# Patient Record
Sex: MALE | Race: WHITE | NOT HISPANIC OR LATINO | Employment: FULL TIME | URBAN - NONMETROPOLITAN AREA
[De-identification: names, ages, dates, MRNs, and addresses within clinical notes are randomized per-mention and may not be internally consistent; named-entity substitution may affect disease eponyms.]

---

## 2019-03-25 ENCOUNTER — APPOINTMENT (OUTPATIENT)
Dept: GENERAL RADIOLOGY | Facility: HOSPITAL | Age: 49
End: 2019-03-25

## 2019-03-25 ENCOUNTER — HOSPITAL ENCOUNTER (EMERGENCY)
Facility: HOSPITAL | Age: 49
Discharge: HOME | End: 2019-03-25
Admitting: NURSE PRACTITIONER

## 2019-03-25 VITALS
HEART RATE: 106 BPM | DIASTOLIC BLOOD PRESSURE: 88 MMHG | RESPIRATION RATE: 18 BRPM | WEIGHT: 310 LBS | TEMPERATURE: 98.3 F | SYSTOLIC BLOOD PRESSURE: 140 MMHG | BODY MASS INDEX: 43.4 KG/M2 | HEIGHT: 71 IN | OXYGEN SATURATION: 94 %

## 2019-03-25 DIAGNOSIS — S93.401A SPRAIN OF RIGHT ANKLE, UNSPECIFIED LIGAMENT, INITIAL ENCOUNTER: Primary | ICD-10-CM

## 2019-03-25 PROCEDURE — 73630 X-RAY EXAM OF FOOT: CPT

## 2019-03-25 PROCEDURE — 99283 EMERGENCY DEPT VISIT LOW MDM: CPT

## 2019-03-25 PROCEDURE — 73610 X-RAY EXAM OF ANKLE: CPT

## 2020-11-30 ENCOUNTER — OFFICE VISIT (OUTPATIENT)
Dept: CARDIOLOGY | Facility: CLINIC | Age: 50
End: 2020-11-30

## 2020-11-30 VITALS
OXYGEN SATURATION: 97 % | SYSTOLIC BLOOD PRESSURE: 172 MMHG | HEART RATE: 91 BPM | BODY MASS INDEX: 44.1 KG/M2 | HEIGHT: 71 IN | DIASTOLIC BLOOD PRESSURE: 94 MMHG | WEIGHT: 315 LBS

## 2020-11-30 DIAGNOSIS — E66.01 MORBID OBESITY WITH BMI OF 50.0-59.9, ADULT (HCC): ICD-10-CM

## 2020-11-30 DIAGNOSIS — R00.2 PALPITATIONS: ICD-10-CM

## 2020-11-30 DIAGNOSIS — E11.9 TYPE 2 DIABETES MELLITUS WITHOUT COMPLICATION, WITHOUT LONG-TERM CURRENT USE OF INSULIN (HCC): ICD-10-CM

## 2020-11-30 DIAGNOSIS — I20.0 UNSTABLE ANGINA (HCC): ICD-10-CM

## 2020-11-30 DIAGNOSIS — R01.1 HEART MURMUR: ICD-10-CM

## 2020-11-30 DIAGNOSIS — I10 ESSENTIAL HYPERTENSION: Primary | ICD-10-CM

## 2020-11-30 DIAGNOSIS — E78.49 OTHER HYPERLIPIDEMIA: ICD-10-CM

## 2020-11-30 PROCEDURE — 93000 ELECTROCARDIOGRAM COMPLETE: CPT | Performed by: EMERGENCY MEDICINE

## 2020-11-30 PROCEDURE — 99204 OFFICE O/P NEW MOD 45 MIN: CPT | Performed by: EMERGENCY MEDICINE

## 2020-11-30 RX ORDER — HYDROCHLOROTHIAZIDE 12.5 MG/1
12.5 CAPSULE, GELATIN COATED ORAL DAILY
COMMUNITY
End: 2020-11-30 | Stop reason: DRUGHIGH

## 2020-11-30 RX ORDER — METOPROLOL SUCCINATE 25 MG/1
25 TABLET, EXTENDED RELEASE ORAL DAILY
COMMUNITY
End: 2022-10-17

## 2020-11-30 RX ORDER — LOSARTAN POTASSIUM 100 MG/1
100 TABLET ORAL DAILY
COMMUNITY
End: 2023-02-07 | Stop reason: SDUPTHER

## 2020-11-30 RX ORDER — AMLODIPINE BESYLATE 10 MG/1
10 TABLET ORAL DAILY
COMMUNITY
End: 2023-02-07 | Stop reason: SDUPTHER

## 2020-11-30 RX ORDER — HYDROCHLOROTHIAZIDE 25 MG/1
25 TABLET ORAL DAILY
Qty: 30 TABLET | Refills: 11 | Status: SHIPPED | OUTPATIENT
Start: 2020-11-30 | End: 2022-05-09 | Stop reason: SDUPTHER

## 2020-11-30 RX ORDER — ATORVASTATIN CALCIUM 40 MG/1
40 TABLET, FILM COATED ORAL DAILY
COMMUNITY
End: 2023-02-07 | Stop reason: SDUPTHER

## 2020-11-30 RX ORDER — OMEPRAZOLE 20 MG/1
20 CAPSULE, DELAYED RELEASE ORAL DAILY
COMMUNITY

## 2020-11-30 RX ORDER — ASPIRIN 81 MG/1
81 TABLET, CHEWABLE ORAL DAILY
Status: ON HOLD | COMMUNITY
End: 2021-05-24 | Stop reason: SDUPTHER

## 2020-11-30 NOTE — PROGRESS NOTES
"    P - CARDIOLOGY  New Patient Initial Outpatient [facility]    Primary Care Physician: Mirella Ortiz APRN    Subjective     Chief Complaint   Patient presents with   • Hypertension     NEW PT   • Shortness of Breath   • Chest Pain   • Dizziness   • Palpitations   • Edema     LEGS        History of Present Illness   \"My blood pressure is too high\"    Patient is a 50-year-old white male with a past medical history that includes poorly controlled hypertension, morbid obesity with a BMI of greater than 50, high cholesterol, and questionable new onset of diabetes who presents today to establish care.  Patient states that he has changed positions recently and was started on a bunch of new medications and he is not sure why.  Currently, he is taking Norvasc 10 mg, aspirin 81 mg, Lipitor 40 mg, hydrochlorothiazide 12.5 mg, Cozaar 100 mg, Metformin 500 mg and Toprol 25 mg.  He states that the Toprol is brand-new and the other medications were started approximately 6 months ago.  He is unsure if he has diabetes or not.  He states that he has trouble sleeping at night and always wakes up fatigued.  He also admits to having at least 3 episodes in which he develops significant chest tightness across his chest.  He gets blurred vision, fluttering in his chest and associated left arm heaviness and numbness.  He is a  and this really concerns him.  He has been checking his blood sugars at home and has been getting in the range of 100 205.  He has been checking his blood pressures at home and getting in the 170/100 range.    Review of Systems   Constitution: Positive for diaphoresis, malaise/fatigue and weight gain. Negative for fever.   HENT: Negative for congestion.    Eyes: Positive for blurred vision. Negative for vision loss in left eye and vision loss in right eye.   Cardiovascular: Positive for chest pain, irregular heartbeat and palpitations. Negative for claudication, dyspnea on exertion, leg " swelling, orthopnea and syncope.   Respiratory: Positive for sleep disturbances due to breathing. Negative for cough, shortness of breath and wheezing.    Hematologic/Lymphatic: Negative for adenopathy.   Skin: Negative for rash.   Musculoskeletal: Negative for joint pain and joint swelling.   Gastrointestinal: Negative for abdominal pain, diarrhea, nausea and vomiting.   Neurological: Positive for excessive daytime sleepiness, dizziness and paresthesias. Negative for focal weakness, light-headedness, numbness and weakness.   Psychiatric/Behavioral: Negative for depression. The patient does not have insomnia.         Otherwise complete ROS reviewed and negative except as mentioned in the HPI.      Past Medical History:   Past Medical History:   Diagnosis Date   • Diabetes mellitus (CMS/HCC)    • Hypertension        Past Surgical History:  Past Surgical History:   Procedure Laterality Date   • KNEE SURGERY      X 4 LEFT    • SHOULDER SURGERY      RIGHT       Family History: family history includes Heart disease in his mother; Heart failure in his mother.    Social History:  reports that he has quit smoking. His smoking use included cigarettes. He quit after 10.00 years of use. His smokeless tobacco use includes chew. He reports current alcohol use. He reports that he does not use drugs.    Medications:  Prior to Admission medications    Medication Sig Start Date End Date Taking? Authorizing Provider   amLODIPine (NORVASC) 10 MG tablet Take 10 mg by mouth Daily.   Yes Yessenia Martinez MD   aspirin 81 MG chewable tablet Chew 81 mg Daily.   Yes ProviderYessenia MD   atorvastatin (LIPITOR) 40 MG tablet Take 40 mg by mouth Daily.   Yes Yessenia Martinez MD   losartan (COZAAR) 100 MG tablet Take 100 mg by mouth Daily.   Yes Yessenia Martinez MD   metFORMIN (GLUCOPHAGE) 500 MG tablet Take 500 mg by mouth Daily With Breakfast.   Yes Yessenia Martinez MD   metoprolol succinate XL (TOPROL-XL) 25 MG 24 hr  "tablet Take 25 mg by mouth Daily.   Yes Provider, MD Yessenia   omeprazole (priLOSEC) 20 MG capsule Take 20 mg by mouth Daily.   Yes Provider, MD Yessenia   hydroCHLOROthiazide (MICROZIDE) 12.5 MG capsule Take 12.5 mg by mouth Daily.  11/30/20 Yes ProviderYessenia MD   diclofenac (VOLTAREN) 50 MG EC tablet Take 1 tablet by mouth 2 (Two) Times a Day As Needed (pain). 3/25/19   Mirella Cerrato APRN   hydroCHLOROthiazide (HYDRODIURIL) 25 MG tablet Take 1 tablet by mouth Daily. 11/30/20   Perez Steele DO     Allergies:  No Known Allergies    Objective     Vital Signs: /94   Pulse 91   Ht 180.3 cm (71\")   Wt (!) 162 kg (358 lb)   SpO2 97%   BMI 49.93 kg/m²     Vitals signs and nursing note reviewed.   Constitutional:       Appearance: Normal and healthy appearance. Well-developed and not in distress.   Eyes:      Extraocular Movements: Extraocular movements intact.      Pupils: Pupils are equal, round, and reactive to light.   HENT:      Head: Normocephalic and atraumatic.    Mouth/Throat:      Pharynx: Oropharynx is clear.   Neck:      Musculoskeletal: Normal range of motion and neck supple.      Vascular: JVD normal.      Trachea: Trachea normal.   Pulmonary:      Effort: Pulmonary effort is normal.      Breath sounds: Normal breath sounds. No wheezing. No rhonchi. No rales.   Cardiovascular:      PMI at left midclavicular line. Normal rate. Regular rhythm. Normal S1. Normal S2.      Murmurs: There is a grade 2/6 systolic murmur.      No gallop. No click. No rub.   Pulses:     Dorsalis pedis: 2+ bilaterally.     Posterior tibial: 2+ bilaterally.  Abdominal:      General: Bowel sounds are normal.      Palpations: Abdomen is soft.      Tenderness: There is no abdominal tenderness.   Musculoskeletal: Normal range of motion.   Skin:     General: Skin is warm and dry.      Capillary Refill: Capillary refill takes less than 2 seconds.   Feet:      Right foot:      Skin integrity: " Skin integrity normal.      Left foot:      Skin integrity: Skin integrity normal.   Neurological:      Mental Status: Alert and oriented to person, place and time.      Cranial Nerves: Cranial nerves are intact.      Sensory: Sensation is intact.      Motor: Motor function is intact.      Coordination: Coordination is intact.   Psychiatric:         Speech: Speech normal.         Behavior: Behavior is cooperative.         Results Reviewed:      ECG 12 Lead    Date/Time: 11/30/2020 9:11 AM  Performed by: Perez Steele DO  Authorized by: Perez Steele DO   Comparison: not compared with previous ECG   Previous ECG: no previous ECG available  Rhythm: sinus rhythm  Rate: normal  Conduction: conduction normal  ST Segments: ST segments normal  T Waves: T waves normal  QRS axis: normal  Other: no other findings    Clinical impression: normal ECG              No results found for: CHOL, TRIG, HDL, VLDL, LDLHDL  No results found for: HGBA1C    Assessment / Plan        Problem List Items Addressed This Visit     None      Visit Diagnoses     Essential hypertension    -  Primary    Relevant Medications    metoprolol succinate XL (TOPROL-XL) 25 MG 24 hr tablet    losartan (COZAAR) 100 MG tablet    amLODIPine (NORVASC) 10 MG tablet    hydroCHLOROthiazide (HYDRODIURIL) 25 MG tablet    Other Relevant Orders    CBC & Differential    Comprehensive Metabolic Panel    TSH    T4, Free    Ambulatory Referral to Sleep Medicine    Adult Transthoracic Echo Complete W/ Cont if Necessary Per Protocol    Morbid obesity with BMI of 50.0-59.9, adult (CMS/[facility])        Relevant Orders    Ambulatory Referral to Sleep Medicine    Type 2 diabetes mellitus without complication, without long-term current use of insulin (CMS/[facility])        Relevant Medications    metFORMIN (GLUCOPHAGE) 500 MG tablet    Other Relevant Orders    Hemoglobin A1c    Other hyperlipidemia        Relevant Medications    atorvastatin (LIPITOR) 40 MG tablet     Other Relevant Orders    Lipid Panel    Palpitations        Relevant Orders    Holter Monitor - 72 Hour Up To 21 Days    Unstable angina (CMS/HCC)        Relevant Medications    metoprolol succinate XL (TOPROL-XL) 25 MG 24 hr tablet    amLODIPine (NORVASC) 10 MG tablet    Other Relevant Orders    Stress Test With Myocardial Perfusion (1 Day)    Adult Transthoracic Echo Complete W/ Cont if Necessary Per Protocol    Heart murmur        Relevant Orders    Adult Transthoracic Echo Complete W/ Cont if Necessary Per Protocol          Plan:  1.  Blood pressure today is 170/90 and he is currently on 4 medications.  I suspect there is a secondary etiology.  We will start work-up with a sleep study and get some lab work including CBC, CMP and TSH.  May need to pursue other etiologies in the future including renal artery disease as well as adrenal disease.  2.  Max outpatient is hydrochlorothiazide to 25 mg daily from 12.5 mg daily  3.  Check an A1c to confirm if patient does not fact have diabetes type 2  4.  Obtain a Lexiscan/Myoview to further risk stratify patient's chest pain  5.  Obtain echocardiogram to assess LV function  6.  Obtain a ZIO to see if we can capture any abnormal rhythms  7.  Obtain baseline lipid panel, continue on current dose of the Lipitor 40  8.  Continue on aspirin 81 mg  9.  Follow-up in 2 weeks to discuss all of these studies        Perez Steele DO   11/30/20   09:05 CST

## 2020-12-26 ENCOUNTER — LAB (OUTPATIENT)
Dept: LAB | Facility: HOSPITAL | Age: 50
End: 2020-12-26

## 2020-12-26 DIAGNOSIS — I10 ESSENTIAL HYPERTENSION: ICD-10-CM

## 2020-12-26 DIAGNOSIS — E11.9 TYPE 2 DIABETES MELLITUS WITHOUT COMPLICATION, WITHOUT LONG-TERM CURRENT USE OF INSULIN (HCC): ICD-10-CM

## 2020-12-26 DIAGNOSIS — E78.49 OTHER HYPERLIPIDEMIA: ICD-10-CM

## 2020-12-26 LAB
ALBUMIN SERPL-MCNC: 4.4 G/DL (ref 3.5–5.2)
ALBUMIN/GLOB SERPL: 1 G/DL
ALP SERPL-CCNC: 116 U/L (ref 39–117)
ALT SERPL W P-5'-P-CCNC: 31 U/L (ref 1–41)
ANION GAP SERPL CALCULATED.3IONS-SCNC: 8 MMOL/L (ref 5–15)
AST SERPL-CCNC: 21 U/L (ref 1–40)
BASOPHILS # BLD AUTO: 0.06 10*3/MM3 (ref 0–0.2)
BASOPHILS NFR BLD AUTO: 0.7 % (ref 0–1.5)
BILIRUB SERPL-MCNC: 0.7 MG/DL (ref 0–1.2)
BUN SERPL-MCNC: 13 MG/DL (ref 6–20)
BUN/CREAT SERPL: 16.7 (ref 7–25)
CALCIUM SPEC-SCNC: 9.8 MG/DL (ref 8.6–10.5)
CHLORIDE SERPL-SCNC: 100 MMOL/L (ref 98–107)
CHOLEST SERPL-MCNC: 163 MG/DL (ref 0–200)
CO2 SERPL-SCNC: 30 MMOL/L (ref 22–29)
CREAT SERPL-MCNC: 0.78 MG/DL (ref 0.76–1.27)
DEPRECATED RDW RBC AUTO: 45.6 FL (ref 37–54)
EOSINOPHIL # BLD AUTO: 0.23 10*3/MM3 (ref 0–0.4)
EOSINOPHIL NFR BLD AUTO: 2.7 % (ref 0.3–6.2)
ERYTHROCYTE [DISTWIDTH] IN BLOOD BY AUTOMATED COUNT: 13.4 % (ref 12.3–15.4)
GFR SERPL CREATININE-BSD FRML MDRD: 105 ML/MIN/1.73
GLOBULIN UR ELPH-MCNC: 4.2 GM/DL
GLUCOSE SERPL-MCNC: 116 MG/DL (ref 65–99)
HBA1C MFR BLD: 6.3 % (ref 4.8–5.6)
HCT VFR BLD AUTO: 47.6 % (ref 37.5–51)
HDLC SERPL-MCNC: 37 MG/DL (ref 40–60)
HGB BLD-MCNC: 15.2 G/DL (ref 13–17.7)
IMM GRANULOCYTES # BLD AUTO: 0.05 10*3/MM3 (ref 0–0.05)
IMM GRANULOCYTES NFR BLD AUTO: 0.6 % (ref 0–0.5)
LDLC SERPL CALC-MCNC: 106 MG/DL (ref 0–100)
LDLC/HDLC SERPL: 2.81 {RATIO}
LYMPHOCYTES # BLD AUTO: 2.05 10*3/MM3 (ref 0.7–3.1)
LYMPHOCYTES NFR BLD AUTO: 23.8 % (ref 19.6–45.3)
MCH RBC QN AUTO: 29.7 PG (ref 26.6–33)
MCHC RBC AUTO-ENTMCNC: 31.9 G/DL (ref 31.5–35.7)
MCV RBC AUTO: 93.2 FL (ref 79–97)
MONOCYTES # BLD AUTO: 0.7 10*3/MM3 (ref 0.1–0.9)
MONOCYTES NFR BLD AUTO: 8.1 % (ref 5–12)
NEUTROPHILS NFR BLD AUTO: 5.52 10*3/MM3 (ref 1.7–7)
NEUTROPHILS NFR BLD AUTO: 64.1 % (ref 42.7–76)
NRBC BLD AUTO-RTO: 0 /100 WBC (ref 0–0.2)
PLATELET # BLD AUTO: 217 10*3/MM3 (ref 140–450)
PMV BLD AUTO: 10.4 FL (ref 6–12)
POTASSIUM SERPL-SCNC: 4.4 MMOL/L (ref 3.5–5.2)
PROT SERPL-MCNC: 8.6 G/DL (ref 6–8.5)
RBC # BLD AUTO: 5.11 10*6/MM3 (ref 4.14–5.8)
SODIUM SERPL-SCNC: 138 MMOL/L (ref 136–145)
T4 FREE SERPL-MCNC: 1.01 NG/DL (ref 0.93–1.7)
TRIGL SERPL-MCNC: 111 MG/DL (ref 0–150)
TSH SERPL DL<=0.05 MIU/L-ACNC: 1.24 UIU/ML (ref 0.27–4.2)
VLDLC SERPL-MCNC: 20 MG/DL (ref 5–40)
WBC # BLD AUTO: 8.61 10*3/MM3 (ref 3.4–10.8)

## 2020-12-26 PROCEDURE — 80061 LIPID PANEL: CPT

## 2020-12-26 PROCEDURE — 85025 COMPLETE CBC W/AUTO DIFF WBC: CPT

## 2020-12-26 PROCEDURE — 80053 COMPREHEN METABOLIC PANEL: CPT

## 2020-12-26 PROCEDURE — 36415 COLL VENOUS BLD VENIPUNCTURE: CPT

## 2020-12-26 PROCEDURE — 83036 HEMOGLOBIN GLYCOSYLATED A1C: CPT

## 2020-12-26 PROCEDURE — 84443 ASSAY THYROID STIM HORMONE: CPT

## 2020-12-26 PROCEDURE — 84439 ASSAY OF FREE THYROXINE: CPT

## 2020-12-28 ENCOUNTER — HOSPITAL ENCOUNTER (OUTPATIENT)
Dept: CARDIOLOGY | Facility: HOSPITAL | Age: 50
Discharge: HOME | End: 2020-12-28

## 2020-12-28 ENCOUNTER — HOSPITAL ENCOUNTER (OUTPATIENT)
Dept: CARDIOLOGY | Facility: HOSPITAL | Age: 50
End: 2020-12-28

## 2020-12-28 DIAGNOSIS — I20.0 UNSTABLE ANGINA (HCC): ICD-10-CM

## 2021-02-09 ENCOUNTER — HOSPITAL ENCOUNTER (OUTPATIENT)
Dept: GENERAL RADIOLOGY | Facility: HOSPITAL | Age: 51
Discharge: HOME | End: 2021-02-09

## 2021-02-09 ENCOUNTER — TRANSCRIBE ORDERS (OUTPATIENT)
Dept: ADMINISTRATIVE | Facility: HOSPITAL | Age: 51
End: 2021-02-09
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
## 2021-02-09 PROCEDURE — 73030 X-RAY EXAM OF SHOULDER: CPT

## 2021-04-13 ENCOUNTER — TRANSCRIBE ORDERS (OUTPATIENT)
Dept: ADMINISTRATIVE | Facility: HOSPITAL | Age: 51
End: 2021-04-13

## 2021-04-13 DIAGNOSIS — S46.912A STRAIN OF ELBOW, LEFT, INITIAL ENCOUNTER: Primary | ICD-10-CM

## 2021-04-23 ENCOUNTER — HOSPITAL ENCOUNTER (OUTPATIENT)
Dept: MRI IMAGING | Facility: HOSPITAL | Age: 51
Discharge: HOME | End: 2021-04-23

## 2021-04-23 ENCOUNTER — HOSPITAL ENCOUNTER (OUTPATIENT)
Dept: GENERAL RADIOLOGY | Facility: HOSPITAL | Age: 51
Discharge: HOME | End: 2021-04-23

## 2021-04-23 VITALS — OXYGEN SATURATION: 96 % | DIASTOLIC BLOOD PRESSURE: 102 MMHG | SYSTOLIC BLOOD PRESSURE: 197 MMHG | HEART RATE: 80 BPM

## 2021-04-23 DIAGNOSIS — S46.912A STRAIN OF ELBOW, LEFT, INITIAL ENCOUNTER: ICD-10-CM

## 2021-04-23 PROCEDURE — 73222 MRI JOINT UPR EXTREM W/DYE: CPT

## 2021-04-23 PROCEDURE — 0 GADOBENATE DIMEGLUMINE 529 MG/ML SOLUTION: Performed by: NURSE PRACTITIONER

## 2021-04-23 PROCEDURE — 0 IOPAMIDOL 41 % SOLUTION: Performed by: NURSE PRACTITIONER

## 2021-04-23 PROCEDURE — 77002 NEEDLE LOCALIZATION BY XRAY: CPT

## 2021-04-23 PROCEDURE — A9577 INJ MULTIHANCE: HCPCS | Performed by: NURSE PRACTITIONER

## 2021-04-23 RX ADMIN — GADOBENATE DIMEGLUMINE 5 ML: 529 INJECTION, SOLUTION INTRAVENOUS at 12:14

## 2021-04-23 RX ADMIN — IOPAMIDOL 10 ML: 408 INJECTION, SOLUTION INTRATHECAL at 12:14

## 2021-05-19 ENCOUNTER — TRANSCRIBE ORDERS (OUTPATIENT)
Dept: ADMINISTRATIVE | Facility: HOSPITAL | Age: 51
End: 2021-05-19

## 2021-05-19 DIAGNOSIS — Z11.59 SCREENING FOR VIRAL DISEASE: Primary | ICD-10-CM

## 2021-05-20 ENCOUNTER — PRE-ADMISSION TESTING (OUTPATIENT)
Dept: PREADMISSION TESTING | Facility: HOSPITAL | Age: 51
End: 2021-05-20

## 2021-05-20 VITALS
HEIGHT: 72 IN | SYSTOLIC BLOOD PRESSURE: 158 MMHG | WEIGHT: 315 LBS | HEART RATE: 86 BPM | DIASTOLIC BLOOD PRESSURE: 86 MMHG | OXYGEN SATURATION: 95 % | BODY MASS INDEX: 42.66 KG/M2

## 2021-05-20 LAB
ANION GAP SERPL CALCULATED.3IONS-SCNC: 10 MMOL/L (ref 5–15)
BUN SERPL-MCNC: 21 MG/DL (ref 6–20)
BUN/CREAT SERPL: 26.9 (ref 7–25)
CALCIUM SPEC-SCNC: 9.8 MG/DL (ref 8.6–10.5)
CHLORIDE SERPL-SCNC: 97 MMOL/L (ref 98–107)
CO2 SERPL-SCNC: 29 MMOL/L (ref 22–29)
CREAT SERPL-MCNC: 0.78 MG/DL (ref 0.76–1.27)
DEPRECATED RDW RBC AUTO: 44.6 FL (ref 37–54)
ERYTHROCYTE [DISTWIDTH] IN BLOOD BY AUTOMATED COUNT: 13.3 % (ref 12.3–15.4)
GFR SERPL CREATININE-BSD FRML MDRD: 105 ML/MIN/1.73
GLUCOSE SERPL-MCNC: 136 MG/DL (ref 65–99)
HCT VFR BLD AUTO: 44.5 % (ref 37.5–51)
HGB BLD-MCNC: 14.9 G/DL (ref 13–17.7)
MCH RBC QN AUTO: 30.5 PG (ref 26.6–33)
MCHC RBC AUTO-ENTMCNC: 33.5 G/DL (ref 31.5–35.7)
MCV RBC AUTO: 91 FL (ref 79–97)
PLATELET # BLD AUTO: 211 10*3/MM3 (ref 140–450)
PMV BLD AUTO: 11.6 FL (ref 6–12)
POTASSIUM SERPL-SCNC: 4.1 MMOL/L (ref 3.5–5.2)
RBC # BLD AUTO: 4.89 10*6/MM3 (ref 4.14–5.8)
SODIUM SERPL-SCNC: 136 MMOL/L (ref 136–145)
WBC # BLD AUTO: 9.77 10*3/MM3 (ref 3.4–10.8)

## 2021-05-20 PROCEDURE — 93010 ELECTROCARDIOGRAM REPORT: CPT | Performed by: INTERNAL MEDICINE

## 2021-05-20 PROCEDURE — 36415 COLL VENOUS BLD VENIPUNCTURE: CPT

## 2021-05-20 PROCEDURE — 80048 BASIC METABOLIC PNL TOTAL CA: CPT

## 2021-05-20 PROCEDURE — 93005 ELECTROCARDIOGRAM TRACING: CPT

## 2021-05-20 PROCEDURE — 85027 COMPLETE CBC AUTOMATED: CPT

## 2021-05-21 ENCOUNTER — LAB (OUTPATIENT)
Dept: LAB | Facility: HOSPITAL | Age: 51
End: 2021-05-21

## 2021-05-21 LAB
QT INTERVAL: 386 MS
QTC INTERVAL: 453 MS
SARS-COV-2 ORF1AB RESP QL NAA+PROBE: NOT DETECTED

## 2021-05-21 PROCEDURE — C9803 HOPD COVID-19 SPEC COLLECT: HCPCS | Performed by: ORTHOPAEDIC SURGERY

## 2021-05-21 PROCEDURE — U0004 COV-19 TEST NON-CDC HGH THRU: HCPCS | Performed by: ORTHOPAEDIC SURGERY

## 2021-05-24 ENCOUNTER — ANESTHESIA (OUTPATIENT)
Dept: PERIOP | Facility: HOSPITAL | Age: 51
End: 2021-05-24

## 2021-05-24 ENCOUNTER — HOSPITAL ENCOUNTER (OUTPATIENT)
Facility: HOSPITAL | Age: 51
Setting detail: HOSPITAL OUTPATIENT SURGERY
Discharge: HOME | End: 2021-05-24
Attending: ORTHOPAEDIC SURGERY | Admitting: ORTHOPAEDIC SURGERY

## 2021-05-24 ENCOUNTER — ANESTHESIA EVENT (OUTPATIENT)
Dept: PERIOP | Facility: HOSPITAL | Age: 51
End: 2021-05-24

## 2021-05-24 VITALS
DIASTOLIC BLOOD PRESSURE: 88 MMHG | TEMPERATURE: 97.3 F | HEART RATE: 70 BPM | OXYGEN SATURATION: 92 % | RESPIRATION RATE: 18 BRPM | SYSTOLIC BLOOD PRESSURE: 172 MMHG

## 2021-05-24 DIAGNOSIS — S46.012A TRAUMATIC COMPLETE TEAR OF LEFT ROTATOR CUFF, INITIAL ENCOUNTER: Primary | ICD-10-CM

## 2021-05-24 LAB
GLUCOSE BLDC GLUCOMTR-MCNC: 103 MG/DL (ref 70–130)
GLUCOSE BLDC GLUCOMTR-MCNC: 132 MG/DL (ref 70–130)

## 2021-05-24 PROCEDURE — 76942 ECHO GUIDE FOR BIOPSY: CPT | Performed by: ORTHOPAEDIC SURGERY

## 2021-05-24 PROCEDURE — 25010000002 FENTANYL CITRATE (PF) 250 MCG/5ML SOLUTION: Performed by: NURSE ANESTHETIST, CERTIFIED REGISTERED

## 2021-05-24 PROCEDURE — 82962 GLUCOSE BLOOD TEST: CPT

## 2021-05-24 PROCEDURE — C1889 IMPLANT/INSERT DEVICE, NOC: HCPCS | Performed by: ORTHOPAEDIC SURGERY

## 2021-05-24 PROCEDURE — 25010000002 ROPIVACAINE PER 1 MG: Performed by: ANESTHESIOLOGY

## 2021-05-24 PROCEDURE — 25010000002 EPINEPHRINE PER 0.1 MG: Performed by: ORTHOPAEDIC SURGERY

## 2021-05-24 PROCEDURE — 25010000002 ONDANSETRON PER 1 MG: Performed by: NURSE ANESTHETIST, CERTIFIED REGISTERED

## 2021-05-24 PROCEDURE — C1713 ANCHOR/SCREW BN/BN,TIS/BN: HCPCS | Performed by: ORTHOPAEDIC SURGERY

## 2021-05-24 PROCEDURE — 25010000002 CEFAZOLIN PER 500 MG: Performed by: ORTHOPAEDIC SURGERY

## 2021-05-24 PROCEDURE — 25010000002 MIDAZOLAM PER 1 MG: Performed by: ANESTHESIOLOGY

## 2021-05-24 PROCEDURE — 25010000002 PHENYLEPHRINE HCL 0.8 MG/10ML SOLUTION PREFILLED SYRINGE: Performed by: NURSE ANESTHETIST, CERTIFIED REGISTERED

## 2021-05-24 PROCEDURE — 25010000002 FENTANYL CITRATE (PF) 50 MCG/ML SOLUTION: Performed by: ANESTHESIOLOGY

## 2021-05-24 PROCEDURE — 25010000002 PROPOFOL 10 MG/ML EMULSION: Performed by: NURSE ANESTHETIST, CERTIFIED REGISTERED

## 2021-05-24 DEVICE — SUT/ANCH BIOCOMP SWIVELOCK KNOTLSS W/TIGR/TPE/LP 4.75X19.1MM: Type: IMPLANTABLE DEVICE | Site: SHOULDER | Status: FUNCTIONAL

## 2021-05-24 DEVICE — SUT FIBERLOOP NO2 W/CRV NDL 1/2 CIR 20IN BLU: Type: IMPLANTABLE DEVICE | Site: SHOULDER | Status: FUNCTIONAL

## 2021-05-24 DEVICE — SUT TIGERLOOP 2 STR 20IN TW 7MMLP AR7234T: Type: IMPLANTABLE DEVICE | Site: SHOULDER | Status: FUNCTIONAL

## 2021-05-24 DEVICE — SUT TPE FIBERLINK W/CLSD/LP 1.7MM: Type: IMPLANTABLE DEVICE | Site: SHOULDER | Status: FUNCTIONAL

## 2021-05-24 DEVICE — SYS SUT/ANCH BIOCOMP SPEEDBRIDGE KNOTLSS SCORPION/NDL 4.75MM: Type: IMPLANTABLE DEVICE | Site: SHOULDER | Status: FUNCTIONAL

## 2021-05-24 RX ORDER — FENTANYL CITRATE 50 UG/ML
INJECTION, SOLUTION INTRAMUSCULAR; INTRAVENOUS AS NEEDED
Status: DISCONTINUED | OUTPATIENT
Start: 2021-05-24 | End: 2021-05-24 | Stop reason: SURG

## 2021-05-24 RX ORDER — FLUMAZENIL 0.1 MG/ML
0.2 INJECTION INTRAVENOUS AS NEEDED
Status: DISCONTINUED | OUTPATIENT
Start: 2021-05-24 | End: 2021-05-24 | Stop reason: HOSPADM

## 2021-05-24 RX ORDER — FENTANYL CITRATE 50 UG/ML
50 INJECTION, SOLUTION INTRAMUSCULAR; INTRAVENOUS ONCE
Status: COMPLETED | OUTPATIENT
Start: 2021-05-24 | End: 2021-05-24

## 2021-05-24 RX ORDER — PROPOFOL 10 MG/ML
VIAL (ML) INTRAVENOUS AS NEEDED
Status: DISCONTINUED | OUTPATIENT
Start: 2021-05-24 | End: 2021-05-24 | Stop reason: SURG

## 2021-05-24 RX ORDER — ONDANSETRON 2 MG/ML
4 INJECTION INTRAMUSCULAR; INTRAVENOUS ONCE AS NEEDED
Status: DISCONTINUED | OUTPATIENT
Start: 2021-05-24 | End: 2021-05-24 | Stop reason: HOSPADM

## 2021-05-24 RX ORDER — OXYCODONE AND ACETAMINOPHEN 7.5; 325 MG/1; MG/1
1-2 TABLET ORAL EVERY 4 HOURS PRN
Qty: 60 TABLET | Refills: 0 | Status: SHIPPED | OUTPATIENT
Start: 2021-05-24 | End: 2022-05-09

## 2021-05-24 RX ORDER — PHENYLEPHRINE HCL IN 0.9% NACL 0.8MG/10ML
SYRINGE (ML) INTRAVENOUS AS NEEDED
Status: DISCONTINUED | OUTPATIENT
Start: 2021-05-24 | End: 2021-05-24 | Stop reason: SURG

## 2021-05-24 RX ORDER — SODIUM CHLORIDE 0.9 % (FLUSH) 0.9 %
3 SYRINGE (ML) INJECTION AS NEEDED
Status: DISCONTINUED | OUTPATIENT
Start: 2021-05-24 | End: 2021-05-24 | Stop reason: HOSPADM

## 2021-05-24 RX ORDER — ONDANSETRON 4 MG/1
4 TABLET, FILM COATED ORAL EVERY 8 HOURS PRN
Qty: 20 TABLET | Refills: 1 | Status: SHIPPED | OUTPATIENT
Start: 2021-05-24

## 2021-05-24 RX ORDER — SODIUM CHLORIDE 0.9 % (FLUSH) 0.9 %
3-10 SYRINGE (ML) INJECTION AS NEEDED
Status: DISCONTINUED | OUTPATIENT
Start: 2021-05-24 | End: 2021-05-24 | Stop reason: HOSPADM

## 2021-05-24 RX ORDER — OXYCODONE AND ACETAMINOPHEN 7.5; 325 MG/1; MG/1
2 TABLET ORAL EVERY 4 HOURS PRN
Status: DISCONTINUED | OUTPATIENT
Start: 2021-05-24 | End: 2021-05-24 | Stop reason: HOSPADM

## 2021-05-24 RX ORDER — SODIUM CHLORIDE, SODIUM LACTATE, POTASSIUM CHLORIDE, CALCIUM CHLORIDE 600; 310; 30; 20 MG/100ML; MG/100ML; MG/100ML; MG/100ML
100 INJECTION, SOLUTION INTRAVENOUS CONTINUOUS
Status: DISCONTINUED | OUTPATIENT
Start: 2021-05-24 | End: 2021-05-24 | Stop reason: HOSPADM

## 2021-05-24 RX ORDER — LIDOCAINE HYDROCHLORIDE 10 MG/ML
0.5 INJECTION, SOLUTION EPIDURAL; INFILTRATION; INTRACAUDAL; PERINEURAL ONCE AS NEEDED
Status: DISCONTINUED | OUTPATIENT
Start: 2021-05-24 | End: 2021-05-24 | Stop reason: HOSPADM

## 2021-05-24 RX ORDER — SODIUM CHLORIDE, SODIUM LACTATE, POTASSIUM CHLORIDE, CALCIUM CHLORIDE 600; 310; 30; 20 MG/100ML; MG/100ML; MG/100ML; MG/100ML
1000 INJECTION, SOLUTION INTRAVENOUS CONTINUOUS
Status: DISCONTINUED | OUTPATIENT
Start: 2021-05-24 | End: 2021-05-24 | Stop reason: HOSPADM

## 2021-05-24 RX ORDER — IBUPROFEN 600 MG/1
600 TABLET ORAL ONCE AS NEEDED
Status: DISCONTINUED | OUTPATIENT
Start: 2021-05-24 | End: 2021-05-24 | Stop reason: HOSPADM

## 2021-05-24 RX ORDER — SODIUM CHLORIDE 0.9 % (FLUSH) 0.9 %
3 SYRINGE (ML) INJECTION EVERY 12 HOURS SCHEDULED
Status: DISCONTINUED | OUTPATIENT
Start: 2021-05-24 | End: 2021-05-24 | Stop reason: HOSPADM

## 2021-05-24 RX ORDER — BUPIVACAINE HCL/0.9 % NACL/PF 0.1 %
2 PLASTIC BAG, INJECTION (ML) EPIDURAL ONCE
Status: COMPLETED | OUTPATIENT
Start: 2021-05-24 | End: 2021-05-24

## 2021-05-24 RX ORDER — ASPIRIN 81 MG/1
81 TABLET, CHEWABLE ORAL 2 TIMES DAILY
Qty: 42 TABLET | Refills: 0 | Status: SHIPPED | OUTPATIENT
Start: 2021-05-24 | End: 2021-06-14

## 2021-05-24 RX ORDER — LIDOCAINE HYDROCHLORIDE 20 MG/ML
INJECTION, SOLUTION EPIDURAL; INFILTRATION; INTRACAUDAL; PERINEURAL AS NEEDED
Status: DISCONTINUED | OUTPATIENT
Start: 2021-05-24 | End: 2021-05-24 | Stop reason: SURG

## 2021-05-24 RX ORDER — ROPIVACAINE HYDROCHLORIDE 5 MG/ML
INJECTION, SOLUTION EPIDURAL; INFILTRATION; PERINEURAL
Status: COMPLETED | OUTPATIENT
Start: 2021-05-24 | End: 2021-05-24

## 2021-05-24 RX ORDER — ROCURONIUM BROMIDE 10 MG/ML
INJECTION, SOLUTION INTRAVENOUS AS NEEDED
Status: DISCONTINUED | OUTPATIENT
Start: 2021-05-24 | End: 2021-05-24 | Stop reason: SURG

## 2021-05-24 RX ORDER — LIDOCAINE HYDROCHLORIDE 40 MG/ML
SOLUTION TOPICAL AS NEEDED
Status: DISCONTINUED | OUTPATIENT
Start: 2021-05-24 | End: 2021-05-24 | Stop reason: SURG

## 2021-05-24 RX ORDER — MAGNESIUM HYDROXIDE 1200 MG/15ML
LIQUID ORAL AS NEEDED
Status: DISCONTINUED | OUTPATIENT
Start: 2021-05-24 | End: 2021-05-24 | Stop reason: HOSPADM

## 2021-05-24 RX ORDER — LABETALOL HYDROCHLORIDE 5 MG/ML
5 INJECTION, SOLUTION INTRAVENOUS
Status: DISCONTINUED | OUTPATIENT
Start: 2021-05-24 | End: 2021-05-24 | Stop reason: HOSPADM

## 2021-05-24 RX ORDER — MIDAZOLAM HYDROCHLORIDE 1 MG/ML
1 INJECTION INTRAMUSCULAR; INTRAVENOUS
Status: DISCONTINUED | OUTPATIENT
Start: 2021-05-24 | End: 2021-05-24 | Stop reason: HOSPADM

## 2021-05-24 RX ORDER — ACETAMINOPHEN 500 MG
1000 TABLET ORAL ONCE
Status: COMPLETED | OUTPATIENT
Start: 2021-05-24 | End: 2021-05-24

## 2021-05-24 RX ORDER — MIDAZOLAM HYDROCHLORIDE 1 MG/ML
2 INJECTION INTRAMUSCULAR; INTRAVENOUS ONCE
Status: DISCONTINUED | OUTPATIENT
Start: 2021-05-24 | End: 2021-05-24 | Stop reason: HOSPADM

## 2021-05-24 RX ORDER — DOCUSATE SODIUM 100 MG/1
100 CAPSULE, LIQUID FILLED ORAL 2 TIMES DAILY
Qty: 60 CAPSULE | Refills: 1 | Status: SHIPPED | OUTPATIENT
Start: 2021-05-24 | End: 2022-05-09

## 2021-05-24 RX ORDER — ONDANSETRON 2 MG/ML
INJECTION INTRAMUSCULAR; INTRAVENOUS AS NEEDED
Status: DISCONTINUED | OUTPATIENT
Start: 2021-05-24 | End: 2021-05-24 | Stop reason: SURG

## 2021-05-24 RX ORDER — FENTANYL CITRATE 50 UG/ML
25 INJECTION, SOLUTION INTRAMUSCULAR; INTRAVENOUS
Status: DISCONTINUED | OUTPATIENT
Start: 2021-05-24 | End: 2021-05-24 | Stop reason: HOSPADM

## 2021-05-24 RX ORDER — NALOXONE HCL 0.4 MG/ML
0.4 VIAL (ML) INJECTION AS NEEDED
Status: DISCONTINUED | OUTPATIENT
Start: 2021-05-24 | End: 2021-05-24 | Stop reason: HOSPADM

## 2021-05-24 RX ORDER — OXYCODONE AND ACETAMINOPHEN 10; 325 MG/1; MG/1
1 TABLET ORAL ONCE AS NEEDED
Status: DISCONTINUED | OUTPATIENT
Start: 2021-05-24 | End: 2021-05-24 | Stop reason: HOSPADM

## 2021-05-24 RX ORDER — CYCLOBENZAPRINE HCL 10 MG
10 TABLET ORAL 3 TIMES DAILY PRN
Qty: 30 TABLET | Refills: 0 | Status: SHIPPED | OUTPATIENT
Start: 2021-05-24 | End: 2022-05-09

## 2021-05-24 RX ORDER — EPINEPHRINE 1 MG/ML
INJECTION, SOLUTION, CONCENTRATE INTRAVENOUS AS NEEDED
Status: DISCONTINUED | OUTPATIENT
Start: 2021-05-24 | End: 2021-05-24 | Stop reason: HOSPADM

## 2021-05-24 RX ADMIN — VASOPRESSIN 2 ML: 20 INJECTION INTRAVENOUS at 14:09

## 2021-05-24 RX ADMIN — ROCURONIUM BROMIDE 50 MG: 10 INJECTION INTRAVENOUS at 13:44

## 2021-05-24 RX ADMIN — PROPOFOL 150 MG: 10 INJECTION, EMULSION INTRAVENOUS at 13:44

## 2021-05-24 RX ADMIN — SODIUM CHLORIDE, POTASSIUM CHLORIDE, SODIUM LACTATE AND CALCIUM CHLORIDE: 600; 310; 30; 20 INJECTION, SOLUTION INTRAVENOUS at 14:23

## 2021-05-24 RX ADMIN — MIDAZOLAM 1 MG: 1 INJECTION INTRAMUSCULAR; INTRAVENOUS at 12:48

## 2021-05-24 RX ADMIN — Medication 3 G: at 13:59

## 2021-05-24 RX ADMIN — FENTANYL CITRATE 50 MCG: 50 INJECTION, SOLUTION INTRAMUSCULAR; INTRAVENOUS at 12:48

## 2021-05-24 RX ADMIN — Medication 400 MCG: at 14:08

## 2021-05-24 RX ADMIN — ACETAMINOPHEN 1000 MG: 500 TABLET, FILM COATED ORAL at 12:48

## 2021-05-24 RX ADMIN — LIDOCAINE HYDROCHLORIDE 1 EACH: 40 SOLUTION TOPICAL at 13:44

## 2021-05-24 RX ADMIN — SODIUM CHLORIDE, POTASSIUM CHLORIDE, SODIUM LACTATE AND CALCIUM CHLORIDE 1000 ML: 600; 310; 30; 20 INJECTION, SOLUTION INTRAVENOUS at 11:58

## 2021-05-24 RX ADMIN — LIDOCAINE HYDROCHLORIDE 100 MG: 20 INJECTION, SOLUTION EPIDURAL; INFILTRATION; INTRACAUDAL; PERINEURAL at 13:44

## 2021-05-24 RX ADMIN — ONDANSETRON 4 MG: 2 INJECTION INTRAMUSCULAR; INTRAVENOUS at 16:29

## 2021-05-24 RX ADMIN — Medication 400 MCG: at 14:03

## 2021-05-24 RX ADMIN — FENTANYL CITRATE 150 MCG: 50 INJECTION, SOLUTION INTRAMUSCULAR; INTRAVENOUS at 13:44

## 2021-05-24 RX ADMIN — ROPIVACAINE HYDROCHLORIDE 30 ML: 5 INJECTION, SOLUTION EPIDURAL; INFILTRATION; PERINEURAL at 12:51

## 2021-05-24 NOTE — OP NOTE
Patient Name: Jose Maria  : 1970  MRN: [id number]    DATE of SURGERY: 2021    SURGEON: Alex Hamilton MD    ASSISTANT: None    PREOPERATIVE DIAGNOSIS:  1) Left shoulder acute traumatic complete rotator cuff tear  2) Left shoulder primary osteoarthritis acromioclavicular joint  3) Left shoulder subacromial impingement syndrome    POSTOPERATIVE DIAGNOSIS:  4) Left shoulder acute traumatic complete rotator cuff tear  5) Left shoulder primary osteoarthritis acromioclavicular joint  6) Left shoulder subacromial impingement syndrome    PROCEDURE PERFORMED:  1) Left shoulder arthroscopic rotator cuff repair  2) Left shoulder arthroscopic labral debridement  3) Left shoulder arthroscopic distal clavicle excision  4) Left shoulder arthroscopic subacromial decompression    IMPLANTS: Arthrex 4.75 mm bioswivelock anchor    ANESTHESIA USED: General endotrachial anesthesia, interscalene block    ESTIMATED BLOOD LOSS: minimal    DRAINS: none     COMPLICATIONS: none    SPECIMENS: none    OPERATIVE INDICATIONS: This patient is a 51 y.o. male who presented to my clinic with complaints of progressive shoulder pain after a traumatic injury to the shoulder.  An MRI was obtained which showed the above-named diagnoses. Pain was not relieved with conservative treatment consisting of anti-inflammatories, corticosteroid injections, physical therapy.  Due to progressive pain and loss of function, surgical evaluation was discussed and the patient wished to proceed understanding risks, benefits, and alternatives. The surgical indications were to relieve pain, improve function, and prevent future disability in regards to the shoulder pathology dictated in the above diagnoses.  Risks included, but were not limited to, that of anesthesia, bleeding, infection, pain, damage to local structures, need for further surgery, failure of repair, stiffness, failure of implants, and loss of function.      OPERATIVE FINDINGS:  1) Exam under  anesthesia:  Full passive ROM, joint stable without laxity, skin intact  2) Glenohumeral Joint:       A) Chondral surfaces: intact       B) Labrum: Degenerative circumferential tearing       C) Biceps: Torn and retracted       D) Rotator Cuff: Complete full thickness tear supraspinatus, crescent shaped, tendon/bone quality good, adequate excursion  3) Subacromial space:         A) Large amount of dense vascular bursa         B) Type 3 acromium, hypertrophic coracoacromial ligament         C) Bursal surface rotator cuff:  Complete tear as above         D) AC joint:  Hypertrophic with decreased joint space, osteophytes    PROCEDURE in DETAIL:  The patient was seen in the preoperative holding room, once again the informed consent was reviewed with the patient and signed.  The site of surgery was marked with the patient's agreement.  After being transported to the operating room, a timeout was performed identifying the correct patient as well as the operative site.  Dose appropriate IV antibiotics were given prior to incision.  The patient was positioned in the beach chair position, all bony prominences were protected and a sterile prep and drape was performed.  A standard posterior arthroscopy portal was established and an outside-in technique was utilized to establish an anterior portal within the rotator interval.  An arthroscopic probe was inserted and a thorough exam of the glenohumeral joint was performed.    Attention was first turned to the biceps-labral complex noting a chronic torn and retracted biceps tendon with circumferential degenerative tearing of the labrum that was unstable to probing.  The surrounding labrum was debrided with a shaver and the probe was reinserted showing the remaining labrum to be stable.    Attention was then turned to the intraarticular portion of the rotator cuff.  A probe was used to identify a complete tear of the rotator cuff and a shaver was used to debride the tendon.    The  arthroscope was then transitioned to the subacromial space and an outside in technique was used to establish a lateral portal.  The arthroscopic shaver was introduced in the subacromial space and a complete bursectomy was performed with this device.      Attention was turned to the anterior and lateral edge of the acromium where soft tissue was removed with a cautery device.  Due to a prominent acromial spur causing impingement, an acromioplasty was performed with an arthroscopic oma converting this to a flat type 1 morphology.  The coracoaromial ligament was incised with a cautery device completing the subacromial decompression.    Attention was then turned to the rotator cuff tear which was visualized from the intraarticular space.  The tendon and greater tuberosity footprint were debrided followed by shuttling fibertape and fiberwire suture into the tear site with a suture passing device.  Fixation was then performed to the greater tuberosity footprint with a 4.75 mm PEEK swivelock anchor in knotless fashion. The repair appeared to be anatomic.    Attention was then turned to the acromioclavicular joint where a cautery device was utilized to remove soft tissue from the distal end of the clavicle revealing decreased joint space and osteophyte formation.  An arthroscopic oma was introduced through the anterior portal removing the entire articular portion of the distal clavicle.  Less than 1 cm of bone was removed, preserving the posterior and superior ligaments to prevent instability of the joint.  The arthroscope was transitioned anteriorly noting the decompression to be adequate.    Free fluid was suctioned from the shoulder and the portals were then closed with monocryl and a sterile dressing was applied followed by a sling.  The patient was awakened from anesthesia, transported to the recovery room in stable condition.    POSTOP PLAN:    1) Discharge home with family  2) Follow up in 2 weeks for a clinical  check  3) Follow the following protocol: Large/Massive RCR

## 2021-05-24 NOTE — ANESTHESIA PREPROCEDURE EVALUATION
Anesthesia Evaluation     Patient summary reviewed   no history of anesthetic complications:  NPO Solid Status: > 6 hours  NPO Liquid Status: > 6 hours           Airway   Mallampati: III  TM distance: >3 FB  Neck ROM: full  Dental - normal exam     Pulmonary    Cardiovascular   Exercise tolerance: good (4-7 METS)    (+) hypertension, hyperlipidemia,   (-) past MI      Neuro/Psych  (-) seizures, CVA  GI/Hepatic/Renal/Endo    (+) morbid obesity, GERD,  diabetes mellitus,   (-) liver disease, no renal disease    Musculoskeletal     Abdominal   (+) obese,    Substance History      OB/GYN          Other   arthritis,                      Anesthesia Plan    ASA 3     general with block     intravenous induction     Anesthetic plan, all risks, benefits, and alternatives have been provided, discussed and informed consent has been obtained with: patient.

## 2021-05-24 NOTE — ANESTHESIA PROCEDURE NOTES
Peripheral Block    Pre-sedation assessment completed: 5/24/2021 12:49 PM    Patient reassessed immediately prior to procedure    Patient location during procedure: holding area  Start time: 5/24/2021 12:50 PM  Stop time: 5/24/2021 12:51 PM  Reason for block: procedure for pain, at surgeon's request, post-op pain management and Request by Dr. Hamilton  Performed by  Anesthesiologist: Denzel Liu MD  Preanesthetic Checklist  Completed: patient identified, IV checked, site marked, risks and benefits discussed, surgical consent, monitors and equipment checked, pre-op evaluation and timeout performed  Prep:  Pt Position: supine  Sterile barriers:gloves and washed/disinfected hands  Prep: ChloraPrep  Patient monitoring: blood pressure monitoring, continuous pulse oximetry and EKG  Procedure  Sedation:yes  Performed under: local infiltration  Guidance:ultrasound guided and Brachial plexus identified and local anesthetic seen surrounding nerves  Images:still images obtained (picture printed and placed in patients chart)    Block Type:interscalene  Injection Technique:single-shot  Needle Type:echogenic  Needle Gauge:22 G  Resistance on Injection: none    Medications Used: ropivacaine (NAROPIN) injection 0.5 %, 30 mL  Med admintered at 5/24/2021 12:51 PM      Post Assessment  Injection Assessment: negative aspiration for heme, no paresthesia on injection and incremental injection  Patient Tolerance:comfortable throughout block  Complications:no

## 2021-05-24 NOTE — DISCHARGE INSTRUCTIONS
YOUR NEXT PAIN MEDICATION IS DUE AT______________         General Anesthesia, Adult, Care After  This sheet gives you information about how to care for yourself after your procedure. Your health care provider may also give you more specific instructions. If you have problems or questions, contact your health care provider.  What can I expect after the procedure?  After the procedure, the following side effects are common:  · Pain or discomfort at the IV site.  · Nausea.  · Vomiting.  · Sore throat.  · Trouble concentrating.  · Feeling cold or chills.  · Weak or tired.  · Sleepiness and fatigue.  · Soreness and body aches. These side effects can affect parts of the body that were not involved in surgery.  Follow these instructions at home:   For at least 24 hours after the procedure:  1. Have a responsible adult stay with you. It is important to have someone help care for you until you are awake and alert.  2. Rest as needed.  3. Do not:  ? Participate in activities in which you could fall or become injured.  ? Drive.  ? Use heavy machinery.  ? Drink alcohol.  ? Take sleeping pills or medicines that cause drowsiness.  ? Make important decisions or sign legal documents.  ? Take care of children on your own.  Eating and drinking  · Follow any instructions from your health care provider about eating or drinking restrictions.  · When you feel hungry, start by eating small amounts of foods that are soft and easy to digest (bland), such as toast. Gradually return to your regular diet.  · Drink enough fluid to keep your urine pale yellow.  · If you vomit, rehydrate by drinking water, juice, or clear broth.  General instructions  1. If you have sleep apnea, surgery and certain medicines can increase your risk for breathing problems. Follow instructions from your health care provider about wearing your sleep device:  ? Anytime you are sleeping, including during daytime naps.  ? While taking prescription pain medicines,  sleeping medicines, or medicines that make you drowsy.  2. Return to your normal activities as told by your health care provider. Ask your health care provider what activities are safe for you.  3. Take over-the-counter and prescription medicines only as told by your health care provider.  4. If you smoke, do not smoke without supervision.  5. Keep all follow-up visits as told by your health care provider. This is important.  Contact a health care provider if:  · You have nausea or vomiting that does not get better with medicine.  · You cannot eat or drink without vomiting.  · You have pain that does not get better with medicine.  · You are unable to pass urine.  · You develop a skin rash.  · You have a fever.  · You have redness around your IV site that gets worse.  Get help right away if:  · You have difficulty breathing.  · You have chest pain.  · You have blood in your urine or stool, or you vomit blood.  Summary  · After the procedure, it is common to have a sore throat or nausea. It is also common to feel tired.  · Have a responsible adult stay with you for the first 24 hours after general anesthesia. It is important to have someone help care for you until you are awake and alert.  · When you feel hungry, start by eating small amounts of foods that are soft and easy to digest (bland), such as toast. Gradually return to your regular diet.  · Drink enough fluid to keep your urine pale yellow.  · Return to your normal activities as told by your health care provider. Ask your health care provider what activities are safe for you.  This information is not intended to replace advice given to you by your health care provider. Make sure you discuss any questions you have with your health care provider.  Document Revised: 12/21/2018 Document Reviewed: 08/03/2018    CALL YOUR PHYSICIAN IF YOU EXPERIENCE  INCREASED PAIN NOT HELPED BY YOUR PAIN MEDICATION.      .                                              Fall  Prevention in the Home      Falls can cause injuries. They can happen to people of all ages. There are many things you can do to make your home safe and to help prevent falls.    WHAT CAN I DO ON THE OUTSIDE OF MY HOME?  · Regularly fix the edges of walkways and driveways and fix any cracks.  · Remove anything that might make you trip as you walk through a door, such as a raised step or threshold.  · Trim any bushes or trees on the path to your home.  · Use bright outdoor lighting.  · Clear any walking paths of anything that might make someone trip, such as rocks or tools.  · Regularly check to see if handrails are loose or broken. Make sure that both sides of any steps have handrails.  · Any raised decks and porches should have guardrails on the edges.  · Have any leaves, snow, or ice cleared regularly.  · Use sand or salt on walking paths during winter.  · Clean up any spills in your garage right away. This includes oil or grease spills.  WHAT CAN I DO IN THE BATHROOM?    · Use night lights.  · Install grab bars by the toilet and in the tub and shower. Do not use towel bars as grab bars.  · Use non-skid mats or decals in the tub or shower.  · If you need to sit down in the shower, use a plastic, non-slip stool.  · Keep the floor dry. Clean up any water that spills on the floor as soon as it happens.  · Remove soap buildup in the tub or shower regularly.  · Attach bath mats securely with double-sided non-slip rug tape.  · Do not have throw rugs and other things on the floor that can make you trip.  WHAT CAN I DO IN THE BEDROOM?  · Use night lights.  · Make sure that you have a light by your bed that is easy to reach.  · Do not use any sheets or blankets that are too big for your bed. They should not hang down onto the floor.  · Have a firm chair that has side arms. You can use this for support while you get dressed.  · Do not have throw rugs and other things on the floor that can make you trip.  WHAT CAN I DO IN  THE KITCHEN?  · Clean up any spills right away.  · Avoid walking on wet floors.  · Keep items that you use a lot in easy-to-reach places.  · If you need to reach something above you, use a strong step stool that has a grab bar.  · Keep electrical cords out of the way.  · Do not use floor polish or wax that makes floors slippery. If you must use wax, use non-skid floor wax.  · Do not have throw rugs and other things on the floor that can make you trip.  WHAT CAN I DO WITH MY STAIRS?  · Do not leave any items on the stairs.  · Make sure that there are handrails on both sides of the stairs and use them. Fix handrails that are broken or loose. Make sure that handrails are as long as the stairways.  · Check any carpeting to make sure that it is firmly attached to the stairs. Fix any carpet that is loose or worn.  · Avoid having throw rugs at the top or bottom of the stairs. If you do have throw rugs, attach them to the floor with carpet tape.  · Make sure that you have a light switch at the top of the stairs and the bottom of the stairs. If you do not have them, ask someone to add them for you.  WHAT ELSE CAN I DO TO HELP PREVENT FALLS?  · Wear shoes that:  ¨ Do not have high heels.  ¨ Have rubber bottoms.  ¨ Are comfortable and fit you well.  ¨ Are closed at the toe. Do not wear sandals.  · If you use a stepladder:  ¨ Make sure that it is fully opened. Do not climb a closed stepladder.  ¨ Make sure that both sides of the stepladder are locked into place.  ¨ Ask someone to hold it for you, if possible.  · Clearly mickey and make sure that you can see:  ¨ Any grab bars or handrails.  ¨ First and last steps.  ¨ Where the edge of each step is.  · Use tools that help you move around (mobility aids) if they are needed. These include:  ¨ Canes.  ¨ Walkers.  ¨ Scooters.  ¨ Crutches.  · Turn on the lights when you go into a dark area. Replace any light bulbs as soon as they burn out.  · Set up your furniture so you have a clear  path. Avoid moving your furniture around.  · If any of your floors are uneven, fix them.  · If there are any pets around you, be aware of where they are.  · Review your medicines with your doctor. Some medicines can make you feel dizzy. This can increase your chance of falling.  Ask your doctor what other things that you can do to help prevent falls.     This information is not intended to replace advice given to you by your health care provider. Make sure you discuss any questions you have with your health care provider.     Document Released: 10/14/2010 Document Revised: 05/03/2016 Document Reviewed: 01/22/2016  [email or web address] Interactive Patient Education ©2016 [email or web address]     What to expect after a Nerve Block  Nerve blocks administered to block pain affect many types of nerves, including those nerves that control movement, pain, and normal sensation.  Following a nerve block, you may notice some bruising at the site where the block was given.  You may experience sensations such as:  numbness of the affected area or limb, tingling, heaviness (that is the limb feels heavy to you), weakness or inability to move the affected arm or leg, or a feeling as if your arm or leg has “fallen asleep”.    A nerve block can last from 9-18 hours depending on the medications used.  Usually the weakness wears off first followed by the tingling and heaviness.  As the block wears off, you may begin to notice pain; however, this sequence of events may occur in any order.  Typically, you will be able to move your limb before you will feel it.  Once a nerve block begins to wear off, the effects are usually completely gone within 60 minutes.    If you experience continued side effects that you believe are block related for longer than 48 hours, please call your healthcare provider.  Please see block-specific instructions below.    Instructions for any block involving the shoulder or arm  • If you have had any kind of shoulder/arm block,  you will go home with your arm in a sling.  Wear the sling until the block has completely worn off.  You may be required to wear it for a longer period of time per your surgeon’s recommendations.  • I you have had a shoulder/arm block; it is a good idea to sleep on a recliner with pillows under your arm.    Note:  If you have severe or prolonged shortness of breath, please seek medical assistance as soon as possible.    Protection of a “blocked” arm (limb)  • After a nerve block, you cannot feel pain, pressure, or extremes of temperature in the affected limb.  And because of this, your blocked limb is at more risk for injury.  For example, it is possible to burn your limb on an extremely hot surface without feeling it.  • When resting, it is important to reposition your limb periodically to avoid prolonged pressure on it.  This may require the use of pillows and padding.  • While sleeping, you should avoid rolling onto the affected limb or putting too much pressure on it.  • If you have a cast or tight dressing, check the color of your fingers of the affected limb.  Call your surgeon if they look discolored (that is, dusky, dark colored)  • Use caution in cold weather.  Cover your limb appropriately to protect it from the cold.  Pain Management  Your surgeon will give you a prescription for pain medication.  Begin taking this before the nerve block wears off.  Bear in mind that sometimes the block can wear off in the middle of the night.PATIENT/FAMILY/RESPONSIBLE PARTY VERBALIZES UNDERSTANDING OF ABOVE EDUCATION.  COPY OF PAIN SCALE GIVEN AND REVIEWED WITH VERBALIZED UNDERSTANDING.                          UPPER EXTREMITY POST-OP INSTRUCTIONS - DR. WILCOX    IMPORTANT PHONE NUMBERS:  • For emergencies, please call [phone]  • You may reach Dr. Wilcox and clinical staff at [phone]- M-F 8:00 am-5:00 pm  • After 5pm or on the weekends, please call [phone]  • Call immediately if you have any of the following  symptoms:     Elevated temperature above 101.5 degrees for more than 48 hours after surgery     Persistent drainage from wound     Severe pain around surgical site    Sling use: The sling is provided for your comfort and to ensure proper healing of your repair following surgery. Please place the abduction pillow with the curved side against your side and the sling on the side of the pillow. Your surgery requires that you wear the sling if noted below.  __X__ At all times except bathing, dressing, and therapy. Also wear the sling during sleep.    Bathing:  _X__You may remove your superficial dressings leaving your Steri-Strips in place and shower on the 3rd day after surgery (Ex. Monday surgery, shower on Thursday) **  DO NOT SOAK your dressings or incisions in a tub.    Dressings: Keep dressing/splint intact unless instructed otherwise below. SOME DRAINAGE IS NORMAL!    • DO NOT touch or apply ointment to the incision.    • DO NOT remove the steri-strips over the incisions (if you have steri-strips). They will         generally fall off on their own or can be removed 1 weeksafter surgery.    • If you have yellow gauze and it comes off, do not worry about it. Leave them off.   • Signs of infection that warrant a phone call to our clinical line:     o Excessive drainage or redness     o Red streaking coming away from the incision  o Increased pain  o Increased temperature above 101 degrees    Physical Therapy:        *  Your physical therapy status will be discussed with you postoperatively and at your first post-op appointment. Some injuries will not require physical therapy.      *  If you have a shoulder manipulation, please schedule therapy for the next day    Medications: You will be discharged with the appropriate medications following your surgery. Fill these at the pharmacy and take them as directed on the label. Not all of the medications below may be prescribed. Occasionally, other medications may be  prescribed with specific instructions.    Percocet/Lortab (oxycodone/hydrocodone with tylenol) - Pain Medication, will cause drowsiness, possibly itchiness (this is NOT an allergy - use benadryl or an over the counter allergy medication such as Claritin or Zyrtec)     o Take 1-2 tablets every 4-6 hours. DO NOT EXCEED 4,000mg of Tylenol in 24 hours.  **DO NOT MIX WITH ALCOHOL, DRIVE WHILE TAKING, OR TAKE with extra TYLENOL**    Colace (Docusate) - stool softener, used for constipation. Take this only if you feel constipated.    Zofran (Ondansetron) or Phenergan - Anti-nausea medication, will cause drowsiness    *Starting January 2021, all narcotic medication must be prescribed electronically to your pharmacy.  Be sure to notify nursing of your preferred pharmacy.  If you are running low on pain medications, please notify us if you need a refill 24-48 hours prior to when you run out, so we can make arrangements to refill the prescription for you if we determine is necessary

## 2021-05-24 NOTE — BRIEF OP NOTE
SHOULDER ARTHROSCOPY WITH ROTATOR CUFF REPAIR, RESECTION DISTAL CLAVICLE  Progress Note    Levi Hollingsworth  5/24/2021    Pre-op Diagnosis:   S46.012A       Post-Op Diagnosis Codes:   SAME    Procedure/CPT® Codes:  Procedure(s):  ARTHROSCOPIC LEFT ROTATOR CUFF REPAIR, ACROMIOPLASTY AND DISTAL CLAVICLE EXCISION  DISTAL CLAVICLE EXCISION    Surgeon(s):  Alex Hamilton MD    Anesthesia: Choice    Staff:   Circulator: Mita Oneal RN; Mitzi Fletcher RN  Scrub Person: Willow Guthrie Charles J     Estimated Blood Loss: minimal    Urine Voided: * No values recorded between 5/24/2021  1:41 PM and 5/24/2021  4:35 PM *    Specimens:                None    Drains: * No LDAs found *    Findings: Full-thickness tears of the infraspinatus and posterior fibers of supraspinatus with partial-thickness tearing of the subscapularis, left shoulder    Complications: None    Alex Hamilton MD     Date: 5/24/2021  Time: 16:38 CDT

## 2021-05-24 NOTE — ANESTHESIA PROCEDURE NOTES
Airway  Urgency: elective    Date/Time: 5/24/2021 1:45 PM  Airway not difficult    General Information and Staff    Patient location during procedure: OR  CRNA: Tomas Omalley CRNA    Indications and Patient Condition  Indications for airway management: airway protection    Preoxygenated: yes  MILS maintained throughout  Mask difficulty assessment: 1 - vent by mask    Final Airway Details  Final airway type: endotracheal airway      Successful airway: ETT  Cuffed: yes   Successful intubation technique: direct laryngoscopy  Endotracheal tube insertion site: oral  Blade: Gayle  Blade size: 2  ETT size (mm): 8.0  Cormack-Lehane Classification: grade IIb - view of arytenoids or posterior of glottis only  Placement verified by: chest auscultation   Cuff volume (mL): 10  Measured from: lips  ETT/EBT  to lips (cm): 23  Number of attempts at approach: 1  Assessment: lips, teeth, and gum same as pre-op and atraumatic intubation

## 2021-05-25 NOTE — ANESTHESIA POSTPROCEDURE EVALUATION
Patient: Levi Hollingsworth    Procedure Summary     Date: 05/24/21 Room / Location:  [facility]  /  [facility]    Anesthesia Start: 1341 Anesthesia Stop: 1638    Procedures:       ARTHROSCOPIC LEFT ROTATOR CUFF REPAIR, ACROMIOPLASTY AND DISTAL CLAVICLE EXCISION (Left Shoulder)      DISTAL CLAVICLE EXCISION (Left Shoulder) Diagnosis: (S46.012A)    Surgeons: Alex Hamilton MD Provider: Tomas Omalley CRNA    Anesthesia Type: general with block ASA Status: 3          Anesthesia Type: general with block    Vitals  Vitals Value Taken Time   /90 05/24/21 1805   Temp 97.3 °F (36.3 °C) 05/24/21 1750   Pulse 92 05/24/21 1811   Resp 18 05/24/21 1805   SpO2 89 % 05/24/21 1811   Vitals shown include unvalidated device data.        Post Anesthesia Care and Evaluation    Patient location during evaluation: PACU  Patient participation: complete - patient participated  Level of consciousness: awake and alert  Pain management: adequate  Airway patency: patent  Anesthetic complications: No anesthetic complications    Cardiovascular status: acceptable  Respiratory status: acceptable  Hydration status: acceptable    Comments: Blood pressure 172/88, pulse 70, temperature 97.3 °F (36.3 °C), temperature source Temporal, resp. rate 18, SpO2 92 %.    Pt discharged from PACU based on deandre score >8

## 2021-08-09 ENCOUNTER — APPOINTMENT (OUTPATIENT)
Dept: GENERAL RADIOLOGY | Facility: HOSPITAL | Age: 51
End: 2021-08-09

## 2021-08-09 ENCOUNTER — HOSPITAL ENCOUNTER (EMERGENCY)
Facility: HOSPITAL | Age: 51
Discharge: HOME | End: 2021-08-09
Attending: EMERGENCY MEDICINE | Admitting: EMERGENCY MEDICINE

## 2021-08-09 VITALS
HEIGHT: 71 IN | BODY MASS INDEX: 44.1 KG/M2 | RESPIRATION RATE: 20 BRPM | TEMPERATURE: 98.3 F | DIASTOLIC BLOOD PRESSURE: 83 MMHG | OXYGEN SATURATION: 97 % | WEIGHT: 315 LBS | SYSTOLIC BLOOD PRESSURE: 147 MMHG | HEART RATE: 70 BPM

## 2021-08-09 DIAGNOSIS — R00.2 PALPITATIONS: Primary | ICD-10-CM

## 2021-08-09 DIAGNOSIS — N17.9 ACUTE KIDNEY INJURY (HCC): ICD-10-CM

## 2021-08-09 DIAGNOSIS — J18.9 PNEUMONIA OF RIGHT LUNG DUE TO INFECTIOUS ORGANISM, UNSPECIFIED PART OF LUNG: ICD-10-CM

## 2021-08-09 LAB
ALBUMIN SERPL-MCNC: 4.8 G/DL (ref 3.5–5.2)
ALBUMIN/GLOB SERPL: 1.3 G/DL
ALP SERPL-CCNC: 114 U/L (ref 39–117)
ALT SERPL W P-5'-P-CCNC: 49 U/L (ref 1–41)
ANION GAP SERPL CALCULATED.3IONS-SCNC: 14 MMOL/L (ref 5–15)
AST SERPL-CCNC: 35 U/L (ref 1–40)
BASOPHILS # BLD AUTO: 0.06 10*3/MM3 (ref 0–0.2)
BASOPHILS NFR BLD AUTO: 0.5 % (ref 0–1.5)
BILIRUB SERPL-MCNC: 0.6 MG/DL (ref 0–1.2)
BUN SERPL-MCNC: 21 MG/DL (ref 6–20)
BUN/CREAT SERPL: 15.2 (ref 7–25)
CALCIUM SPEC-SCNC: 10.2 MG/DL (ref 8.6–10.5)
CHLORIDE SERPL-SCNC: 97 MMOL/L (ref 98–107)
CO2 SERPL-SCNC: 27 MMOL/L (ref 22–29)
CREAT SERPL-MCNC: 1.38 MG/DL (ref 0.76–1.27)
D DIMER PPP FEU-MCNC: 0.59 MG/L (FEU) (ref 0–0.5)
DEPRECATED RDW RBC AUTO: 43.6 FL (ref 37–54)
EOSINOPHIL # BLD AUTO: 0.14 10*3/MM3 (ref 0–0.4)
EOSINOPHIL NFR BLD AUTO: 1.2 % (ref 0.3–6.2)
ERYTHROCYTE [DISTWIDTH] IN BLOOD BY AUTOMATED COUNT: 13.2 % (ref 12.3–15.4)
GFR SERPL CREATININE-BSD FRML MDRD: 54 ML/MIN/1.73
GLOBULIN UR ELPH-MCNC: 3.8 GM/DL
GLUCOSE SERPL-MCNC: 109 MG/DL (ref 65–99)
HCT VFR BLD AUTO: 44.5 % (ref 37.5–51)
HGB BLD-MCNC: 15.3 G/DL (ref 13–17.7)
HOLD SPECIMEN: NORMAL
HOLD SPECIMEN: NORMAL
IMM GRANULOCYTES # BLD AUTO: 0.07 10*3/MM3 (ref 0–0.05)
IMM GRANULOCYTES NFR BLD AUTO: 0.6 % (ref 0–0.5)
LYMPHOCYTES # BLD AUTO: 2.62 10*3/MM3 (ref 0.7–3.1)
LYMPHOCYTES NFR BLD AUTO: 23.2 % (ref 19.6–45.3)
MAGNESIUM SERPL-MCNC: 1.9 MG/DL (ref 1.6–2.6)
MCH RBC QN AUTO: 30.8 PG (ref 26.6–33)
MCHC RBC AUTO-ENTMCNC: 34.4 G/DL (ref 31.5–35.7)
MCV RBC AUTO: 89.7 FL (ref 79–97)
MONOCYTES # BLD AUTO: 1.1 10*3/MM3 (ref 0.1–0.9)
MONOCYTES NFR BLD AUTO: 9.7 % (ref 5–12)
NEUTROPHILS NFR BLD AUTO: 64.8 % (ref 42.7–76)
NEUTROPHILS NFR BLD AUTO: 7.3 10*3/MM3 (ref 1.7–7)
NRBC BLD AUTO-RTO: 0 /100 WBC (ref 0–0.2)
PLATELET # BLD AUTO: 239 10*3/MM3 (ref 140–450)
PMV BLD AUTO: 10.9 FL (ref 6–12)
POTASSIUM SERPL-SCNC: 4.1 MMOL/L (ref 3.5–5.2)
PROT SERPL-MCNC: 8.6 G/DL (ref 6–8.5)
RBC # BLD AUTO: 4.96 10*6/MM3 (ref 4.14–5.8)
SODIUM SERPL-SCNC: 138 MMOL/L (ref 136–145)
TROPONIN T SERPL-MCNC: <0.01 NG/ML (ref 0–0.03)
TROPONIN T SERPL-MCNC: <0.01 NG/ML (ref 0–0.03)
TSH SERPL DL<=0.05 MIU/L-ACNC: 2.28 UIU/ML (ref 0.27–4.2)
WBC # BLD AUTO: 11.29 10*3/MM3 (ref 3.4–10.8)
WHOLE BLOOD HOLD SPECIMEN: NORMAL

## 2021-08-09 PROCEDURE — C9803 HOPD COVID-19 SPEC COLLECT: HCPCS | Performed by: EMERGENCY MEDICINE

## 2021-08-09 PROCEDURE — 96374 THER/PROPH/DIAG INJ IV PUSH: CPT

## 2021-08-09 PROCEDURE — 93010 ELECTROCARDIOGRAM REPORT: CPT | Performed by: INTERNAL MEDICINE

## 2021-08-09 PROCEDURE — 93005 ELECTROCARDIOGRAM TRACING: CPT | Performed by: EMERGENCY MEDICINE

## 2021-08-09 PROCEDURE — 84484 ASSAY OF TROPONIN QUANT: CPT | Performed by: EMERGENCY MEDICINE

## 2021-08-09 PROCEDURE — 85025 COMPLETE CBC W/AUTO DIFF WBC: CPT | Performed by: EMERGENCY MEDICINE

## 2021-08-09 PROCEDURE — 71045 X-RAY EXAM CHEST 1 VIEW: CPT

## 2021-08-09 PROCEDURE — U0004 COV-19 TEST NON-CDC HGH THRU: HCPCS | Performed by: EMERGENCY MEDICINE

## 2021-08-09 PROCEDURE — 84443 ASSAY THYROID STIM HORMONE: CPT | Performed by: EMERGENCY MEDICINE

## 2021-08-09 PROCEDURE — 99283 EMERGENCY DEPT VISIT LOW MDM: CPT

## 2021-08-09 PROCEDURE — 85379 FIBRIN DEGRADATION QUANT: CPT | Performed by: EMERGENCY MEDICINE

## 2021-08-09 PROCEDURE — 80053 COMPREHEN METABOLIC PANEL: CPT | Performed by: EMERGENCY MEDICINE

## 2021-08-09 PROCEDURE — 83735 ASSAY OF MAGNESIUM: CPT | Performed by: EMERGENCY MEDICINE

## 2021-08-09 RX ORDER — CLONIDINE HYDROCHLORIDE 0.1 MG/1
0.1 TABLET ORAL ONCE
Status: COMPLETED | OUTPATIENT
Start: 2021-08-09 | End: 2021-08-09

## 2021-08-09 RX ORDER — CEFDINIR 300 MG/1
300 CAPSULE ORAL 2 TIMES DAILY
Qty: 10 CAPSULE | Refills: 0 | Status: SHIPPED | OUTPATIENT
Start: 2021-08-09 | End: 2022-05-09

## 2021-08-09 RX ORDER — SODIUM CHLORIDE 0.9 % (FLUSH) 0.9 %
10 SYRINGE (ML) INJECTION AS NEEDED
Status: DISCONTINUED | OUTPATIENT
Start: 2021-08-09 | End: 2021-08-10 | Stop reason: HOSPADM

## 2021-08-09 RX ORDER — ASPIRIN 81 MG/1
324 TABLET, CHEWABLE ORAL ONCE
Status: COMPLETED | OUTPATIENT
Start: 2021-08-09 | End: 2021-08-09

## 2021-08-09 RX ORDER — ENALAPRILAT 2.5 MG/2ML
1.25 INJECTION INTRAVENOUS ONCE
Status: COMPLETED | OUTPATIENT
Start: 2021-08-09 | End: 2021-08-09

## 2021-08-09 RX ORDER — AZITHROMYCIN 250 MG/1
TABLET, FILM COATED ORAL
Qty: 6 TABLET | Refills: 0 | Status: SHIPPED | OUTPATIENT
Start: 2021-08-09 | End: 2022-05-09

## 2021-08-09 RX ADMIN — SODIUM CHLORIDE, POTASSIUM CHLORIDE, SODIUM LACTATE AND CALCIUM CHLORIDE 1000 ML: 600; 310; 30; 20 INJECTION, SOLUTION INTRAVENOUS at 21:53

## 2021-08-09 RX ADMIN — CLONIDINE HYDROCHLORIDE 0.1 MG: 0.1 TABLET ORAL at 21:49

## 2021-08-09 RX ADMIN — ENALAPRILAT 1.25 MG: 2.5 INJECTION INTRAVENOUS at 21:49

## 2021-08-09 RX ADMIN — ASPIRIN 324 MG: 81 TABLET, CHEWABLE ORAL at 19:38

## 2021-08-10 ENCOUNTER — TELEPHONE (OUTPATIENT)
Dept: EMERGENCY DEPT | Facility: HOSPITAL | Age: 51
End: 2021-08-10

## 2021-08-10 LAB
QT INTERVAL: 362 MS
QT INTERVAL: 364 MS
QTC INTERVAL: 438 MS
QTC INTERVAL: 438 MS
SARS-COV-2 ORF1AB RESP QL NAA+PROBE: NOT DETECTED

## 2021-08-10 NOTE — DISCHARGE INSTRUCTIONS
You were seen in the emergency department for palpitations.  He did not have any abnormal beats on the cardiac monitor during your ER stay.  Your white blood cell count was little elevated and the chest x-ray was concerning for developing pneumonia on the right upper lobe.  Additionally, you have mild acute kidney injury.  This could be secondary to dehydration.  You will be prescribed antibiotics for your pneumonia.  We will order Holter monitor for your palpitations.  You need to follow-up with your primary care doctor regarding your acute kidney injury in 2 days for repeat labs.  Return to the ER if you develop worsening symptoms.  A Covid swab was sent but results not be back for 1 to 2 days.  You should quarantine at home until your results come back.

## 2021-08-10 NOTE — ED PROVIDER NOTES
"Subjective   51-year-old male presents to the ED with complaint of palpitations.  History of hypertension, hyperlipidemia, diabetes, GERD.  Reports 2-day history of intermittent episodes of palpitations.  He describes the palpitations as his heart \"flip flopping\".  He states palpitations are associate with mild chest tightness.  No nausea, diaphoresis but does complain of mild shortness of breath.  Had similar episode 1 year ago, wore an outpatient cardiac monitor which did not detect any arrhythmias.  No recent travel, no unilateral leg pain or swelling, no history of DVT or PE.      History provided by:  Patient      Review of Systems   All other systems reviewed and are negative.      Past Medical History:   Diagnosis Date   • Arthritis    • Diabetes mellitus (CMS/HCC)    • GERD (gastroesophageal reflux disease)    • Hyperlipidemia    • Hypertension        No Known Allergies    Past Surgical History:   Procedure Laterality Date   • KNEE SURGERY      X 4 LEFT    • RESECTION DISTAL CLAVICLE Left 5/24/2021    Procedure: DISTAL CLAVICLE EXCISION;  Surgeon: Alxe Hamilton MD;  Location:  [facility] OR;  Service: Orthopedics;  Laterality: Left;   • SHOULDER ARTHROSCOPY W/ ROTATOR CUFF REPAIR Left 5/24/2021    Procedure: ARTHROSCOPIC LEFT ROTATOR CUFF REPAIR, ACROMIOPLASTY AND DISTAL CLAVICLE EXCISION;  Surgeon: Alex Hamilton MD;  Location:  [facility] OR;  Service: Orthopedics;  Laterality: Left;   • SHOULDER SURGERY      RIGHT       Family History   Problem Relation Age of Onset   • Heart disease Mother    • Heart failure Mother        Social History     Socioeconomic History   • Marital status: Single     Spouse name: Not on file   • Number of children: Not on file   • Years of education: Not on file   • Highest education level: Not on file   Tobacco Use   • Smoking status: Former Smoker     Years: 10.00     Types: Cigarettes   • Smokeless tobacco: Current User     Types: Chew   Substance and Sexual Activity   • Alcohol " use: Yes     Comment: OCC   • Drug use: Never   • Sexual activity: Defer           Objective   Physical Exam  Vitals and nursing note reviewed.   Constitutional:       General: He is not in acute distress.     Appearance: Normal appearance. He is normal weight.   HENT:      Head: Normocephalic and atraumatic.      Nose: Nose normal.      Mouth/Throat:      Mouth: Mucous membranes are moist.      Pharynx: Oropharynx is clear. No oropharyngeal exudate or posterior oropharyngeal erythema.   Eyes:      Extraocular Movements: Extraocular movements intact.      Conjunctiva/sclera: Conjunctivae normal.      Pupils: Pupils are equal, round, and reactive to light.   Cardiovascular:      Rate and Rhythm: Normal rate and regular rhythm.      Pulses: Normal pulses.      Heart sounds: Normal heart sounds.   Pulmonary:      Effort: Pulmonary effort is normal.      Breath sounds: Normal breath sounds. No wheezing, rhonchi or rales.   Abdominal:      General: Abdomen is flat. Bowel sounds are normal. There is no distension.      Palpations: Abdomen is soft.      Tenderness: There is no abdominal tenderness.   Musculoskeletal:         General: No tenderness. Normal range of motion.      Cervical back: Normal range of motion and neck supple. No rigidity. No muscular tenderness.      Right lower leg: No edema.      Left lower leg: No edema.   Skin:     General: Skin is warm and dry.      Capillary Refill: Capillary refill takes less than 2 seconds.      Findings: No rash.   Neurological:      General: No focal deficit present.      Mental Status: He is alert and oriented to person, place, and time. Mental status is at baseline.      Cranial Nerves: No cranial nerve deficit.      Sensory: No sensory deficit.      Motor: No weakness.   Psychiatric:         Mood and Affect: Mood normal.         Behavior: Behavior normal.         Thought Content: Thought content normal.         ECG 12 Lead      Date/Time: 8/9/2021 6:54 PM  Performed by:  Colt Bruce MD  Authorized by: Colt Bruce MD   Interpreted by physician  Comments: Normal sinus rhythm, rate 88, inferior Q waves, no acute ischemic changes, abnormal EKG             Lab Results (last 24 hours)     Procedure Component Value Units Date/Time    Comprehensive Metabolic Panel [[id number]]  (Abnormal) Collected: 08/09/21 1845    Specimen: Blood Updated: 08/09/21 1926     Glucose 109 mg/dL      BUN 21 mg/dL      Creatinine 1.38 mg/dL      Sodium 138 mmol/L      Potassium 4.1 mmol/L      Chloride 97 mmol/L      CO2 27.0 mmol/L      Calcium 10.2 mg/dL      Total Protein 8.6 g/dL      Albumin 4.80 g/dL      ALT (SGPT) 49 U/L      AST (SGOT) 35 U/L      Alkaline Phosphatase 114 U/L      Total Bilirubin 0.6 mg/dL      eGFR Non African Amer 54 mL/min/1.73      Globulin 3.8 gm/dL      A/G Ratio 1.3 g/dL      BUN/Creatinine Ratio 15.2     Anion Gap 14.0 mmol/L     Narrative:      GFR Normal >60  Chronic Kidney Disease <60  Kidney Failure <15      Troponin [[id number]]  (Normal) Collected: 08/09/21 1845    Specimen: Blood Updated: 08/09/21 1924     Troponin T <0.010 ng/mL     Narrative:      Troponin T Reference Range:  <= 0.03 ng/mL-   Negative for AMI  >0.03 ng/mL-     Abnormal for myocardial necrosis.  Clinicians would have to utilize clinical acumen, EKG, Troponin and serial changes to determine if it is an Acute Myocardial Infarction or myocardial injury due to an underlying chronic condition.       Results may be falsely decreased if patient taking Biotin.      Magnesium [[id number]]  (Normal) Collected: 08/09/21 1845    Specimen: Blood Updated: 08/09/21 1920     Magnesium 1.9 mg/dL     TSH [[id number]]  (Normal) Collected: 08/09/21 1845    Specimen: Blood Updated: 08/09/21 1931     TSH 2.280 uIU/mL     CBC & Differential [[id number]]  (Abnormal) Collected: 08/09/21 1900    Specimen: Blood Updated: 08/09/21 1910    Narrative:      The following orders were created for panel order CBC &  Differential.  Procedure                               Abnormality         Status                     ---------                               -----------         ------                     CBC Auto Differential[[id number]]        Abnormal            Final result                 Please view results for these tests on the individual orders.    CBC Auto Differential [[id number]]  (Abnormal) Collected: 08/09/21 1900    Specimen: Blood Updated: 08/09/21 1910     WBC 11.29 10*3/mm3      RBC 4.96 10*6/mm3      Hemoglobin 15.3 g/dL      Hematocrit 44.5 %      MCV 89.7 fL      MCH 30.8 pg      MCHC 34.4 g/dL      RDW 13.2 %      RDW-SD 43.6 fl      MPV 10.9 fL      Platelets 239 10*3/mm3      Neutrophil % 64.8 %      Lymphocyte % 23.2 %      Monocyte % 9.7 %      Eosinophil % 1.2 %      Basophil % 0.5 %      Immature Grans % 0.6 %      Neutrophils, Absolute 7.30 10*3/mm3      Lymphocytes, Absolute 2.62 10*3/mm3      Monocytes, Absolute 1.10 10*3/mm3      Eosinophils, Absolute 0.14 10*3/mm3      Basophils, Absolute 0.06 10*3/mm3      Immature Grans, Absolute 0.07 10*3/mm3      nRBC 0.0 /100 WBC     D-dimer, Quantitative [[id number]]  (Abnormal) Collected: 08/09/21 1944    Specimen: Blood Updated: 08/09/21 2004     D-Dimer, Quantitative 0.59 mg/L (FEU)     Narrative:      Reference Range is 0-0.50 mg/L FEU. However, results <0.50 mg/L FEU tends to rule out DVT or PE. Results >0.50 mg/L FEU are not useful in predicting absence or presence of DVT or PE.      Troponin [[id number]]  (Normal) Collected: 08/09/21 2049    Specimen: Blood Updated: 08/09/21 2124     Troponin T <0.010 ng/mL     Narrative:      Troponin T Reference Range:  <= 0.03 ng/mL-   Negative for AMI  >0.03 ng/mL-     Abnormal for myocardial necrosis.  Clinicians would have to utilize clinical acumen, EKG, Troponin and serial changes to determine if it is an Acute Myocardial Infarction or myocardial injury due to an underlying chronic condition.       Results may be  falsely decreased if patient taking Biotin.        XR Chest 1 View    Result Date: 8/9/2021  EXAMINATION: Chest 1 view [id number]  HISTORY: Chest pain  FINDINGS: Upright frontal projection of chest demonstrates a patchy right upper lobe infiltrate. Lungs are otherwise clear. There is no effusion or free air present. The mediastinal contours are within normal limits.      . Suspected developing patchy right upper lobe infiltrate. Lungs are otherwise clear. This report was finalized on 08/09/2021 20:26 by Dr. Jose Angel Matute MD.      ED Course  ED Course as of Aug 09 2143   Mon Aug 09, 2021   2133 Patient is feeling better, has not had any recurrent episodes of palpitations since arrival to the ER.  Mild dehydration, received fluids in the ED.  Chest x-ray showed developing infiltrate in the right upper lobe.  WBC is elevated to 11.29.    No abnormal rhythm on telemetry while in the hospital.  Will order Holter monitor, give antibiotics for his developing pneumonia and pelvis primary doctor for his mild MEDINA.    [AW]      ED Course User Index  [AW] Colt Bruce MD                                           MDM  Number of Diagnoses or Management Options  Acute kidney injury (CMS/HCC): new and requires workup  Palpitations: new and requires workup  Pneumonia of right lung due to infectious organism, unspecified part of lung: new and requires workup     Amount and/or Complexity of Data Reviewed  Clinical lab tests: reviewed  Tests in the radiology section of CPT®: reviewed  Tests in the medicine section of CPT®: reviewed  Decide to obtain previous medical records or to obtain history from someone other than the patient: yes    Risk of Complications, Morbidity, and/or Mortality  Presenting problems: high  Diagnostic procedures: high  Management options: high    Patient Progress  Patient progress: stable      Final diagnoses:   Palpitations   Pneumonia of right lung due to infectious organism, unspecified part of  lung   Acute kidney injury (CMS/HCC)       ED Disposition  ED Disposition     ED Disposition Condition Comment    Discharge Stable           Maegan Matos, PA  [address] [facility] [address]  [phone]    In 2 days           Medication List      New Prescriptions    azithromycin 250 MG tablet  Commonly known as: Zithromax Z-Kade  Take 2 tablets by mouth on day 1, then 1 tablet daily on days 2-5     cefdinir 300 MG capsule  Commonly known as: OMNICEF  Take 1 capsule by mouth 2 (Two) Times a Day.           Where to Get Your Medications      These medications were sent to [facility] [address] [place] [address] - [address] [facility] - [phone]  - [phone]   [address] [place] [address]    Phone: [phone]   · azithromycin 250 MG tablet  · cefdinir 300 MG capsule          Colt Bruce MD  08/09/21 8250

## 2021-08-10 NOTE — TELEPHONE ENCOUNTER
----- Message from NIDIA Beatty sent at 8/10/2021  8:06 AM CDT -----  Please let pt know neg results, thanks

## 2021-08-18 ENCOUNTER — HOSPITAL ENCOUNTER (OUTPATIENT)
Dept: CARDIOLOGY | Facility: HOSPITAL | Age: 51
Discharge: HOME | End: 2021-08-18
Admitting: FAMILY MEDICINE

## 2021-08-18 DIAGNOSIS — R00.2 PALPITATIONS: ICD-10-CM

## 2021-08-18 PROCEDURE — 93225 XTRNL ECG REC<48 HRS REC: CPT

## 2021-08-26 LAB
MAXIMAL PREDICTED HEART RATE: 168 BPM
STRESS TARGET HR: 143 BPM

## 2021-08-26 PROCEDURE — 93227 XTRNL ECG REC<48 HR R&I: CPT | Performed by: EMERGENCY MEDICINE

## 2021-09-20 ENCOUNTER — TRANSCRIBE ORDERS (OUTPATIENT)
Dept: PHYSICAL THERAPY | Facility: CLINIC | Age: 51
End: 2021-09-20

## 2021-09-20 DIAGNOSIS — Z47.89 AFTERCARE FOLLOWING SURGERY OF THE MUSCULOSKELETAL SYSTEM: Primary | ICD-10-CM

## 2021-09-22 ENCOUNTER — TREATMENT (OUTPATIENT)
Dept: PHYSICAL THERAPY | Facility: CLINIC | Age: 51
End: 2021-09-22

## 2021-09-22 DIAGNOSIS — S46.012S TRAUMATIC COMPLETE TEAR OF LEFT ROTATOR CUFF, SEQUELA: ICD-10-CM

## 2021-09-22 DIAGNOSIS — Z47.89 AFTERCARE FOLLOWING SURGERY OF THE MUSCULOSKELETAL SYSTEM: Primary | ICD-10-CM

## 2021-09-22 DIAGNOSIS — M25.512 CHRONIC LEFT SHOULDER PAIN: ICD-10-CM

## 2021-09-22 DIAGNOSIS — G89.29 CHRONIC LEFT SHOULDER PAIN: ICD-10-CM

## 2021-09-22 PROCEDURE — 97162 PT EVAL MOD COMPLEX 30 MIN: CPT | Performed by: PHYSICAL THERAPIST

## 2021-09-22 NOTE — PROGRESS NOTES
Physical Therapy Therapy Initial Evaluation and Plan of Care    Patient: Levi Hollingsworth               : 1970  Visit Date: 2021  Referring practitioner: Alex Hamilton MD  Date of Initial Visit: 2021  Patient seen for 1 sessions    Visit Diagnoses:    ICD-10-CM ICD-9-CM   1. Aftercare following surgery of the musculoskeletal system  Z47.89 V58.78   2. Traumatic complete tear of left rotator cuff, sequela  S46.012S 905.7   3. Chronic left shoulder pain  M25.512 719.41    G89.29 338.29     Past Medical History:   Diagnosis Date   • Arthritis    • Diabetes mellitus (CMS/HCC)    • GERD (gastroesophageal reflux disease)    • Hyperlipidemia    • Hypertension      Past Surgical History:   Procedure Laterality Date   • KNEE SURGERY      X 4 LEFT    • RESECTION DISTAL CLAVICLE Left 2021    Procedure: DISTAL CLAVICLE EXCISION;  Surgeon: Alex Hamilton MD;  Location: [facility] OR;  Service: Orthopedics;  Laterality: Left;   • SHOULDER ARTHROSCOPY W/ ROTATOR CUFF REPAIR Left 2021    Procedure: ARTHROSCOPIC LEFT ROTATOR CUFF REPAIR, ACROMIOPLASTY AND DISTAL CLAVICLE EXCISION;  Surgeon: Alex Hamilton MD;  Location: [facility] OR;  Service: Orthopedics;  Laterality: Left;   • SHOULDER SURGERY      RIGHT         SUBJECTIVE     Subjective Evaluation    History of Present Illness  Date of onset: 2021  Date of surgery: 2021  Mechanism of injury: He had tears to 3 of 4 rotator cuff muscles and biceps detachment with retraction trying to catch falling windows while working. He had surgery on  to repair and was in a sling for 6 weeks. He had started PT in early July at another clinic. This occurred in February and he did not have surgery until late May.    Subjective comment: He has been getting PT at  and just got called Monday that he had to switch to us.  Patient Occupation:    Precautions and Work Restrictions: No lifting over 5 lbs. LUEQuality of life: good    Pain  Current  pain rating: 3  At best pain ratin  At worst pain ratin  Location: L shoulder  Quality: dull ache  Relieving factors: change in position and rest  Aggravating factors: outstretched reach and lifting    Social Support  Lives with: alone    Hand dominance: right    Patient Goals  Patient goals for therapy: decreased pain, increased motion, increased strength and return to work         Outcome Measure:   PT G-Codes  Outcome Measure Options: Quick DASH  Quick DASH Score: 29.55    OBJECTIVE     Objective          Static Posture     Head  Forward.    Shoulders  Rounded.    Neurological Testing     Sensation     Shoulder   Left Shoulder   Intact: light touch    Active Range of Motion   Left Shoulder   Flexion: 71 degrees     Right Shoulder   Normal active range of motion    Left Wrist   Normal active range of motion    Right Wrist   Normal active range of motion    Passive Range of Motion   Left Shoulder   Flexion: 134 degrees with pain  External rotation 45°: 40 degrees with pain  Internal rotation 45°: 55 degrees with pain  Horizontal adduction: Left shoulder passive horizontal adduction: firm block after about 75% of motion. with pain    Additional Passive Range of Motion Details  Resistance to further motion felt mainly at distal clavicle.     Strength/Myotome Testing     Left Elbow   Flexion: 5    Right Elbow   Flexion: 5    Left Wrist/Hand   Normal wrist strength     (2nd hand position)     Comments: strong    Right Wrist/Hand   Normal wrist strength     (2nd hand position)     Comments: strong      Therapy Education/Self Care 41147   Details: Support UE on table or counter and stretch into flexion/scaption- don't bounce! Focus more on stretching at end range than lifting the arm against gravity. Supine shoulder flexion with hold at end range.    Given Home Exercise Program   Progress: New   Education provided to:  Patient   Level of understanding Verbalized   Timed Minutes          Total Timed  Treatment:        mins  Total Time of Visit:            75   mins    ASSESSMENT/PLAN     GOALS:  Goals                                          Progress Note due by 10/22/2021                                                      Recert due by 12/22/2021   STG by: 3 weeks Comments Date Status   In supine, he will have L shoulder flexion equal to R.  9/22 New   Will be able to passively get L hand to R shoulder with therapist assist  9/22 New   AROM against gravity L shoulder flexion 100 or greater  9/22 New         LTG by: 6 weeks      B shoulder elevation nearly equal.  9/22 New   Strengthening tolerated once lifting restrictions cleared with minimal pain  9/22 New   Able to actively perform horizontal adduction and touch R shoulder with L hand  9/22 New   Progress treatment and HEP as allowed per surgeon.   9/22 New   Return to work training as indicated  9/22 New               Assessment & Plan     Assessment  Impairments: abnormal or restricted ROM, activity intolerance, impaired physical strength, lacks appropriate home exercise program and pain with function  Assessment details: Mr. Hollingsworth has been working more on AROM exercises with less focus on joint mobility. He is still under a 5 lb lifting limit as far as he knows. We need to focus on improving the mechanics and mobility in the L shoulder before progressing AROM against gravity. He seems to be having restrictions near end range due to the tightness of the repaired tendons as well as the capsule. The tendons were obviously shortened with the surgery and the 3+ month wait for surgery after injury. The way the shoulder is so compacted together is going to continue to reduce his ability to actively move the arm and needs to improve. He would even benefit from some manual work to the thoracic spine to improve mobility and allow more upright posture and therefore better shoulder alignment.   Barriers to therapy: none  Prognosis: good  Functional Limitations:  carrying objects, lifting, pulling, pushing, uncomfortable because of pain, reaching behind back and reaching overhead  Plan  Therapy options: will be seen for skilled physical therapy services  Planned modality interventions: low level laser therapy, high voltage pulsed current (pain management) and dry needling  Planned therapy interventions: manual therapy, neuromuscular re-education, soft tissue mobilization, spinal/joint mobilization, strengthening, stretching, therapeutic activities, transfer training, joint mobilization, home exercise program, functional ROM exercises and flexibility  Frequency: 3x week (2-3 X/wk)  Duration in weeks: 6  Treatment plan discussed with: patient  Plan details: PT to focus early on manual treatment to improve mechanics and ROM of L shoulder. Progress as tolerated and following restrictions from surgeon as ordered. Cont 5 lb lifting restriction at this time .        PT SIGNATURE: Diana Aj, PT, DPT, CLT-JESSICA       Initial Certification  Certification Period: 9/22/2021 through 12/21/2021  I certify that the therapy services are furnished while this patient is under my care.  The services outlined above are required by this patient, and will be reviewed every 90 days.     PHYSICIAN:     Alex Hamilton MD______________________________________DATE: _________     Please sign and return via fax to [phone].   Thank you so much for letting us work with Levi. I appreciate your letting us work with your patients. If you have any questions or concerns, please don't hesitate to contact me.

## 2021-09-24 ENCOUNTER — TREATMENT (OUTPATIENT)
Dept: PHYSICAL THERAPY | Facility: CLINIC | Age: 51
End: 2021-09-24

## 2021-09-24 DIAGNOSIS — M25.512 CHRONIC LEFT SHOULDER PAIN: ICD-10-CM

## 2021-09-24 DIAGNOSIS — Z47.89 AFTERCARE FOLLOWING SURGERY OF THE MUSCULOSKELETAL SYSTEM: Primary | ICD-10-CM

## 2021-09-24 DIAGNOSIS — S46.012S TRAUMATIC COMPLETE TEAR OF LEFT ROTATOR CUFF, SEQUELA: ICD-10-CM

## 2021-09-24 DIAGNOSIS — G89.29 CHRONIC LEFT SHOULDER PAIN: ICD-10-CM

## 2021-09-24 PROCEDURE — 97140 MANUAL THERAPY 1/> REGIONS: CPT | Performed by: PHYSICAL THERAPIST

## 2021-09-24 PROCEDURE — 97110 THERAPEUTIC EXERCISES: CPT | Performed by: PHYSICAL THERAPIST

## 2021-09-24 NOTE — PROGRESS NOTES
Physical Therapy Treatment Note    Patient: Levi Hollingsworth                                                                                     Visit Date: 2021  :     1970    Referring practitioner:    Alex Hamilton MD  Date of Initial Visit:          Type: THERAPY  Noted: 2021    Patient seen for 2 sessions    Visit Diagnoses:    ICD-10-CM ICD-9-CM   1. Aftercare following surgery of the musculoskeletal system  Z47.89 V58.78   2. Traumatic complete tear of left rotator cuff, sequela  S46.012S 905.7   3. Chronic left shoulder pain  M25.512 719.41    G89.29 338.29     SUBJECTIVE     Subjective He's feeling pretty good today. He tried to sleep in his bed but was unable due to rolling over on it. Yesterday wasn't such a good day. It's possible the temperature change has gotten to him.     PAIN: 1-210 > 3/10     OBJECTIVE     Objective      Manual Therapy     91575  Comments   STM w/ massage cream to L shoulder and biceps    L GH posterior/inferior mobilizations w/ intermittent distraction         Timed Minutes 30     Therapeutic Exercises    05102 Comments   L GH PROM into abd, flex, and IR/ER    L GH sleeper stretch Added to HEP                Timed Minutes 10     Therapy Education/Self Care 11527   Details: Issued formal HEP sleeper stretch; encouraged pt to not push past pain but to only look for a gentle stretch. Advised pt to let therapist know what he is doing at home.    Given Home Exercise Program  [email or web address] [email or web address] (access code [email or web address])   Progress: New and Reinforced   Education provided to:  Patient   Level of understanding Verbalized   Timed Minutes      Access Code: [email or web address]  URL: [email or web address]  Date: 2021  Prepared by: Shelby Rios    Exercises  Sleeper Stretch - 1-2 x daily - 7 x weekly - 3 reps - 30 sec hold      Total Timed Treatment:     40   mins  Total Time of Visit:             45   mins         ASSESSMENT/PLAN     GOALS  Goals                                           Progress Note due by 10/22/2021                                                      Recert due by 12/22/2021   STG by: 3 weeks Comments Date Status   In supine, he will have L shoulder flexion equal to R.   9/22 New   Will be able to passively get L hand to R shoulder with therapist assist   9/22 New   AROM against gravity L shoulder flexion 100 or greater   9/22 New             LTG by: 6 weeks         B shoulder elevation nearly equal.   9/22 New   Strengthening tolerated once lifting restrictions cleared with minimal pain   9/22 New   Able to actively perform horizontal adduction and touch R shoulder with L hand   9/22 New   Progress treatment and HEP as allowed per surgeon.   Issued formal HEP sleeper IR stretch  9/24 New   Return to work training as indicated   9/22 New         Assessment/Plan     ASSESSMENT: Today was Levi's first visit following his initial evaluation therefore no goals have been met at this time. We focused primarily on decreasing his soft tissue restrictions and increasing his L GH mobility. He presented w/ most of his guarding anteriorly. He reported sensitivity during posterior GH mobs. Pt presented the most limited passively into IR today and was therefore issued IR sleeper stretch for HEP. He was inquisitive about if he should continue to perform HEP given by [facility] which included active and resistive exercises such as AROM flexion and TB rows, and he was advised to prioritize ROM for now.     PLAN: Continue to prioritize L GH mobility and gentle strengthening. Address soft tissue restrictions as needed.     Signature: Shelby Rios, PTA

## 2021-09-27 ENCOUNTER — TREATMENT (OUTPATIENT)
Dept: PHYSICAL THERAPY | Facility: CLINIC | Age: 51
End: 2021-09-27

## 2021-09-27 DIAGNOSIS — Z47.89 AFTERCARE FOLLOWING SURGERY OF THE MUSCULOSKELETAL SYSTEM: Primary | ICD-10-CM

## 2021-09-27 DIAGNOSIS — G89.29 CHRONIC LEFT SHOULDER PAIN: ICD-10-CM

## 2021-09-27 DIAGNOSIS — S46.012S TRAUMATIC COMPLETE TEAR OF LEFT ROTATOR CUFF, SEQUELA: ICD-10-CM

## 2021-09-27 DIAGNOSIS — M25.512 CHRONIC LEFT SHOULDER PAIN: ICD-10-CM

## 2021-09-27 PROCEDURE — 97110 THERAPEUTIC EXERCISES: CPT | Performed by: PHYSICAL THERAPIST

## 2021-09-27 PROCEDURE — 97140 MANUAL THERAPY 1/> REGIONS: CPT | Performed by: PHYSICAL THERAPIST

## 2021-09-27 NOTE — PROGRESS NOTES
Physical Therapy Treatment Note    Patient: Levi Hollingsworth                                                                                     Visit Date: 2021  :     1970    Referring practitioner:    Alex Hamilton MD  Date of Initial Visit:          Type: THERAPY  Noted: 2021    Patient seen for 3 sessions    Visit Diagnoses:    ICD-10-CM ICD-9-CM   1. Aftercare following surgery of the musculoskeletal system  Z47.89 V58.78   2. Traumatic complete tear of left rotator cuff, sequela  S46.012S 905.7   3. Chronic left shoulder pain  M25.512 719.41    G89.29 338.29     SUBJECTIVE     Subjective Pt reports that he is feeling pretty good this morning.      PAIN: 1-2/10 > 3/10     OBJECTIVE     Objective      Manual Therapy     43960  Comments   L GH posterior/inferior mobilizations w/ intermittent distraction         Timed Minutes 10     Therapeutic Exercises    03573 Comments   L GH PROM into abd, flex, and IR/ER    L flexion with 3 lb t-bar    Horizontal abduction and adduction with 3lb bar    Arm ergometer resistance 1.5 6 min, 3 fwd/3 bwd       Timed Minutes 35     Therapy Education/Self Care 53083   Details: Horizontal abduction and adduction at home with broom stick    Given Home Exercise Program  [employer] (access code [email or web address])   Progress: New and Reinforced   Education provided to:  Patient   Level of understanding Verbalized   Timed Minutes      Access Code: [email or web address]  URL: [email or web address]  Date: 2021  Prepared by: Shelby Rios    Exercises  Sleeper Stretch - 1-2 x daily - 7 x weekly - 3 reps - 30 sec hold      Total Timed Treatment:     45   mins  Total Time of Visit:             45   mins         ASSESSMENT/PLAN     GOALS  Goals                                          Progress Note due by 10/22/2021                                                      Recert due by 2021   STG by: 3 weeks Comments Date Status   In supine, he will have L shoulder flexion  equal to R.  addressed with passive and active ROM 9/22 New   Will be able to passively get L hand to R shoulder with therapist assist   9/22 New   AROM against gravity L shoulder flexion 100 or greater   9/22 New             LTG by: 6 weeks         B shoulder elevation nearly equal.   9/22 New   Strengthening tolerated once lifting restrictions cleared with minimal pain   9/22 New   Able to actively perform horizontal adduction and touch R shoulder with L hand   9/22 New   Progress treatment and HEP as allowed per surgeon.   Issued formal HEP sleeper IR stretch  9/24 New   Return to work training as indicated   9/22 New         Assessment/Plan     ASSESSMENT: Overall his primary range limitation is with L shoulder adduction, while other ranges are limited at end range his primary concern is the adduction. We will continue to work on end range of motion while progressing with gradual strengthening.    PLAN: Continue to prioritize L GH mobility and progress with proximal strengthening      Signature: Jesus Alberto Zhang, PT DPT

## 2021-09-29 ENCOUNTER — TREATMENT (OUTPATIENT)
Dept: PHYSICAL THERAPY | Facility: CLINIC | Age: 51
End: 2021-09-29

## 2021-09-29 DIAGNOSIS — Z47.89 AFTERCARE FOLLOWING SURGERY OF THE MUSCULOSKELETAL SYSTEM: Primary | ICD-10-CM

## 2021-09-29 DIAGNOSIS — S46.012S TRAUMATIC COMPLETE TEAR OF LEFT ROTATOR CUFF, SEQUELA: ICD-10-CM

## 2021-09-29 DIAGNOSIS — M25.512 CHRONIC LEFT SHOULDER PAIN: ICD-10-CM

## 2021-09-29 DIAGNOSIS — G89.29 CHRONIC LEFT SHOULDER PAIN: ICD-10-CM

## 2021-09-29 PROCEDURE — 97140 MANUAL THERAPY 1/> REGIONS: CPT | Performed by: PHYSICAL THERAPIST

## 2021-09-29 PROCEDURE — 97110 THERAPEUTIC EXERCISES: CPT | Performed by: PHYSICAL THERAPIST

## 2021-09-29 NOTE — PROGRESS NOTES
Physical Therapy Treatment Note    Patient: Levi Hollingsworth                                                                                     Visit Date: 2021  :     1970    Referring practitioner:    No ref. provider found  Date of Initial Visit:          Type: THERAPY  Noted: 2021    Patient seen for 4 sessions    Visit Diagnoses:    ICD-10-CM ICD-9-CM   1. Aftercare following surgery of the musculoskeletal system  Z47.89 V58.78   2. Traumatic complete tear of left rotator cuff, sequela  S46.012S 905.7   3. Chronic left shoulder pain  M25.512 719.41    G89.29 338.29     SUBJECTIVE     Subjective Patient states yesterday was pretty rough, he had some pain with the bar exercises for across his body. His pain got up to 3.5-4/10 most of the day. He goes back to the Dr. In November. He has an ache with lying flat, most of the time he sleeps in a chair. He states he feels like there is something that blocks him in the L shoulder when he tries to put the L hand on the R shoulder.  He has had more popping the last couple of months in the L shoulder.  He has a home estim unit he uses on the L shoulder for pain along with ice.     PAIN: 2/10 L pec area and across the bicep muscle     OBJECTIVE     Objective      Manual Therapy     40228  Comments   L GH posterior/inferior mobilizations     STM to the L pec and upper arm Mod pressure           Timed Minutes 20     Therapeutic Exercises    23071 Comments   L GH PROM into abd, flex, and ER Manually stabilized joint when stretching into flexion   L flexion with 3 lb t-bar    Eccentric L shoulder flexion starting at 90 degrees 2 x 5   Stressed importance of posture so the L shoulder is in the best position to increase L shoulder ROM    Had patient sit with a towel roll along spine Added to HEP       Timed Minutes 30     Therapy Education/Self Care 67646   Details: Horizontal abduction and adduction at home with broom stick, work on posture awareness and use a  towel roll along spine when sitting in kitchen chair.   Given Home Exercise Program  [employer] HEP (access code [email or web address])   Progress: New and Reinforced   Education provided to:  Patient and demonstrtaed   Level of understanding Verbalized   Timed Minutes      Access Code: [email or web address]  URL: [email or web address]  Date: 09/24/2021  Prepared by: Shelby Rios    Exercises  Sleeper Stretch - 1-2 x daily - 7 x weekly - 3 reps - 30 sec hold      Total Timed Treatment:     50   mins  Total Time of Visit:             50   mins         ASSESSMENT/PLAN     GOALS  Goals                                          Progress Note due by 10/22/2021                                                      Recert due by 12/22/2021   STG by: 3 weeks Comments Date Status   In supine, he will have L shoulder flexion equal to R.  addressed with passive and active ROM 9/22 New   Will be able to passively get L hand to R shoulder with therapist assist   9/22 New   AROM against gravity L shoulder flexion 100 or greater   9/22 New             LTG by: 6 weeks         B shoulder elevation nearly equal.   9/22 New   Strengthening tolerated once lifting restrictions cleared with minimal pain   9/22 New   Able to actively perform horizontal adduction and touch R shoulder with L hand   9/22 New   Progress treatment and HEP as allowed per surgeon.   Issued formal HEP sleeper IR stretch  9/24 New   Return to work training as indicated   9/22 New         Assessment/Plan     ASSESSMENT:  Today we discussed his posture to improve the ROM in the L shoulder.  He tends to sit with a forward head and shoulders.  When I stabilize the joint when working on L shoulder flexion in supine he is able to take the arm back further.  He is compliant with his HEP, he has had more popping in the L shoulder in the past couple of months. He is not able to reach the R shoulder with the L hand as he feels something is blocking him in the R shoulder with this movement.  I  did work on STM to the L pec and upper arm, will see if this changed his pain.     PLAN: Continue to prioritize L GH mobility and progress with proximal strengthening, assess the effectiveness of using STM.     Signature: Mamta Martinez PTA, GABBY

## 2021-10-01 ENCOUNTER — TREATMENT (OUTPATIENT)
Dept: PHYSICAL THERAPY | Facility: CLINIC | Age: 51
End: 2021-10-01

## 2021-10-01 DIAGNOSIS — M25.512 CHRONIC LEFT SHOULDER PAIN: ICD-10-CM

## 2021-10-01 DIAGNOSIS — Z47.89 AFTERCARE FOLLOWING SURGERY OF THE MUSCULOSKELETAL SYSTEM: Primary | ICD-10-CM

## 2021-10-01 DIAGNOSIS — G89.29 CHRONIC LEFT SHOULDER PAIN: ICD-10-CM

## 2021-10-01 DIAGNOSIS — S46.012S TRAUMATIC COMPLETE TEAR OF LEFT ROTATOR CUFF, SEQUELA: ICD-10-CM

## 2021-10-01 PROCEDURE — 97110 THERAPEUTIC EXERCISES: CPT | Performed by: PHYSICAL THERAPIST

## 2021-10-01 PROCEDURE — 97140 MANUAL THERAPY 1/> REGIONS: CPT | Performed by: PHYSICAL THERAPIST

## 2021-10-01 NOTE — PROGRESS NOTES
Physical Therapy Treatment Note    Patient: Levi Hollingsworth                                                                                     Visit Date: 10/1/2021  :     1970    Referring practitioner:    Alex Hamilton MD  Date of Initial Visit:          Type: THERAPY  Noted: 2021    Patient seen for 5 sessions    Visit Diagnoses:    ICD-10-CM ICD-9-CM   1. Aftercare following surgery of the musculoskeletal system  Z47.89 V58.78   2. Traumatic complete tear of left rotator cuff, sequela  S46.012S 905.7   3. Chronic left shoulder pain  M25.512 719.41    G89.29 338.29     SUBJECTIVE     Subjective Pt reports that he is a little sore following soft tissue work last session. He has been working on adduction but has pain and what feels like a block.    PAIN: 2/10 L pec area and across the bicep muscle     OBJECTIVE     Objective      Manual Therapy     41816  Comments   L GH posterior/inferior mobilizations         Timed Minutes 10     Therapeutic Exercises    29962 Comments   L GH PROM into abd, flex, and ER Manually stabilized joint when stretching into flexion   HL Y with 3lb t-bar 2 x 10   L shoulder D1 PNF 2 x 10   Standing tows with YTB 2 x 10   Seated scapular depression/retraction 2 x 10       Timed Minutes 30     Therapy Education/Self Care 61102   Details: Horizontal abduction and adduction at home with broom stick, work on posture awareness and use a towel roll along spine when sitting in kitchen chair.   Given Home Exercise Program  [facility] [employer] HEP (access code [email or web address])   Progress: New and Reinforced   Education provided to:  Patient and demonstrtaed   Level of understanding Verbalized   Timed Minutes      Access Code: [email or web address]  URL: [email or web address]  Date: 2021  Prepared by: Shelby Rios    Exercises  Sleeper Stretch - 1-2 x daily - 7 x weekly - 3 reps - 30 sec hold      Total Timed Treatment:     40   mins  Total Time of Visit:             40   mins          ASSESSMENT/PLAN     GOALS  Goals                                          Progress Note due by 10/22/2021                                                      Recert due by 12/22/2021   STG by: 3 weeks Comments Date Status   In supine, he will have L shoulder flexion equal to R.  addressed with passive and active ROM 9/22 New   Will be able to passively get L hand to R shoulder with therapist assist  unable t o get through full range  9/30 New   AROM against gravity L shoulder flexion 100 or greater   9/22 New             LTG by: 6 weeks         B shoulder elevation nearly equal.   9/22 New   Strengthening tolerated once lifting restrictions cleared with minimal pain   9/22 New   Able to actively perform horizontal adduction and touch R shoulder with L hand   9/22 New   Progress treatment and HEP as allowed per surgeon.   Issued formal HEP sleeper IR stretch  9/24 New   Return to work training as indicated   9/22 New         Assessment/Plan     ASSESSMENT:  He continues to have the most difficulty with adduction, we addressed this along with postural strengthening today to assist in his range of motion. He dd have some popping with resisted rows in the shoulder.    PLAN: Continue to prioritize L GH mobility and progress with proximal and postural strengthening.    Signature: Jesus Alberto Zhang, PT DPT

## 2021-10-04 ENCOUNTER — TREATMENT (OUTPATIENT)
Dept: PHYSICAL THERAPY | Facility: CLINIC | Age: 51
End: 2021-10-04

## 2021-10-04 DIAGNOSIS — G89.29 CHRONIC LEFT SHOULDER PAIN: ICD-10-CM

## 2021-10-04 DIAGNOSIS — M25.512 CHRONIC LEFT SHOULDER PAIN: ICD-10-CM

## 2021-10-04 DIAGNOSIS — Z47.89 AFTERCARE FOLLOWING SURGERY OF THE MUSCULOSKELETAL SYSTEM: Primary | ICD-10-CM

## 2021-10-04 DIAGNOSIS — S46.012S TRAUMATIC COMPLETE TEAR OF LEFT ROTATOR CUFF, SEQUELA: ICD-10-CM

## 2021-10-04 PROCEDURE — 97140 MANUAL THERAPY 1/> REGIONS: CPT | Performed by: PHYSICAL THERAPIST

## 2021-10-04 PROCEDURE — 97110 THERAPEUTIC EXERCISES: CPT | Performed by: PHYSICAL THERAPIST

## 2021-10-04 NOTE — PROGRESS NOTES
Physical Therapy Treatment Note    Patient: Levi Hollingsworth                                                                                     Visit Date: 10/4/2021  :     1970    Referring practitioner:    Alex Hamilton MD  Date of Initial Visit:          Type: THERAPY  Noted: 2021    Patient seen for 6 sessions    Visit Diagnoses:    ICD-10-CM ICD-9-CM   1. Aftercare following surgery of the musculoskeletal system  Z47.89 V58.78   2. Traumatic complete tear of left rotator cuff, sequela  S46.012S 905.7   3. Chronic left shoulder pain  M25.512 719.41    G89.29 338.29     SUBJECTIVE     Subjective Pt reports that he did have increased soreness following our last session over the weekend. Friday and Saturday was bad so we may have over done it.    PAIN: 2/10 L pec area and across the bicep muscle     OBJECTIVE     Objective      Manual Therapy     31692  Comments   L GH posterior/inferior mobilizations     L deltoid, biceps, UT, LS STM Deep pressure with L UT   Timed Minutes 30     Therapeutic Exercises    69909 Comments   L GH PROM into abd, flex, and scaption Manually stabilized joint when stretching into flexion   L shoulder wall slides in flexion, abduction and scaption                    Timed Minutes 15     Therapy Education/Self Care 15029   Details:    Given Home Exercise Program  [employer] (access code [email or web address])   Progress: New and Reinforced   Education provided to:  Patient and demonstrtaed   Level of understanding Verbalized   Timed Minutes      Access Code: [email or web address]  URL: [email or web address]  Date: 2021  Prepared by: Shelby Rios    Exercises  Sleeper Stretch - 1-2 x daily - 7 x weekly - 3 reps - 30 sec hold      Total Timed Treatment:     45   mins  Total Time of Visit:             45   mins         ASSESSMENT/PLAN     GOALS  Goals                                          Progress Note due by 10/22/2021                                                      Recert due  by 12/22/2021   STG by: 3 weeks Comments Date Status   In supine, he will have L shoulder flexion equal to R.  addressed with passive and active ROM 9/22    Will be able to passively get L hand to R shoulder with therapist assist  unable to get through full range  9/30    AROM against gravity L shoulder flexion 100 or greater   9/22             LTG by: 6 weeks        B shoulder elevation nearly equal.  significant guarding in the L UT 10/4    Strengthening tolerated once lifting restrictions cleared with minimal pain   9/22    Able to actively perform horizontal adduction and touch R shoulder with L hand   9/22    Progress treatment and HEP as allowed per surgeon.   Issued formal HEP sleeper IR stretch  9/24    Return to work training as indicated   9/22          Assessment/Plan     ASSESSMENT:  He was very sore over the weekend following our last session so we decided to take it a little easier this date. He was very guarded in the L upper trap limiting his range motion especially with abduction. After addressing this with deep pressure during STM his range improved and he had less pain with movement.    PLAN: Assess his response to today's session. Look at upper trap guarding and address if needed. Continue to work on mobility and progress with proximal strengthening.    Signature: Jesus Alberto Zhang, PT DPT

## 2021-10-06 ENCOUNTER — TREATMENT (OUTPATIENT)
Dept: PHYSICAL THERAPY | Facility: CLINIC | Age: 51
End: 2021-10-06

## 2021-10-06 DIAGNOSIS — M25.512 CHRONIC LEFT SHOULDER PAIN: ICD-10-CM

## 2021-10-06 DIAGNOSIS — G89.29 CHRONIC LEFT SHOULDER PAIN: ICD-10-CM

## 2021-10-06 DIAGNOSIS — Z47.89 AFTERCARE FOLLOWING SURGERY OF THE MUSCULOSKELETAL SYSTEM: Primary | ICD-10-CM

## 2021-10-06 DIAGNOSIS — S46.012S TRAUMATIC COMPLETE TEAR OF LEFT ROTATOR CUFF, SEQUELA: ICD-10-CM

## 2021-10-06 PROCEDURE — 97140 MANUAL THERAPY 1/> REGIONS: CPT | Performed by: PHYSICAL THERAPIST

## 2021-10-06 PROCEDURE — 97032 APPL MODALITY 1+ESTIM EA 15: CPT | Performed by: PHYSICAL THERAPIST

## 2021-10-06 NOTE — PROGRESS NOTES
Labs entered   Physical Therapy Treatment Note    Patient: Levi Hollingsworth                                                                                     Visit Date: 10/6/2021  :     1970    Referring practitioner:    Alex Hamilton MD  Date of Initial Visit:          Type: THERAPY  Noted: 2021    Patient seen for 7 sessions    Visit Diagnoses:    ICD-10-CM ICD-9-CM   1. Aftercare following surgery of the musculoskeletal system  Z47.89 V58.78   2. Traumatic complete tear of left rotator cuff, sequela  S46.012S 905.7   3. Chronic left shoulder pain  M25.512 719.41    G89.29 338.29     SUBJECTIVE     Subjective He reports his shoulder hasn't been too bad but it has it's moments.    PAIN: 2/10         OBJECTIVE     Objective     Modalities Comments   Combo Cold Laser Estim Chronic Inflammation Mode L ACJ supine       Minutes 10     Manual Therapy     93815  Comments   Assessed B latissimus dorsi length x 2    STM L UT and LS In sitting propped on elevated table   STM L latissimus dorsi with b T bar R SL           Timed Minutes 30        Therapy Education/Self Care 60588   Details:    Given postural retraining   Progress: New   Education provided to:  Patient   Level of understanding Verbalized   Timed Minutes      Access Code: [email or web address]  URL: [email or web address]  Date: 2021  Prepared by: Shelby Rios     Exercises  Sleeper Stretch - 1-2 x daily - 7 x weekly - 3 reps - 30 sec hold    Total Timed Treatment:     40   mins  Total Time of Visit:             40   mins         ASSESSMENT/PLAN     GOALS  Goals                                          Progress Note due by 10/22/2021                                                      Recert due by 2021   STG by: 3 weeks Comments Date Status   In supine, he will have L shoulder flexion equal to R.  addressed with passive and active ROM      Will be able to passively get L hand to R shoulder with therapist assist  unable to get through full range   9/30     AROM against gravity L shoulder flexion 100 or greater   9/22               LTG by: 6 weeks         B shoulder elevation nearly equal.  significant guarding in the L UT 10/4     Strengthening tolerated once lifting restrictions cleared with minimal pain   9/22     Able to actively perform horizontal adduction and touch R shoulder with L hand   9/22     Progress treatment and HEP as allowed per surgeon.   Issued formal HEP sleeper IR stretch  9/24     Return to work training as indicated   9/22           Assessment/Plan     ASSESSMENT: Initially his L shoulder AROM flexion was very restricted and his L latissimus dorsi length was decreased which would increase his pain and UT recruitment. This reduced with intervention today but we will need to address this again during the next session. In addition, today was the first session we utilized the [employer] Laser and we will continue to utilize this as well. Unfortunately we did not have an opportunity to address his restricted horizontal adduction restriction but we can do this soon in an upcoming session but it isn't pressing due to it's a lesser functional motion as compared to other motions.     PLAN: See assessment section.    Signature: Stef Johnson, PTA

## 2021-10-08 ENCOUNTER — TREATMENT (OUTPATIENT)
Dept: PHYSICAL THERAPY | Facility: CLINIC | Age: 51
End: 2021-10-08

## 2021-10-08 DIAGNOSIS — S46.012S TRAUMATIC COMPLETE TEAR OF LEFT ROTATOR CUFF, SEQUELA: ICD-10-CM

## 2021-10-08 DIAGNOSIS — M25.512 CHRONIC LEFT SHOULDER PAIN: ICD-10-CM

## 2021-10-08 DIAGNOSIS — G89.29 CHRONIC LEFT SHOULDER PAIN: ICD-10-CM

## 2021-10-08 DIAGNOSIS — Z47.89 AFTERCARE FOLLOWING SURGERY OF THE MUSCULOSKELETAL SYSTEM: Primary | ICD-10-CM

## 2021-10-08 PROCEDURE — 97032 APPL MODALITY 1+ESTIM EA 15: CPT | Performed by: PHYSICAL THERAPIST

## 2021-10-08 PROCEDURE — 97140 MANUAL THERAPY 1/> REGIONS: CPT | Performed by: PHYSICAL THERAPIST

## 2021-10-08 PROCEDURE — 97110 THERAPEUTIC EXERCISES: CPT | Performed by: PHYSICAL THERAPIST

## 2021-10-08 NOTE — PROGRESS NOTES
Physical Therapy Treatment Note    Patient: Levi Hollingsworth                                                                                     Visit Date: 10/8/2021  :     1970    Referring practitioner:    Alex Hamilton MD  Date of Initial Visit:          Type: THERAPY  Noted: 2021    Patient seen for 8 sessions    Visit Diagnoses:    ICD-10-CM ICD-9-CM   1. Aftercare following surgery of the musculoskeletal system  Z47.89 V58.78   2. Traumatic complete tear of left rotator cuff, sequela  S46.012S 905.7   3. Chronic left shoulder pain  M25.512 719.41    G89.29 338.29     SUBJECTIVE     Subjective He reports a little shoulder soreness but not to bad.    PAIN: 1/10         OBJECTIVE     Objective      Modalities Comments   Combo Cold Laser Estim Chronic Inflammation Mode L ACJ supine       Minutes 10     Manual Therapy     02817  Comments   Assessed B latissimus dorsi length x 2    STM L latissimus dorsi with blue T bar L SL   STM L bicep supine   L GHJ lateral and inferior glides Grade 2         Timed Minutes 35        Therapeutic Exercises    48019 Comments   Supine L serratus punches with #2 x 20    Standing CW/CCW with green ball against wall x 1 min each x 2 sets                 Timed Minutes 10       Therapy Education/Self Care 20873   Details:    Given postural retraining  symptom relief   Progress: New   Education provided to:  Patient   Level of understanding Verbalized   Timed Minutes        Access Code: [email or web address]  URL: [email or web address]  Date: 2021  Prepared by: Shelby Rios     Exercises  Sleeper Stretch - 1-2 x daily - 7 x weekly - 3 reps - 30 sec hold     Total Timed Treatment:     55   mins  Total Time of Visit:             55   mins         ASSESSMENT/PLAN     GOALS    Goals                                          Progress Note due by 10/22/2021                                                      Recert due by 2021   STG by: 3 weeks Comments Date Status   In  supine, he will have L shoulder flexion equal to R.  addressed with passive and active ROM 9/22     Will be able to passively get L hand to R shoulder with therapist assist  unable to get through full range  9/30     AROM against gravity L shoulder flexion 100 or greater  110 degrees AROM post STM 10/8/21  Met             LTG by: 6 weeks         B shoulder elevation nearly equal.  significant guarding in the L UT 10/4     Strengthening tolerated once lifting restrictions cleared with minimal pain   9/22     Able to actively perform horizontal adduction and touch R shoulder with L hand   9/22     Progress treatment and HEP as allowed per surgeon.   Issued formal HEP sleeper IR stretch  9/24     Return to work training as indicated   9/22         Assessment/Plan     ASSESSMENT: Today he met his goal for AROM shoulder flexion against gravity but it is still asymmetrical in comparison to the uninvolved side. In addition, he was able to perform horizontal adduction with improved AROM and pain free post mobilizations today as well. His L bicep presented with significant soft tissue restrictions today and we will need to address this multiple times going forward.     PLAN: Consider utilizing IASTM to the L bicep, continue L GHJ mobilizations, and follow those up with strengthening. Also consider utilizing taping techniques to support the shoulder and promote proper movement pattern.    Signature: Stef Johnson, PTA

## 2021-10-11 ENCOUNTER — TREATMENT (OUTPATIENT)
Dept: PHYSICAL THERAPY | Facility: CLINIC | Age: 51
End: 2021-10-11

## 2021-10-11 DIAGNOSIS — S46.012S TRAUMATIC COMPLETE TEAR OF LEFT ROTATOR CUFF, SEQUELA: ICD-10-CM

## 2021-10-11 DIAGNOSIS — M25.512 CHRONIC LEFT SHOULDER PAIN: ICD-10-CM

## 2021-10-11 DIAGNOSIS — Z47.89 AFTERCARE FOLLOWING SURGERY OF THE MUSCULOSKELETAL SYSTEM: Primary | ICD-10-CM

## 2021-10-11 DIAGNOSIS — G89.29 CHRONIC LEFT SHOULDER PAIN: ICD-10-CM

## 2021-10-11 PROCEDURE — 97140 MANUAL THERAPY 1/> REGIONS: CPT | Performed by: PHYSICAL THERAPIST

## 2021-10-11 PROCEDURE — 97110 THERAPEUTIC EXERCISES: CPT | Performed by: PHYSICAL THERAPIST

## 2021-10-11 NOTE — PROGRESS NOTES
"Physical Therapy Treatment Note    Patient: Levi Hollingsworth                                                                                     Visit Date: 10/11/2021  :     1970    Referring practitioner:    Alex Hamilton MD  Date of Initial Visit:          Type: THERAPY  Noted: 2021    Patient seen for 9 sessions    Visit Diagnoses:    ICD-10-CM ICD-9-CM   1. Aftercare following surgery of the musculoskeletal system  Z47.89 V58.78   2. Traumatic complete tear of left rotator cuff, sequela  S46.012S 905.7   3. Chronic left shoulder pain  M25.512 719.41    G89.29 338.29     SUBJECTIVE     Subjective Pt reports that he is \"decent\" this morning. He can tell the weather is fixing to change due to pain in the shoulder.    PAIN: 2/10         OBJECTIVE     Objective      Manual Therapy     89853  Comments   STM L bicep and UT supine   L GHJ lateral and inferior glides Grade 2         Timed Minutes 15        Therapeutic Exercises    22495 Comments   PROM L shoulder in flexion, abduction, IR/ER    Supine L shoulder anti-rotations with YTB 3 x 30 sec   Supine horizontal abduction with YTB 2 x 10   L shoulder isometrics with towel on wall  1 x 10 each range           Timed Minutes 30       Therapy Education/Self Care 16245   Details: L shoulder isometrics in flexion/extension, IR/ER on wall    Given postural retraining  symptom relief   Progress: New   Education provided to:  Patient   Level of understanding Verbalized   Timed Minutes        Access Code: [email or web address]  URL: [email or web address]  Date: 2021  Prepared by: Shelby Rios     Exercises  Sleeper Stretch - 1-2 x daily - 7 x weekly - 3 reps - 30 sec hold     Total Timed Treatment:     45   mins  Total Time of Visit:             45   mins         ASSESSMENT/PLAN     GOALS    Goals                                          Progress Note due by 10/22/2021                                                      Recert due by 2021   STG by: 3 " weeks Comments Date Status   In supine, he will have L shoulder flexion equal to R. 158 deg in supine 10/11  ongoing   Will be able to passively get L hand to R shoulder with therapist assist  unable to get through full range  9/30     AROM against gravity L shoulder flexion 100 or greater  110 degrees AROM post STM 10/8/21  Met             LTG by: 6 weeks         B shoulder elevation nearly equal.  significant guarding in the L UT 10/4     Strengthening tolerated once lifting restrictions cleared with minimal pain   9/22     Able to actively perform horizontal adduction and touch R shoulder with L hand   9/22     Progress treatment and HEP as allowed per surgeon.   Issued formal HEP sleeper IR stretch  9/24     Return to work training as indicated   9/22         Assessment/Plan     ASSESSMENT: He was less guarded this date in the upper trap, lats and bicep allowing for greater PROM. His AROM has improved significantly since initial eval in which passive was documented and he has surpassed that range actively. He continues to have difficulty with range against gravity due to weakness. ROM measurements were obtained in the supine position. We did progress with strengthening this date in which he tolerated well, we will continue to work on the proximal musculature to achieve greater mobility against gravity.    PLAN: Address soft tissue restrictions as needed, continue to work on ROM and progress with strengthening.    Signature: Jesus Alberto Zhang, PT DPT

## 2021-10-13 ENCOUNTER — TREATMENT (OUTPATIENT)
Dept: PHYSICAL THERAPY | Facility: CLINIC | Age: 51
End: 2021-10-13

## 2021-10-13 DIAGNOSIS — Z47.89 AFTERCARE FOLLOWING SURGERY OF THE MUSCULOSKELETAL SYSTEM: Primary | ICD-10-CM

## 2021-10-13 DIAGNOSIS — G89.29 CHRONIC LEFT SHOULDER PAIN: ICD-10-CM

## 2021-10-13 DIAGNOSIS — M25.512 CHRONIC LEFT SHOULDER PAIN: ICD-10-CM

## 2021-10-13 DIAGNOSIS — S46.012S TRAUMATIC COMPLETE TEAR OF LEFT ROTATOR CUFF, SEQUELA: ICD-10-CM

## 2021-10-13 PROCEDURE — 97140 MANUAL THERAPY 1/> REGIONS: CPT | Performed by: PHYSICAL THERAPIST

## 2021-10-13 PROCEDURE — 97110 THERAPEUTIC EXERCISES: CPT | Performed by: PHYSICAL THERAPIST

## 2021-10-13 NOTE — PROGRESS NOTES
Physical Therapy Treatment Note    Patient: Levi Hollingsworth                                                                                     Visit Date: 10/13/2021  :     1970    Referring practitioner:    Alex Hamilton MD  Date of Initial Visit:          Type: THERAPY  Noted: 2021    Patient seen for 10 sessions    Visit Diagnoses:    ICD-10-CM ICD-9-CM   1. Aftercare following surgery of the musculoskeletal system  Z47.89 V58.78   2. Traumatic complete tear of left rotator cuff, sequela  S46.012S 905.7   3. Chronic left shoulder pain  M25.512 719.41    G89.29 338.29     SUBJECTIVE     Subjective Pt reports increased pain in the front of the L shoulder this date for some reason. He can not attribute this to anything new or different that he did.    PAIN: 2/10         OBJECTIVE     Objective      Manual Therapy     [id number]  Comments   STM L bicep and UT supine   L GHJ lateral and inferior glides Grade 2         Timed Minutes 15        Therapeutic Exercises    [id number] Comments   PROM L shoulder in flexion, abduction, IR/ER, horizontal adduction     Shoulder pulley in flexion and abduction  2 min each   SA wall slides  2 x 10   Wall slides with orange ball  1 x 15   Standing Y against wall         Timed Minutes 30       Therapy Education/Self Care 49642   Details:    Given postural retraining  symptom relief   Progress: Reinforced   Education provided to:  Patient   Level of understanding Verbalized   Timed Minutes        Access Code: [email or web address]  URL: [email or web address]  Date: 2021  Prepared by: Shelby De Leon  Sleeper Stretch - 1-2 x daily - 7 x weekly - 3 reps - 30 sec hold     Total Timed Treatment:     45   mins  Total Time of Visit:             45   mins         ASSESSMENT/PLAN     GOALS    Goals                                          Progress Note due by 10/22/2021                                                      Recert due by 2021   STG by: 3 weeks Comments  Date Status   In supine, he will have L shoulder flexion equal to R. 158 deg in supine 10/11  ongoing   Will be able to passively get L hand to R shoulder with therapist assist Pain with adduction  10/13     AROM against gravity L shoulder flexion 100 or greater  110 degrees AROM post STM 10/8/21  Met             LTG by: 6 weeks         B shoulder elevation nearly equal.  significant guarding in the L UT 10/4     Strengthening tolerated once lifting restrictions cleared with minimal pain   9/22     Able to actively perform horizontal adduction and touch R shoulder with L hand   9/22     Progress treatment and HEP as allowed per surgeon.   Issued formal HEP sleeper IR stretch  9/24     Return to work training as indicated   9/22         Assessment/Plan     ASSESSMENT: His ROM is improving however he still has the most difficulty with flexion against gravity and adduction. He tends to utilize the upper trap to elevate the L shoulder. With cues this did improve and he was able to elevate higher and with less pain however it was still present.    PLAN: Continue to work on his ROM, progress with strengthening and work in flexion and adduction as tolerated.    Signature: Jesus Alberto Zhang, PT DPT

## 2021-10-15 ENCOUNTER — TREATMENT (OUTPATIENT)
Dept: PHYSICAL THERAPY | Facility: CLINIC | Age: 51
End: 2021-10-15

## 2021-10-15 DIAGNOSIS — Z47.89 AFTERCARE FOLLOWING SURGERY OF THE MUSCULOSKELETAL SYSTEM: Primary | ICD-10-CM

## 2021-10-15 DIAGNOSIS — S46.012S TRAUMATIC COMPLETE TEAR OF LEFT ROTATOR CUFF, SEQUELA: ICD-10-CM

## 2021-10-15 DIAGNOSIS — M25.512 CHRONIC LEFT SHOULDER PAIN: ICD-10-CM

## 2021-10-15 DIAGNOSIS — G89.29 CHRONIC LEFT SHOULDER PAIN: ICD-10-CM

## 2021-10-15 PROCEDURE — 97140 MANUAL THERAPY 1/> REGIONS: CPT | Performed by: PHYSICAL THERAPIST

## 2021-10-15 PROCEDURE — 97110 THERAPEUTIC EXERCISES: CPT | Performed by: PHYSICAL THERAPIST

## 2021-10-15 NOTE — PROGRESS NOTES
Physical Therapy Treatment Note    Patient: Levi Hollingsworth                                                                                     Visit Date: 10/15/2021  :     1970    Referring practitioner:    Alex Hamilton MD  Date of Initial Visit:          Type: THERAPY  Noted: 2021    Patient seen for 11 sessions    Visit Diagnoses:    ICD-10-CM ICD-9-CM   1. Aftercare following surgery of the musculoskeletal system  Z47.89 V58.78   2. Traumatic complete tear of left rotator cuff, sequela  S46.012S 905.7   3. Chronic left shoulder pain  M25.512 719.41    G89.29 338.29     SUBJECTIVE     Subjective Pt reports that he was in some pain the day after his last session but had a pretty good day yesterday. His pain today has increased in the front of the arm.    PAIN: 3/10         OBJECTIVE     Objective      Manual Therapy     [id number]  Comments   STM L bicep, deltoid, UT and pec major supine   L GHJ lateral and inferior glides Grade 2         Timed Minutes 15        Therapeutic Exercises    61255 Comments   PROM L shoulder in flexion, abduction, IR/ER, horizontal adduction     AAROM in flexion, abduction and scaption with 3lb wand    HL Y with 3 lb wand 2 x 10   L shoulder flexion walk outs with TRX strap 2 x 10   L shoulder extension walk outs with TRX strap 2 x 10   SA wall slides with pink foam roller 2 x 10           Timed Minutes 30       Therapy Education/Self Care 09904   Details:    Given postural retraining  symptom relief   Progress: Reinforced   Education provided to:  Patient   Level of understanding Verbalized   Timed Minutes        Access Code: [email or web address]  URL: [email or web address]  Date: 2021  Prepared by: Shelby De Leon  Sleeper Stretch - 1-2 x daily - 7 x weekly - 3 reps - 30 sec hold     Total Timed Treatment:     45   mins  Total Time of Visit:             45   mins         ASSESSMENT/PLAN     GOALS    Goals                                          Progress  Note due by 10/22/2021                                                      Recert due by 12/22/2021   STG by: 3 weeks Comments Date Status   In supine, he will have L shoulder flexion equal to R. 158 deg in supine 10/11  ongoing   Will be able to passively get L hand to R shoulder with therapist assist Pain with abduction and more guarded this date 10/15  ongoing   AROM against gravity L shoulder flexion 100 or greater  110 degrees AROM post STM 10/8/21  Met             LTG by: 6 weeks         B shoulder elevation nearly equal.  significant guarding in the L UT 10/4     Strengthening tolerated once lifting restrictions cleared with minimal pain   9/22     Able to actively perform horizontal adduction and touch R shoulder with L hand   9/22     Progress treatment and HEP as allowed per surgeon.   Issued formal HEP sleeper IR stretch  9/24     Return to work training as indicated   9/22         Assessment/Plan     ASSESSMENT: He was more guarded in the deltoid and biceps this date, also found there was guarding and tightness in the pec major. He did get a good stretch with walk out on the TRX straps and this did seem to help improve his mobility.    PLAN: Address soft tissue restrictions including deltoid and pec muscle.    Signature: Jesus Alberto Zhang, PT DPT

## 2021-10-20 ENCOUNTER — TREATMENT (OUTPATIENT)
Dept: PHYSICAL THERAPY | Facility: CLINIC | Age: 51
End: 2021-10-20

## 2021-10-20 DIAGNOSIS — M25.512 CHRONIC LEFT SHOULDER PAIN: ICD-10-CM

## 2021-10-20 DIAGNOSIS — S46.012S TRAUMATIC COMPLETE TEAR OF LEFT ROTATOR CUFF, SEQUELA: ICD-10-CM

## 2021-10-20 DIAGNOSIS — G89.29 CHRONIC LEFT SHOULDER PAIN: ICD-10-CM

## 2021-10-20 DIAGNOSIS — Z47.89 AFTERCARE FOLLOWING SURGERY OF THE MUSCULOSKELETAL SYSTEM: Primary | ICD-10-CM

## 2021-10-20 PROCEDURE — 97140 MANUAL THERAPY 1/> REGIONS: CPT | Performed by: PHYSICAL THERAPIST

## 2021-10-20 PROCEDURE — 97110 THERAPEUTIC EXERCISES: CPT | Performed by: PHYSICAL THERAPIST

## 2021-10-20 NOTE — PROGRESS NOTES
Physical Therapy Treatment Note    Patient: Levi Hollingsworth                                                                                     Visit Date: 10/20/2021  :     1970    Referring practitioner:    Alex Hamilton MD  Date of Initial Visit:          Type: THERAPY  Noted: 2021    Patient seen for 12 sessions    Visit Diagnoses:    ICD-10-CM ICD-9-CM   1. Aftercare following surgery of the musculoskeletal system  Z47.89 V58.78   2. Traumatic complete tear of left rotator cuff, sequela  S46.012S 905.7   3. Chronic left shoulder pain  M25.512 719.41    G89.29 338.29     SUBJECTIVE     Subjective No changes since last visit    PAIN: 3/10         OBJECTIVE     Objective      Manual Therapy     40245  Comments   STM L bicep, deltoid, UT and pec major supine   L GHJ lateral and inferior glides Grade 2         Timed Minutes 15        Therapeutic Exercises    06428 Comments   PROM L shoulder in flexion, abduction, IR/ER, horizontal adduction     HL shoulder flexion with YTB    L shoulder scaption walk outs with TRX straps 10   L shoulder flexion walk outs with TRX strap 10   L shoulder extension walk outs with TRX strap 10   Rows with rip   2 x 10   Seated scapular depression/retraction on cybex lvl 2 2 x 10   Arm ergometer  2 min fwd, 2 min bwd   Timed Minutes 30       Therapy Education/Self Care 28560   Details:    Given postural retraining  symptom relief   Progress: Reinforced   Education provided to:  Patient   Level of understanding Verbalized   Timed Minutes        Access Code: [email or web address]  URL: [email or web address]  Date: 2021  Prepared by: Shelby Rios     Exercises  Sleeper Stretch - 1-2 x daily - 7 x weekly - 3 reps - 30 sec hold     Total Timed Treatment:     45   mins  Total Time of Visit:             45   mins         ASSESSMENT/PLAN     GOALS    Goals                                          Progress Note due by  10/22/2021                                                      Recert due by 12/22/2021   STG by: 3 weeks Comments Date Status   In supine, he will have L shoulder flexion equal to R. 158 deg in supine 10/11  ongoing   Will be able to passively get L hand to R shoulder with therapist assist Pain with abduction and more guarded this date 10/15  ongoing   AROM against gravity L shoulder flexion 100 or greater  110 degrees AROM post STM 10/8/21  Met             LTG by: 6 weeks         B shoulder elevation nearly equal.  significant guarding in the L UT 10/4     Strengthening tolerated once lifting restrictions cleared with minimal pain   9/22     Able to actively perform horizontal adduction and touch R shoulder with L hand   9/22     Progress treatment and HEP as allowed per surgeon.   Issued formal HEP sleeper IR stretch  9/24     Return to work training as indicated   9/22         Assessment/Plan     ASSESSMENT: With resistive activity is when he notices pain in the L arm. It is suspected that this is the biceps that is detatched that is the source of the pain. His range is improving but we still have to be conscious of mechanics and angles of pull with activities.      PLAN: Continue to work on mobility and progress with strengthening as tolerated.    Signature: Jesus Alberto Zhang, PT DPT

## 2021-10-22 ENCOUNTER — TREATMENT (OUTPATIENT)
Dept: PHYSICAL THERAPY | Facility: CLINIC | Age: 51
End: 2021-10-22

## 2021-10-22 DIAGNOSIS — Z47.89 AFTERCARE FOLLOWING SURGERY OF THE MUSCULOSKELETAL SYSTEM: Primary | ICD-10-CM

## 2021-10-22 DIAGNOSIS — M25.512 CHRONIC LEFT SHOULDER PAIN: ICD-10-CM

## 2021-10-22 DIAGNOSIS — S46.012S TRAUMATIC COMPLETE TEAR OF LEFT ROTATOR CUFF, SEQUELA: ICD-10-CM

## 2021-10-22 DIAGNOSIS — G89.29 CHRONIC LEFT SHOULDER PAIN: ICD-10-CM

## 2021-10-22 PROCEDURE — 97110 THERAPEUTIC EXERCISES: CPT | Performed by: PHYSICAL THERAPIST

## 2021-10-22 PROCEDURE — 97140 MANUAL THERAPY 1/> REGIONS: CPT | Performed by: PHYSICAL THERAPIST

## 2021-10-22 NOTE — PROGRESS NOTES
Physical Therapy 30 Day Progress Note    Patient: Levi Hollingsworth                                                                                     Visit Date: 10/22/2021  :     1970    Referring practitioner:    Alex Hamilton MD  Date of Initial Visit:          Type: THERAPY  Noted: 2021    Patient seen for 13 sessions    Visit Diagnoses:    ICD-10-CM ICD-9-CM   1. Aftercare following surgery of the musculoskeletal system  Z47.89 V58.78   2. Traumatic complete tear of left rotator cuff, sequela  S46.012S 905.7   3. Chronic left shoulder pain  M25.512 719.41    G89.29 338.29     SUBJECTIVE     Subjective Pt reports that he had a little soreness after our last session, he is doing pretty good this afternoon.    PAIN: 1/10         OBJECTIVE     Objective      Manual Therapy     [id number]  Comments   STM L bicep, deltoid, UT and pec major supine   L GHJ lateral and inferior glides Grade 2         Timed Minutes 15        Therapeutic Exercises    73477 Comments   PROM L shoulder in flexion, abduction, IR/ER, horizontal adduction     HL shoulder flexion with RTB 2 x 10   Horizontal shoulder abduction with RTB 2 x 10   D2 on cybex lvl 2 2 x 10   D1 on cybex lvl 1 2 x 10   Low row on cybex lvl 2 2 x 10           Timed Minutes 30       Therapy Education/Self Care 03748   Details:    Given postural retraining  symptom relief   Progress: Reinforced   Education provided to:  Patient   Level of understanding Verbalized   Timed Minutes        Access Code: [email or web address]  URL: [email or web address]  Date: 2021  Prepared by: Shelby Rios     Exercises  Sleeper Stretch - 1-2 x daily - 7 x weekly - 3 reps - 30 sec hold     Total Timed Treatment:     45   mins  Total Time of Visit:             45   mins         ASSESSMENT/PLAN     GOALS    Goals                                          Progress Note due by 2021                                                      Recert due by 2021   STG by: 3 weeks  Comments Date Status   In supine, he will have L shoulder flexion equal to R. R 177  L 158 10/22  ongoing   Will be able to passively get L hand to R shoulder with therapist assist Able to reach front of shoulder during adduction but has pain 10/22  ongoing   AROM against gravity L shoulder flexion 100 or greater  110 degrees AROM post STM 10/8/21  Met             LTG by: 6 weeks         B shoulder elevation nearly equal.  decreased on the L compared to the L 10/22  ongoing   Strengthening tolerated once lifting restrictions cleared with minimal pain  we have been progressing with strengthening activities while staying under his 10-15 lb limit 10/22  ongoing   Able to actively perform horizontal adduction and touch R shoulder with L hand  can touch with finger tips but has increased pain 10/22  ongoing   Progress treatment and HEP as allowed per surgeon.  compliant and consistent with his HEP 10/22  ongoing   Return to work training as indicated  unable to return to work at this time 10/22  ongoing       Assessment & Plan     Assessment  Impairments: abnormal or restricted ROM, activity intolerance, impaired physical strength, lacks appropriate home exercise program and pain with function  Barriers to therapy: none  Prognosis: good  Functional Limitations: carrying objects, lifting, pulling, pushing, uncomfortable because of pain, reaching behind back and reaching overhead  Plan  Therapy options: will be seen for skilled physical therapy services  Planned modality interventions: low level laser therapy, high voltage pulsed current (pain management) and dry needling  Planned therapy interventions: manual therapy, neuromuscular re-education, soft tissue mobilization, spinal/joint mobilization, strengthening, stretching, therapeutic activities, transfer training, joint mobilization, home exercise program, functional ROM exercises and flexibility  Frequency: 3x week (2-3 X/wk)  Duration in weeks: 6  Treatment plan  discussed with: patient         ASSESSMENT: He is progressing with therapy and his range of motion and strength is improving. His primary limitation is with flexion against gravity along with adduction. We progressed with strengthening this date and he tolerated this well with only minimal increase in pain. Skilled physical therapy is indicated to continue to focus on these areas and improve his functional mobility.    PLAN: We will continue to work on his ROM and strengthening specifically in flexion against gravity along with abduction and adduction.    Signature: Jesus Alberto Zhang PT DPT

## 2021-10-25 ENCOUNTER — TREATMENT (OUTPATIENT)
Dept: PHYSICAL THERAPY | Facility: CLINIC | Age: 51
End: 2021-10-25

## 2021-10-25 DIAGNOSIS — S46.012S TRAUMATIC COMPLETE TEAR OF LEFT ROTATOR CUFF, SEQUELA: ICD-10-CM

## 2021-10-25 DIAGNOSIS — Z47.89 AFTERCARE FOLLOWING SURGERY OF THE MUSCULOSKELETAL SYSTEM: Primary | ICD-10-CM

## 2021-10-25 DIAGNOSIS — G89.29 CHRONIC LEFT SHOULDER PAIN: ICD-10-CM

## 2021-10-25 DIAGNOSIS — M25.512 CHRONIC LEFT SHOULDER PAIN: ICD-10-CM

## 2021-10-25 PROCEDURE — 97140 MANUAL THERAPY 1/> REGIONS: CPT | Performed by: PHYSICAL THERAPIST

## 2021-10-25 PROCEDURE — 97110 THERAPEUTIC EXERCISES: CPT | Performed by: PHYSICAL THERAPIST

## 2021-10-25 NOTE — PROGRESS NOTES
Physical Therapy 30 Day Progress Note    Patient: Levi Hollingsworth                                                                                     Visit Date: 10/25/2021  :     1970    Referring practitioner:    Alex Hamilton MD  Date of Initial Visit:          Type: THERAPY  Noted: 2021    Patient seen for 14 sessions    Visit Diagnoses:    ICD-10-CM ICD-9-CM   1. Aftercare following surgery of the musculoskeletal system  Z47.89 V58.78   2. Traumatic complete tear of left rotator cuff, sequela  S46.012S 905.7   3. Chronic left shoulder pain  M25.512 719.41    G89.29 338.29     SUBJECTIVE     Subjective The shoulder is doing pretty well today, he was not as sore a she thought he was.    PAIN: 1/10         OBJECTIVE     Objective      Manual Therapy     [id number]  Comments   STM L bicep, deltoid, UT and pec major supine   L GHJ lateral and inferior glides Grade 2         Timed Minutes 15        Therapeutic Exercises    19255 Comments   PROM L shoulder in flexion, abduction, IR/ER, horizontal adduction     Walk outs with TRX in flexion, scaption and extension     Supine shoulder flexion to 90 deg body blade IR/ER 3 x 30 sec   Supine shoulder flexion to 90 deg body blade flexion/extension  3 x 30 sec   Supine in scaption with body blade  3 x 30 sec   Hammer curls with 3lb weight  3 x 10           Timed Minutes 30       Therapy Education/Self Care 05481   Details:    Given postural retraining  symptom relief   Progress: Reinforced   Education provided to:  Patient   Level of understanding Verbalized   Timed Minutes        Access Code: [email or web address]  URL: [email or web address]  Date: 2021  Prepared by: Shelby Rios     Exercises  Sleeper Stretch - 1-2 x daily - 7 x weekly - 3 reps - 30 sec hold     Total Timed Treatment:     45   mins  Total Time of Visit:             45   mins         ASSESSMENT/PLAN     GOALS    Goals                                          Progress Note due by  11/21/2021                                                      Recert due by 12/22/2021   STG by: 3 weeks Comments Date Status   In supine, he will have L shoulder flexion equal to R. R 177  L 158 10/22  ongoing   Will be able to passively get L hand to R shoulder with therapist assist Able to reach front of shoulder during adduction but has pain 10/22  ongoing   AROM against gravity L shoulder flexion 100 or greater  110 degrees AROM post STM 10/8/21  Met             LTG by: 6 weeks         B shoulder elevation nearly equal.  decreased on the L compared to the L 10/22  ongoing   Strengthening tolerated once lifting restrictions cleared with minimal pain  we have been progressing with strengthening activities while staying under his 10-15 lb limit 10/22  ongoing   Able to actively perform horizontal adduction and touch R shoulder with L hand  can touch with finger tips but has increased pain 10/22  ongoing   Progress treatment and HEP as allowed per surgeon.  compliant and consistent with his HEP 10/22  ongoing   Return to work training as indicated  unable to return to work at this time 10/22  ongoing       Assessment & Plan            ASSESSMENT: We utilized the body blade for the first time today, he had trouble initiating movement and maintaining it demonstrating fatigue in the rotator cuff muscles. He was able to complete hammer curls but noted fatigue there as well.    PLAN: Continue to work on ROM and progress with strengthening and endurance tasks.    Signature: Jesus Alberto Zhang, PT DPT

## 2021-10-27 ENCOUNTER — TREATMENT (OUTPATIENT)
Dept: PHYSICAL THERAPY | Facility: CLINIC | Age: 51
End: 2021-10-27

## 2021-10-27 DIAGNOSIS — M25.512 CHRONIC LEFT SHOULDER PAIN: ICD-10-CM

## 2021-10-27 DIAGNOSIS — G89.29 CHRONIC LEFT SHOULDER PAIN: ICD-10-CM

## 2021-10-27 DIAGNOSIS — Z47.89 AFTERCARE FOLLOWING SURGERY OF THE MUSCULOSKELETAL SYSTEM: Primary | ICD-10-CM

## 2021-10-27 DIAGNOSIS — S46.012S TRAUMATIC COMPLETE TEAR OF LEFT ROTATOR CUFF, SEQUELA: ICD-10-CM

## 2021-10-27 PROCEDURE — 97110 THERAPEUTIC EXERCISES: CPT | Performed by: PHYSICAL THERAPIST

## 2021-10-27 NOTE — PROGRESS NOTES
Physical Therapy 30 Day Progress Note    Patient: Levi Hollingsworth                                                                                     Visit Date: 10/27/2021  :     1970    Referring practitioner:    Alex Hamilton MD  Date of Initial Visit:          Type: THERAPY  Noted: 2021    Patient seen for 15 sessions    Visit Diagnoses:    ICD-10-CM ICD-9-CM   1. Aftercare following surgery of the musculoskeletal system  Z47.89 V58.78   2. Traumatic complete tear of left rotator cuff, sequela  S46.012S 905.7   3. Chronic left shoulder pain  M25.512 719.41    G89.29 338.29     SUBJECTIVE     Subjective No changes since last session feeling about the same as usual.    PAIN: 1/10         OBJECTIVE     Objective         Therapeutic Exercises    32665 Comments   PROM L shoulder in flexion, abduction, IR/ER, horizontal adduction     Arm ergometer 3 min fwd, 3 min bwd    L shoulder diagonals with 6lb weight  2 x 10   Supine shoulder flexion with 6lb weight 2 x 10   Supine SA punches with 6lb weight 2 x 10   Supine shoulder flexion/IR/ER and scpation with bodyblade  3 x 30 sec   Horizontal adduction/abduction in standing 2 x 10   SA wall slides with foam roller 2 x 10   Timed Minutes 45       Therapy Education/Self Care 59531   Details:    Given postural retraining  symptom relief   Progress: Reinforced   Education provided to:  Patient   Level of understanding Verbalized   Timed Minutes        Access Code: [email or web address]  URL: [email or web address]  Date: 2021  Prepared by: Shelby Rios     Exercises  Sleeper Stretch - 1-2 x daily - 7 x weekly - 3 reps - 30 sec hold     Total Timed Treatment:     45   mins  Total Time of Visit:             45   mins         ASSESSMENT/PLAN     GOALS    Goals                                          Progress Note due by 2021                                                      Recert due by 2021   STG by: 3 weeks Comments Date Status   In supine, he  will have L shoulder flexion equal to R. R 177  L 158 10/22  ongoing   Will be able to passively get L hand to R shoulder with therapist assist Able to reach front of shoulder during adduction but has pain 10/22  ongoing   AROM against gravity L shoulder flexion 100 or greater  110 degrees AROM post STM 10/8/21  Met             LTG by: 6 weeks         B shoulder elevation nearly equal.  decreased on the L compared to the L 10/22  ongoing   Strengthening tolerated once lifting restrictions cleared with minimal pain  we have been progressing with strengthening activities while staying under his 10-15 lb limit 10/22  ongoing   Able to actively perform horizontal adduction and touch R shoulder with L hand  can touch with finger tips but has increased pain 10/22  ongoing   Progress treatment and HEP as allowed per surgeon.  compliant and consistent with his HEP 10/22  ongoing   Return to work training as indicated  unable to return to work at this time 10/22  ongoing       Assessment/Plan     ASSESSMENT: His range was the best it has been thus far today. We worked on strengthening with gravity eliminated and he could really tell the proximal muscle getting a workout. He did much better with the bodyblade today able to keep a rhythmic pattern in flexion and IR/ER, he still had a difficult time in scaption. His adduction is slowly improving as well.     PLAN: Continue to progress with proximal strengthening and maintianing range that was available today.    Signature: Jesus Alberto Zhang, PT DPT

## 2021-10-29 ENCOUNTER — TREATMENT (OUTPATIENT)
Dept: PHYSICAL THERAPY | Facility: CLINIC | Age: 51
End: 2021-10-29

## 2021-10-29 DIAGNOSIS — Z47.89 AFTERCARE FOLLOWING SURGERY OF THE MUSCULOSKELETAL SYSTEM: Primary | ICD-10-CM

## 2021-10-29 DIAGNOSIS — S46.012S TRAUMATIC COMPLETE TEAR OF LEFT ROTATOR CUFF, SEQUELA: ICD-10-CM

## 2021-10-29 DIAGNOSIS — G89.29 CHRONIC LEFT SHOULDER PAIN: ICD-10-CM

## 2021-10-29 DIAGNOSIS — M25.512 CHRONIC LEFT SHOULDER PAIN: ICD-10-CM

## 2021-10-29 PROCEDURE — 97110 THERAPEUTIC EXERCISES: CPT | Performed by: PHYSICAL THERAPIST

## 2021-10-29 NOTE — PROGRESS NOTES
Physical Therapy Treatment Note    Patient: Levi Hollingsworth                                                                                     Visit Date: 10/29/2021  :     1970    Referring practitioner:    Alex [place] MD  Date of Initial Visit:          Type: THERAPY  Noted: 2021    Patient seen for 16 sessions    Visit Diagnoses:    ICD-10-CM ICD-9-CM   1. Aftercare following surgery of the musculoskeletal system  Z47.89 V58.78   2. Traumatic complete tear of left rotator cuff, sequela  S46.012S 905.7   3. Chronic left shoulder pain  M25.512 719.41    G89.29 338.29     SUBJECTIVE     Subjective He had increased discomfort and muscle soreness following our last session.    PAIN: 2/10         OBJECTIVE     Objective      Manual Therapy     03426  Comments   L shoulder, deltoid STM        Timed Minutes 10           Therapeutic Exercises    30349 Comments   PROM L shoulder in flexion, abduction, scpation     HL Y with 2lb t-bar 2 x 10   AAROM L shoulder ER with 2lb t-bar 2 x 10   AAROM in scaption with 2lb t-bar 2 x 10   L shoulder extension walk outs with TRX straps 2 x 10       Timed Minutes 45       Therapy Education/Self Care 28900   Details:    Given postural retraining  symptom relief   Progress: Reinforced   Education provided to:  Patient   Level of understanding Verbalized   Timed Minutes        Access Code: [email or web address]  URL: [email or web address]  Date: 2021  Prepared by: Shelby Rios     Exercises  Sleeper Stretch - 1-2 x daily - 7 x weekly - 3 reps - 30 sec hold     Total Timed Treatment:     45   mins  Total Time of Visit:             45   mins         ASSESSMENT/PLAN     GOALS    Goals                                          Progress Note due by 2021                                                      Recert due by 2021   STG by: 3 weeks Comments Date Status   In supine, he will have L shoulder flexion equal to R. R 177  L 158 10/22  ongoing   Will be able to  passively get L hand to R shoulder with therapist assist Able to reach front of shoulder during adduction but has pain 10/22  ongoing   AROM against gravity L shoulder flexion 100 or greater  110 degrees AROM post STM 10/8/21  Met             LTG by: 6 weeks         B shoulder elevation nearly equal.  decreased on the L compared to the L 10/22  ongoing   Strengthening tolerated once lifting restrictions cleared with minimal pain  we have been progressing with strengthening activities while staying under his 10-15 lb limit 10/22  ongoing   Able to actively perform horizontal adduction and touch R shoulder with L hand  can touch with finger tips but has increased pain 10/22  ongoing   Progress treatment and HEP as allowed per surgeon.  compliant and consistent with his HEP 10/22  ongoing   Return to work training as indicated  unable to return to work at this time 10/22  ongoing       Assessment/Plan     ASSESSMENT: He was more sore last session and felt like the shoulder did tightnen up on him. We spent the majority of the session working on his mobility and getting the muscles to relax through STM, passive ROM and AAROM.      PLAN: Assess mobility and soft tissue restrictions and progress with weighted resistance training.    Signature: Jesus Alberto Zhang, PT DPT

## 2021-11-01 ENCOUNTER — TREATMENT (OUTPATIENT)
Dept: PHYSICAL THERAPY | Facility: CLINIC | Age: 51
End: 2021-11-01

## 2021-11-01 DIAGNOSIS — Z47.89 AFTERCARE FOLLOWING SURGERY OF THE MUSCULOSKELETAL SYSTEM: Primary | ICD-10-CM

## 2021-11-01 DIAGNOSIS — G89.29 CHRONIC LEFT SHOULDER PAIN: ICD-10-CM

## 2021-11-01 DIAGNOSIS — S46.012S TRAUMATIC COMPLETE TEAR OF LEFT ROTATOR CUFF, SEQUELA: ICD-10-CM

## 2021-11-01 DIAGNOSIS — M25.512 CHRONIC LEFT SHOULDER PAIN: ICD-10-CM

## 2021-11-01 PROCEDURE — 97110 THERAPEUTIC EXERCISES: CPT | Performed by: PHYSICAL THERAPIST

## 2021-11-01 PROCEDURE — 97140 MANUAL THERAPY 1/> REGIONS: CPT | Performed by: PHYSICAL THERAPIST

## 2021-11-01 NOTE — PROGRESS NOTES
"Physical Therapy Treatment Note    Patient: Levi Hollingsworth                                                                                     Visit Date: 2021  :     1970    Referring practitioner:    Alex [place] MD  Date of Initial Visit:          Type: THERAPY  Noted: 2021    Patient seen for 17 sessions    Visit Diagnoses:    ICD-10-CM ICD-9-CM   1. Aftercare following surgery of the musculoskeletal system  Z47.89 V58.78   2. Traumatic complete tear of left rotator cuff, sequela  S46.012S 905.7   3. Chronic left shoulder pain  M25.512 719.41    G89.29 338.29     SUBJECTIVE     Subjective He reports increased stiffness since his last session. He felt loose for about a day after therapy and then it all started.    PAIN: 2/10         OBJECTIVE     Objective      Manual Therapy     71860  Comments   L shoulder, UT, deltoid STM        Timed Minutes 10        Dry Needle Location [ (1-2 muscles)/ (3 or more muscles)]   Location Depth (\") Comment   L spinal accessory  2 x2   L symptomatic points around the shoulder 2 x4   L biceps 2 x2     15 min         Therapeutic Exercises    45708 Comments   PROM L shoulder in flexion, abduction, scpation     HL Y with 4lb t-bar 2 x 10   AAROM L shoulder abduction  with 4lb t-bar 2 x 10   Arm ergometer seat 10, lvl 5 3 min fwd, 3 min bwd           Timed Minutes 20       Therapy Education/Self Care 55629   Details:    Given postural retraining  symptom relief   Progress: Reinforced   Education provided to:  Patient   Level of understanding Verbalized   Timed Minutes        Access Code: [email or web address]  URL: [email or web address]  Date: 2021  Prepared by: Shelby Rios     Exercises  Sleeper Stretch - 1-2 x daily - 7 x weekly - 3 reps - 30 sec hold     Total Timed Treatment:     45   mins  Total Time of Visit:             45   mins         ASSESSMENT/PLAN     GOALS    Goals                                          Progress Note due by " 11/21/2021                                                      Recert due by 12/22/2021   STG by: 3 weeks Comments Date Status   In supine, he will have L shoulder flexion equal to R. R 177  L 173 11/1  ongoing   Will be able to passively get L hand to R shoulder with therapist assist Able to reach front of shoulder during adduction but has pain 10/22  ongoing   AROM against gravity L shoulder flexion 100 or greater  110 degrees AROM post STM 10/8/21  Met             LTG by: 6 weeks         B shoulder elevation nearly equal.  decreased on the L compared to the L 10/22  ongoing   Strengthening tolerated once lifting restrictions cleared with minimal pain  we have been progressing with strengthening activities while staying under his 10-15 lb limit 10/22  ongoing   Able to actively perform horizontal adduction and touch R shoulder with L hand  can touch with finger tips but has increased pain 10/22  ongoing   Progress treatment and HEP as allowed per surgeon.  compliant and consistent with his HEP 10/22  ongoing   Return to work training as indicated  unable to return to work at this time 10/22  ongoing       Assessment/Plan     ASSESSMENT: Things stiffened up since our last session, his upper trap was very guarded today. We utilized dry needling for the first time in which he responded well and did have less guarding and felt more mobility. We continue to try and balance mobility while progressing strengthening without an increase is guarding and losing mobility.    PLAN: Assess response to dry needling and assess muscle tension in the upper traps and L shoulder. Progress with strengthening as able.     Signature: Jesus Alberto Zhang, PT DPT

## 2021-11-03 ENCOUNTER — TREATMENT (OUTPATIENT)
Dept: PHYSICAL THERAPY | Facility: CLINIC | Age: 51
End: 2021-11-03

## 2021-11-03 DIAGNOSIS — S46.012S TRAUMATIC COMPLETE TEAR OF LEFT ROTATOR CUFF, SEQUELA: ICD-10-CM

## 2021-11-03 DIAGNOSIS — M25.512 CHRONIC LEFT SHOULDER PAIN: ICD-10-CM

## 2021-11-03 DIAGNOSIS — Z47.89 AFTERCARE FOLLOWING SURGERY OF THE MUSCULOSKELETAL SYSTEM: Primary | ICD-10-CM

## 2021-11-03 DIAGNOSIS — G89.29 CHRONIC LEFT SHOULDER PAIN: ICD-10-CM

## 2021-11-03 PROCEDURE — 97140 MANUAL THERAPY 1/> REGIONS: CPT | Performed by: PHYSICAL THERAPIST

## 2021-11-03 PROCEDURE — 97110 THERAPEUTIC EXERCISES: CPT | Performed by: PHYSICAL THERAPIST

## 2021-11-03 NOTE — PROGRESS NOTES
Physical Therapy Treatment Note    Patient: Levi Hollingsworth                                                                     Visit Date: 11/3/2021  :     1970    Referring practitioner:    Alex [place] MD  Date of Initial Visit:          Type: THERAPY  Noted: 2021    Patient seen for 18 sessions    Visit Diagnoses:    ICD-10-CM ICD-9-CM   1. Aftercare following surgery of the musculoskeletal system  Z47.89 V58.78   2. Traumatic complete tear of left rotator cuff, sequela  S46.012S 905.7   3. Chronic left shoulder pain  M25.512 719.41    G89.29 338.29     SUBJECTIVE     Subjective He reports the DN'ing worked great, released a lot of tension.    PAIN: 2/10         OBJECTIVE     Objective      Therapeutic Exercises    59716 Comments   IR/ER walk-outs with yellow sandra x20    CW/CCW ball on the wall with #1.5 cuff weight and orange ball x10 each CKC @ 90 flexion, scaption, and abduction                 Timed Minutes 25     Manual Therapy     23103  Comments   STM, DFR R superior bicep,all deltoids Supine with knees on bolster   Lateral humeral glides Grade 1 Supine with knees on bolster               Timed Minutes 20          Therapy Education/Self Care 83216   Details:    Given postural retraining  symptom relief   Progress: New and Reinforced   Education provided to:  Patient   Level of understanding Verbalized   Timed Minutes      Access Code: [email or web address]  URL: [email or web address]  Date: 2021  Prepared by: Shelby Rios     Exercises  Sleeper Stretch - 1-2 x daily - 7 x weekly - 3 reps - 30 sec hold       Total Timed Treatment:     45   mins  Total Time of Visit:             45   mins         ASSESSMENT/PLAN     GOALS    Goals                                          Progress Note due by 2021                                                      Recert due by 2021   STG by: 3 weeks Comments Date Status   In  supine, he will have L shoulder flexion equal to R. R 177  L 173 11/1  ongoing   Will be able to passively get L hand to R shoulder with therapist assist AROM can reach to mid collar bone 11/3/21  ongoing   AROM against gravity L shoulder flexion 100 or greater  110 degrees AROM post STM 10/8/21  Met             LTG by: 6 weeks         B shoulder elevation nearly equal.  decreased on the L compared to the L 10/22  ongoing   Strengthening tolerated once lifting restrictions cleared with minimal pain  we have been progressing with strengthening activities while staying under his 10-15 lb limit 10/22  ongoing   Able to actively perform horizontal adduction and touch R shoulder with L hand  can touch with finger tips but has increased pain 10/22  ongoing   Progress treatment and HEP as allowed per surgeon.  compliant and consistent with his HEP 10/22  ongoing   Return to work training as indicated  unable to return to work at this time 10/22  ongoing       Assessment/Plan     ASSESSMENT: Today we utilized some CKC exercises with lighter resistance than previous session and some isometric strengthening as well. He was flared after a recent session as he tends to activate his upper trap with most active L shoulder movements.    PLAN: Assess his long term response to today's session and progress accordingly.    SIGNATURE: Stef Johnson PTA, License #: [id number]  Electronically Signed on 11/3/2021        [address]  [place] [address]  [phone]

## 2021-11-05 ENCOUNTER — TREATMENT (OUTPATIENT)
Dept: PHYSICAL THERAPY | Facility: CLINIC | Age: 51
End: 2021-11-05

## 2021-11-05 DIAGNOSIS — M25.512 CHRONIC LEFT SHOULDER PAIN: ICD-10-CM

## 2021-11-05 DIAGNOSIS — G89.29 CHRONIC LEFT SHOULDER PAIN: ICD-10-CM

## 2021-11-05 DIAGNOSIS — Z47.89 AFTERCARE FOLLOWING SURGERY OF THE MUSCULOSKELETAL SYSTEM: Primary | ICD-10-CM

## 2021-11-05 DIAGNOSIS — S46.012S TRAUMATIC COMPLETE TEAR OF LEFT ROTATOR CUFF, SEQUELA: ICD-10-CM

## 2021-11-05 PROCEDURE — 97110 THERAPEUTIC EXERCISES: CPT | Performed by: PHYSICAL THERAPIST

## 2021-11-05 PROCEDURE — 97140 MANUAL THERAPY 1/> REGIONS: CPT | Performed by: PHYSICAL THERAPIST

## 2021-11-05 NOTE — PROGRESS NOTES
"                                                                Physical Therapy Treatment Note    Patient: Levi Hollingsworth                                                                     Visit Date: 2021  :     1970    Referring practitioner:    Alex [place] MD  Date of Initial Visit:          Type: THERAPY  Noted: 2021    Patient seen for 19 sessions    Visit Diagnoses:    ICD-10-CM ICD-9-CM   1. Aftercare following surgery of the musculoskeletal system  Z47.89 V58.78   2. Traumatic complete tear of left rotator cuff, sequela  S46.012S 905.7   3. Chronic left shoulder pain  M25.512 719.41    G89.29 338.29     SUBJECTIVE     Subjective He reports that his mobility is feeling pretty good today.     PAIN: 2/10         OBJECTIVE     Objective      Dry Needle Location [ (1-2 muscles)/ (3 or more muscles)]   Location Depth (\") Comment   L biceps and symptomatic area 2 Proximal to distal      15 min     Therapeutic Exercises    59380 Comments   Flexion, scaption and extension walk outs with TRX straps    Seated anterior deltoid raises with YTB 2 x 10   Low row with YTB 2 x 10   Wall slides in flexion and abduction  2 x 10   Lateral deltoid raises         Timed Minutes 22     Manual Therapy     00894  Comments   R UT, deltoid and biceps STM        Timed Minutes 8          Therapy Education/Self Care 40148   Details:    Given postural retraining  symptom relief   Progress: New and Reinforced   Education provided to:  Patient   Level of understanding Verbalized   Timed Minutes      Access Code: [email or web address]  URL: [email or web address]  Date: 2021  Prepared by: Shelby Rios     Exercises  Sleeper Stretch - 1-2 x daily - 7 x weekly - 3 reps - 30 sec hold       Total Timed Treatment:     45   mins  Total Time of Visit:             45   mins         ASSESSMENT/PLAN     GOALS    Goals                                          Progress Note due by " 11/21/2021                                                      Recert due by 12/22/2021   STG by: 3 weeks Comments Date Status   In supine, he will have L shoulder flexion equal to R. R 177  L 173 11/1  ongoing   Will be able to passively get L hand to R shoulder with therapist assist AROM can reach to mid collar bone 11/3/21  ongoing   AROM against gravity L shoulder flexion 100 or greater  110 degrees AROM post STM 10/8/21  Met             LTG by: 6 weeks         B shoulder elevation nearly equal. Increased L elevation with resistance  11/5  ongoing   Strengthening tolerated once lifting restrictions cleared with minimal pain  we have been progressing with strengthening activities while staying under his 10-15 lb limit 10/22  ongoing   Able to actively perform horizontal adduction and touch R shoulder with L hand  can touch with finger tips but has increased pain 10/22  ongoing   Progress treatment and HEP as allowed per surgeon.  compliant and consistent with his HEP 10/22  ongoing   Return to work training as indicated  unable to return to work at this time 10/22  ongoing       Assessment/Plan     ASSESSMENT: We needled the biceps area from proximal to distal to see if this would assist in modulating pain and improving mobility. It did both in assisting his pain and improving motion enabling him to lift the arm the highest thus far and demonstrate improvement with strengthening. It seems that the pain has been the primary limiting factor in his motion and with strengthening if we can control this he should continue to progress well.    PLAN: Assess his response to needling followed by strengthening. Needle again next session and     SIGNATURE: Jesus Alberto Zhang, PT DPT, License #: [id number]  Electronically Signed on 11/5/2021        [address] [place] [address]. [address]  [phone]

## 2021-11-08 ENCOUNTER — TREATMENT (OUTPATIENT)
Dept: PHYSICAL THERAPY | Facility: CLINIC | Age: 51
End: 2021-11-08

## 2021-11-08 DIAGNOSIS — Z47.89 AFTERCARE FOLLOWING SURGERY OF THE MUSCULOSKELETAL SYSTEM: Primary | ICD-10-CM

## 2021-11-08 DIAGNOSIS — M25.512 CHRONIC LEFT SHOULDER PAIN: ICD-10-CM

## 2021-11-08 DIAGNOSIS — G89.29 CHRONIC LEFT SHOULDER PAIN: ICD-10-CM

## 2021-11-08 DIAGNOSIS — S46.012S TRAUMATIC COMPLETE TEAR OF LEFT ROTATOR CUFF, SEQUELA: ICD-10-CM

## 2021-11-08 PROCEDURE — 97110 THERAPEUTIC EXERCISES: CPT | Performed by: PHYSICAL THERAPIST

## 2021-11-08 PROCEDURE — 97140 MANUAL THERAPY 1/> REGIONS: CPT | Performed by: PHYSICAL THERAPIST

## 2021-11-08 NOTE — PROGRESS NOTES
"                                                                Physical Therapy Treatment Note    Patient: Levi Hollingsworth                                                                     Visit Date: 2021  :     1970    Referring practitioner:    Alex [place] MD  Date of Initial Visit:          Type: THERAPY  Noted: 2021    Patient seen for 20 sessions    Visit Diagnoses:    ICD-10-CM ICD-9-CM   1. Aftercare following surgery of the musculoskeletal system  Z47.89 V58.78   2. Traumatic complete tear of left rotator cuff, sequela  S46.012S 905.7   3. Chronic left shoulder pain  M25.512 719.41    G89.29 338.29     SUBJECTIVE     Subjective Pt reports that he hasn't had the severe pain in the biceps region since our last session, and his range has stayed better.     PAIN: 1/10         OBJECTIVE     Objective      Dry Needle Location [ (1-2 muscles)/ (3 or more muscles)]   Location Depth (\") Comment   L biceps and symptomatic area 2 Proximal to distal    L shoulder around rotator cuff 1 15 min     Therapeutic Exercises    71696 Comments   HL Y with 4 lb wand    HL diagonal with 4 lb wand 2 x 10   Walk outs in flexion, scaption and extension with TRX straps    Rows on cybex machine lvl 2 2 x 10           Timed Minutes 22     Manual Therapy     93164  Comments   R UT, deltoid and biceps STM        Timed Minutes 8          Therapy Education/Self Care 49117   Details:    Given postural retraining  symptom relief   Progress: New and Reinforced   Education provided to:  Patient   Level of understanding Verbalized   Timed Minutes      Access Code: [email or web address]  URL: [email or web address]  Date: 2021  Prepared by: Shelby De Leon  Sleeper Stretch - 1-2 x daily - 7 x weekly - 3 reps - 30 sec hold       Total Timed Treatment:     45   mins  Total Time of Visit:             45   mins         ASSESSMENT/PLAN     GOALS    Goals                                          Progress " Note due by 11/21/2021                                                      Recert due by 12/22/2021   STG by: 3 weeks Comments Date Status   In supine, he will have L shoulder flexion equal to R. R 177  L 173 11/1  ongoing   Will be able to passively get L hand to R shoulder with therapist assist AROM can reach to mid collar bone 11/3/21  ongoing   AROM against gravity L shoulder flexion 100 or greater  110 degrees AROM post STM 10/8/21  Met             LTG by: 6 weeks         B shoulder elevation nearly equal. Increased L elevation with resistance  11/5  ongoing   Strengthening tolerated once lifting restrictions cleared with minimal pain  we have been progressing with strengthening activities while staying under his 10-15 lb limit 10/22  ongoing   Able to actively perform horizontal adduction and touch R shoulder with L hand  can touch with finger tips but has increased pain 10/22  ongoing   Progress treatment and HEP as allowed per surgeon.  compliant and consistent with his HEP 10/22  ongoing   Return to work training as indicated  unable to return to work at this time 10/22  ongoing       Assessment/Plan     ASSESSMENT: We needled again this session as he got good relief after last. This improved his range and allowed us to strengthen in a greater range. He did feel more muscular fatigue with exercises. Hopefully as his pain decreases we will able to progress with more strengthening and endurance activities.    PLAN: Assess his response to needling and the progression of strengthening.    SIGNATURE: Jesus Alberto Zhang PT DPT, License #: [id number]  Electronically Signed on 11/8/2021        [address] [place] [address] [place] [address]  [phone]

## 2021-11-10 ENCOUNTER — TREATMENT (OUTPATIENT)
Dept: PHYSICAL THERAPY | Facility: CLINIC | Age: 51
End: 2021-11-10

## 2021-11-10 DIAGNOSIS — G89.29 CHRONIC LEFT SHOULDER PAIN: ICD-10-CM

## 2021-11-10 DIAGNOSIS — Z47.89 AFTERCARE FOLLOWING SURGERY OF THE MUSCULOSKELETAL SYSTEM: Primary | ICD-10-CM

## 2021-11-10 DIAGNOSIS — M25.512 CHRONIC LEFT SHOULDER PAIN: ICD-10-CM

## 2021-11-10 DIAGNOSIS — S46.012S TRAUMATIC COMPLETE TEAR OF LEFT ROTATOR CUFF, SEQUELA: ICD-10-CM

## 2021-11-10 PROCEDURE — 97530 THERAPEUTIC ACTIVITIES: CPT | Performed by: PHYSICAL THERAPIST

## 2021-11-10 PROCEDURE — 97110 THERAPEUTIC EXERCISES: CPT | Performed by: PHYSICAL THERAPIST

## 2021-11-10 NOTE — PROGRESS NOTES
"                                                                Physical Therapy Treatment Note    Patient: Levi Hollingsworth                                                                     Visit Date: 11/10/2021  :     1970    Referring practitioner:    Alex [place] MD  Date of Initial Visit:          Type: THERAPY  Noted: 2021    Patient seen for 21 sessions    Visit Diagnoses:    ICD-10-CM ICD-9-CM   1. Aftercare following surgery of the musculoskeletal system  Z47.89 V58.78   2. Traumatic complete tear of left rotator cuff, sequela  S46.012S 905.7   3. Chronic left shoulder pain  M25.512 719.41    G89.29 338.29     SUBJECTIVE     Subjective Pt reports that his pain has returned in the biceps region and his range has decreased.    PAIN: 2/10         OBJECTIVE     Objective      Dry Needle Location [ (1-2 muscles)/ (3 or more muscles)]   Location Depth (\") Comment   L biceps and symptomatic area 2 Proximal to distal      10 min     Therapeutic Exercises    01958 Comments   Arm ergometer resistance 4  3 min fwd, 3 min bwd   L shoulder pull downs on cybex lvl 2 with eccentric control into flexion 2 x 10   L shoulder abduction on cybex lvl 2 2 x 10   Prone I 2 x 10   Prone T 2 x 10   Prone row with 3lb weight  2 x 10   Walk outs with TRX straps in flexion, scaption and extension             Timed Minutes 40     Therapeutic Activities    37757 Comments   50 lb sled push/pull     9 lb ball lift from waist to shoulder height  2 x 10   Attempted 9 lb ball lift from shoulder to overhead unable due to pain             Timed Minutes 10       Therapy Education/Self Care 63468   Details:    Given postural retraining  symptom relief   Progress: New and Reinforced   Education provided to:  Patient   Level of understanding Verbalized   Timed Minutes      Access Code: [email or web address]  URL: [email or web address]      Total Timed Treatment:     50   mins  Total Time of Visit:             60   mins       "   ASSESSMENT/PLAN     GOALS    Goals                                          Progress Note due by 11/21/2021                                                      Recert due by 12/22/2021   STG by: 3 weeks Comments Date Status   In supine, he will have L shoulder flexion equal to R. R 177  L 173 11/1  ongoing   Will be able to passively get L hand to R shoulder with therapist assist AROM can reach to mid collar bone 11/3/21  ongoing   AROM against gravity L shoulder flexion 100 or greater  110 degrees AROM post STM 10/8/21  Met             LTG by: 6 weeks         B shoulder elevation nearly equal. Increased L elevation with resistance  11/5  ongoing   Strengthening tolerated once lifting restrictions cleared with minimal pain  we have been progressing with strengthening activities while staying under his 10-15 lb limit 10/22  ongoing   Able to actively perform horizontal adduction and touch R shoulder with L hand  can touch with finger tips but has increased pain 10/22  ongoing   Progress treatment and HEP as allowed per surgeon.  compliant and consistent with his HEP 10/22  ongoing   Return to work training as indicated Utilized some work simulated tasks today difficulty with overhead task 11/10  ongoing       Assessment/Plan     ASSESSMENT: His pain was increased since our last session and his range was decreased. We utilized needling again to get some relief and improve ROM, it did help but he did not get as much benefit as the last couple of times needling. His rnage did improve as we progressed through the session but still had an area around 120 deg flexion and abduction with pain. We progressed with some work simulated tasks today he was able to lift a 9lb ball from waist to shoulder height with muscle fatigue noted, however could not go from waist to overhead or from shoulder height to overhead due to increased pain.     PLAN: Continue to progress with strengthening and work simulated tasks as symptoms  allow.    SIGNATURE: Jesus Alberto Zhang PT DPT, License #: [id number]  Electronically Signed on 11/10/2021        [address] [place] [address] [place] [address]  [phone]

## 2021-11-12 ENCOUNTER — TREATMENT (OUTPATIENT)
Dept: PHYSICAL THERAPY | Facility: CLINIC | Age: 51
End: 2021-11-12

## 2021-11-12 DIAGNOSIS — G89.29 CHRONIC LEFT SHOULDER PAIN: ICD-10-CM

## 2021-11-12 DIAGNOSIS — Z47.89 AFTERCARE FOLLOWING SURGERY OF THE MUSCULOSKELETAL SYSTEM: Primary | ICD-10-CM

## 2021-11-12 DIAGNOSIS — S46.012S TRAUMATIC COMPLETE TEAR OF LEFT ROTATOR CUFF, SEQUELA: ICD-10-CM

## 2021-11-12 DIAGNOSIS — M25.512 CHRONIC LEFT SHOULDER PAIN: ICD-10-CM

## 2021-11-12 PROCEDURE — 97530 THERAPEUTIC ACTIVITIES: CPT | Performed by: PHYSICAL THERAPIST

## 2021-11-12 PROCEDURE — 97110 THERAPEUTIC EXERCISES: CPT | Performed by: PHYSICAL THERAPIST

## 2021-11-12 NOTE — PROGRESS NOTES
Physical Therapy Treatment Note and 30 Day Progress Note    Patient: Levi Hollingsworth                                                                     Visit Date: 2021  :     1970    Referring practitioner:    Alex Hamilton MD  Date of Initial Visit:          Type: THERAPY  Noted: 2021    Patient seen for 22 sessions    Visit Diagnoses:    ICD-10-CM ICD-9-CM   1. Aftercare following surgery of the musculoskeletal system  Z47.89 V58.78   2. Traumatic complete tear of left rotator cuff, sequela  S46.012S 905.7   3. Chronic left shoulder pain  M25.512 719.41    G89.29 338.29     SUBJECTIVE     Subjective Pt reports that he continues to have increased pain and stiffness following our last session.    PAIN: 2/10         OBJECTIVE     Objective        Therapeutic Exercises    51858 Comments   Arm ergometer resistance 4  3 min fwd, 3 min bwd   HL Y with 5 lb wand x20   L shoulder 9lb ball lift with pronation/supination  2 x 10   Seated W with 5 lb weights  3 x 10   Lateral deltoid raises with 3lb weight  3 x 5   Timed Minutes 25     Therapeutic Activities    55236 Comments   9 lb ball lift from waist to shoulder height  2 x 10   4 lb lift from waist to shoulder and shoulder to above head 2 x 10   4 lb L arm lift from just above shoulder to overhead X 10       Timed Minutes 20       Therapy Education/Self Care 94653   Details:    Given postural retraining  symptom relief   Progress: New and Reinforced   Education provided to:  Patient   Level of understanding Verbalized   Timed Minutes      Access Code: [email or web address]  URL: [email or web address]      Total Timed Treatment:     45   mins  Total Time of Visit:             45   mins         ASSESSMENT/PLAN     GOALS    Goals                                          Progress Note due by 2021                                                      Recert due by 2021   STG by: 3  weeks Comments Date Status   In supine, he will have L shoulder flexion equal to R. Seated 150  R 177  L 173 on 11/1   L 175 on 11/12 11/2  ongoing   Will be able to passively get L hand to R shoulder with therapist assist Front of collar bone this date with tips of 4 index fingers  11/12 Met   AROM against gravity L shoulder flexion 100 or greater  110 degrees AROM post STM 10/8/21  Met             LTG by: 6 weeks         B shoulder elevation nearly equal. R 177 deg  L 150 deg 11/12  ongoing   Strengthening tolerated once lifting restrictions cleared with minimal pain  we have been progressing with strengthening activities while staying under his 10-15 lb limit pain remains 4-5/10 11/12  ongoing   Able to actively perform horizontal adduction and touch R shoulder with L hand  can touch with finger tips but has increased pain 11/12  ongoing   Progress treatment and HEP as allowed per surgeon.  compliant and consistent with his HEP and therapy  11/12  ongoing   Return to work training as indicated Progressed with work simulated activities  11/10  ongoing       Assessment & Plan     Assessment  Impairments: abnormal or restricted ROM, activity intolerance, impaired physical strength, lacks appropriate home exercise program and pain with function  Barriers to therapy: none  Prognosis: good  Functional Limitations: carrying objects, lifting, pulling, pushing, uncomfortable because of pain, reaching behind back and reaching overhead  Plan  Therapy options: will be seen for skilled physical therapy services  Planned modality interventions: low level laser therapy, high voltage pulsed current (pain management) and dry needling  Planned therapy interventions: manual therapy, neuromuscular re-education, soft tissue mobilization, spinal/joint mobilization, strengthening, stretching, therapeutic activities, transfer training, joint mobilization, home exercise program, functional ROM exercises and flexibility  Frequency: 2x  week (2-3 X/wk)  Duration in weeks: 6  Treatment plan discussed with: patient         ASSESSMENT: At this time he has met two of his goals and is progressing towards the remaining. His primary limitation remains pain in the anterior and lateral portion of the shoulder. He can push through it and his range increases but it prevents him from lifting more than 10 lbs with the arm extended. There is still significant weakness with arm extended activity. We continue to try and progress with resistance at a gradual rate. At this point he is nearly 6 months out so structurally everything should be sound. He has a follow up with his surgeon next week. We will continue to work on his range along with strengthen and modulate pain.     PLAN: Continue to progress with strengthening and work simulated tasks. Utilize dry needling as need to assist in pain modulation.    SIGNATURE: Jesus Alberto Zhang PT DPT, License #: [id number]  Electronically Signed on 11/12/2021        [address] [place] [address] [place] [address]  [phone]

## 2021-11-15 ENCOUNTER — TREATMENT (OUTPATIENT)
Dept: PHYSICAL THERAPY | Facility: CLINIC | Age: 51
End: 2021-11-15

## 2021-11-15 DIAGNOSIS — G89.29 CHRONIC LEFT SHOULDER PAIN: ICD-10-CM

## 2021-11-15 DIAGNOSIS — S46.012S TRAUMATIC COMPLETE TEAR OF LEFT ROTATOR CUFF, SEQUELA: ICD-10-CM

## 2021-11-15 DIAGNOSIS — M25.512 CHRONIC LEFT SHOULDER PAIN: ICD-10-CM

## 2021-11-15 DIAGNOSIS — Z47.89 AFTERCARE FOLLOWING SURGERY OF THE MUSCULOSKELETAL SYSTEM: Primary | ICD-10-CM

## 2021-11-15 PROCEDURE — 97530 THERAPEUTIC ACTIVITIES: CPT | Performed by: PHYSICAL THERAPIST

## 2021-11-15 PROCEDURE — 97110 THERAPEUTIC EXERCISES: CPT | Performed by: PHYSICAL THERAPIST

## 2021-11-15 NOTE — PROGRESS NOTES
Physical Therapy Treatment Note    Patient: Levi Hollingsworth                                                                     Visit Date: 11/15/2021  :     1970    Referring practitioner:    Alex Hamilton MD  Date of Initial Visit:          Type: THERAPY  Noted: 2021    Patient seen for 23 sessions    Visit Diagnoses:    ICD-10-CM ICD-9-CM   1. Aftercare following surgery of the musculoskeletal system  Z47.89 V58.78   2. Traumatic complete tear of left rotator cuff, sequela  S46.012S 905.7   3. Chronic left shoulder pain  M25.512 719.41    G89.29 338.29     SUBJECTIVE     Subjective Pt reports that he was a little stiff and sore following our last session but it wasn't too bad. He worked it over the weekend, still had some pain but it is moving better.    PAIN: 1/10         OBJECTIVE     Objective        Therapeutic Exercises    90798 Comments   Supine shoulder flexion to 90 anti-rotations with RTB 3 x 30 sec   Supine resisted shoulder flexion RTB 3 x 10   Supine diagonals with RTB 3 x 10   L arm 4lb ball toss 3 x 10   IR/ER body blade elbow at side  3 x 30 sec   IR/ER body blade arm extended  2 x 15 sec, 1 x 30 sec   Shoulder flexion 90 deg body blade 3 x 30 sec   Lateral raises 2 x 10   L shoulder TRX walk outs in flexion, scaption and extension  x10 each direction    2lb wand pronation/supination  2 x 10   Arm ergometer resistance 4.5 3 min fwd, 3 min bwd   Timed Minutes 50     Therapeutic Activities    41588 Comments   9 lb ball lift from waist to shoulder height  10   4 lb lift from waist to shoulder and shoulder to above head 10   4 lb L arm lift from just above shoulder to overhead 10       Timed Minutes 10       Therapy Education/Self Care 19635   Details:    Given postural retraining  symptom relief   Progress: New and Reinforced   Education provided to:  Patient   Level of understanding Verbalized   Timed Minutes      Access  Code: [email or web address]  URL: [email or web address]      Total Timed Treatment:     60   mins  Total Time of Visit:             60   mins         ASSESSMENT/PLAN     GOALS    Goals                                          Progress Note due by 12/12/2021                                                      Recert due by 12/22/2021   STG by: 3 weeks Comments Date Status   In supine, he will have L shoulder flexion equal to R. Seated 150  R 177  L 173 on 11/1   L 175 on 11/12 11/2  ongoing   Will be able to passively get L hand to R shoulder with therapist assist Front of collar bone this date with tips of 4 index fingers  11/12 Met   AROM against gravity L shoulder flexion 100 or greater  110 degrees AROM post STM 10/8/21  Met             LTG by: 6 weeks         B shoulder elevation nearly equal. R 177 deg  L 150 deg 11/12  ongoing   Strengthening tolerated once lifting restrictions cleared with minimal pain  we have been progressing with strengthening activities while staying under his 10-15 lb limit pain remains 4-5/10 11/12  ongoing   Able to actively perform horizontal adduction and touch R shoulder with L hand  can touch with finger tips but has increased pain 11/12  ongoing   Progress treatment and HEP as allowed per surgeon.  compliant and consistent with his HEP and therapy  11/12  ongoing   Return to work training as indicated Improvement in lifting 9lb ball from waist to shoulder height  11/15  ongoing       Assessment/Plan     ASSESSMENT: Mr. Hollingsworth responded well after our last session of increased resistance and reps. He had some mild soreness and stiffness the following day but was able to work through it. We continued to progress today with activity and reps. He is demonstrating increased strength and endurance with activities such as the body blade and when lifting 4lb ball from shelf to overhead. It is important that as we continue to increase with strengthening taks that we are mindful to continue to  address range as well keeping his current range and mobility.    PLAN: Continue to progress with strengthening and work simulated tasks while also making sure we maintain mobility. Utilize dry needling as need to assist in pain modulation.    SIGNATURE: Jesus Alberto Zhang PT DPT, License #: [id number]  Electronically Signed on 11/15/2021        [address] [place] [address]. [address]  [phone]

## 2021-11-17 ENCOUNTER — TREATMENT (OUTPATIENT)
Dept: PHYSICAL THERAPY | Facility: CLINIC | Age: 51
End: 2021-11-17

## 2021-11-17 DIAGNOSIS — G89.29 CHRONIC LEFT SHOULDER PAIN: ICD-10-CM

## 2021-11-17 DIAGNOSIS — S46.012S TRAUMATIC COMPLETE TEAR OF LEFT ROTATOR CUFF, SEQUELA: ICD-10-CM

## 2021-11-17 DIAGNOSIS — Z47.89 AFTERCARE FOLLOWING SURGERY OF THE MUSCULOSKELETAL SYSTEM: Primary | ICD-10-CM

## 2021-11-17 DIAGNOSIS — M25.512 CHRONIC LEFT SHOULDER PAIN: ICD-10-CM

## 2021-11-17 PROCEDURE — 97110 THERAPEUTIC EXERCISES: CPT | Performed by: PHYSICAL THERAPIST

## 2021-11-17 NOTE — PROGRESS NOTES
"                                                                Physical Therapy Treatment Note    Patient: Levi Hollingsworth                                                                     Visit Date: 2021  :     1970    Referring practitioner:    Alex Hamilton MD  Date of Initial Visit:          Type: THERAPY  Noted: 2021    Patient seen for 24 sessions    Visit Diagnoses:    ICD-10-CM ICD-9-CM   1. Aftercare following surgery of the musculoskeletal system  Z47.89 V58.78   2. Traumatic complete tear of left rotator cuff, sequela  S46.012S 905.7   3. Chronic left shoulder pain  M25.512 719.41    G89.29 338.29     SUBJECTIVE     Subjective Pt reports that following our session Monday afternoon the arm was heavy and he had some soreness but yesterday it was feeling better. Pain  Remains in the L arm around the proximal biceps.    PAIN: 2/10         OBJECTIVE     Objective      Dry Needle Location [ (1-2 muscles)/ (3 or more muscles)]   Location Depth (\") Comment   L symptomatic points around the biceps and deltoid  2      10 min       Therapeutic Exercises    55172 Comments   Overhead press with 3lb wand 3 x 10   Seated Y with 3lb wand 3 x 5   Seated shoulder flexion 3lb wand 3 x 5   Seated W with 5lb weight  3 x 10   6 lb lift from waist to shoulder and shoulder to above head 3 x 5    pulls with 10lb weight  3 x 10   SA wall slides on foam roller  3 x 10   10 lb bicep curl  3 x 10   Seated lateral raises with 2lb weights 3 x 10   Seated L shoulder flexion 90 deg with 2lb weight and therapist perturbations  3 x 30 sec       Timed Minutes 35       Therapy Education/Self Care 99671   Details:    Given postural retraining  symptom relief   Progress: New and Reinforced   Education provided to:  Patient   Level of understanding Verbalized   Timed Minutes      Access Code: [email or web address]  URL: [email or web address]      Total Timed Treatment:     35   mins  Total Time of Visit:  "            45   mins         ASSESSMENT/PLAN     GOALS    Goals                                          Progress Note due by 12/12/2021                                                      Recert due by 12/22/2021   STG by: 3 weeks Comments Date Status   In supine, he will have L shoulder flexion equal to R. Seated 150  R 177  L 173 on 11/1   L 175 on 11/12 11/2  ongoing   Will be able to passively get L hand to R shoulder with therapist assist Front of collar bone this date with tips of 4 index fingers  11/12 Met   AROM against gravity L shoulder flexion 100 or greater  110 degrees AROM post STM 10/8/21  Met             LTG by: 6 weeks         B shoulder elevation nearly equal. R 177 deg  L 150 deg 11/12  ongoing   Strengthening tolerated once lifting restrictions cleared with minimal pain  we have been progressing with strengthening activities while staying under his 10-15 lb limit pain remains 4-5/10 11/12  ongoing   Able to actively perform horizontal adduction and touch R shoulder with L hand  can touch with finger tips but has increased pain 11/12  ongoing   Progress treatment and HEP as allowed per surgeon.  compliant and consistent with his HEP and therapy  11/12  ongoing   Return to work training as indicated Improvement in lifting 9lb ball from waist to shoulder height  11/15  ongoing       Assessment/Plan     ASSESSMENT: We continued to progress with strengthening this date while increasing reps and resistance and maintaining weight restriction. He tolerated this well without an increase in pain and has been doing well avoiding upper trap compensation. We needling along the biceps and deltoid again today this did help to alleviate some pain but there was still some present.    PLAN: Assess how is MD follow up went and see if he continues to have nay restrictions and progress with strengthening accordingly.    SIGNATURE: Jesus Alberto Zhang PT DPT, License #: [id number]  Electronically Signed on  11/17/2021        [address] [place] [address]. [address]  [phone]

## 2021-11-19 ENCOUNTER — TREATMENT (OUTPATIENT)
Dept: PHYSICAL THERAPY | Facility: CLINIC | Age: 51
End: 2021-11-19

## 2021-11-19 DIAGNOSIS — G89.29 CHRONIC LEFT SHOULDER PAIN: ICD-10-CM

## 2021-11-19 DIAGNOSIS — S46.012S TRAUMATIC COMPLETE TEAR OF LEFT ROTATOR CUFF, SEQUELA: ICD-10-CM

## 2021-11-19 DIAGNOSIS — Z47.89 AFTERCARE FOLLOWING SURGERY OF THE MUSCULOSKELETAL SYSTEM: Primary | ICD-10-CM

## 2021-11-19 DIAGNOSIS — M25.512 CHRONIC LEFT SHOULDER PAIN: ICD-10-CM

## 2021-11-19 PROCEDURE — 97530 THERAPEUTIC ACTIVITIES: CPT | Performed by: PHYSICAL THERAPIST

## 2021-11-19 PROCEDURE — 97110 THERAPEUTIC EXERCISES: CPT | Performed by: PHYSICAL THERAPIST

## 2021-11-19 NOTE — PROGRESS NOTES
Physical Therapy Treatment Note and Discharge Note    Patient: Levi Hollingsworth                                                                     Visit Date: 2021  :     1970    Referring practitioner:    Alex Hamilton MD  Date of Initial Visit:          Type: THERAPY  Noted: 2021    Patient seen for 25 sessions    Visit Diagnoses:    ICD-10-CM ICD-9-CM   1. Aftercare following surgery of the musculoskeletal system  Z47.89 V58.78   2. Traumatic complete tear of left rotator cuff, sequela  S46.012S 905.7   3. Chronic left shoulder pain  M25.512 719.41    G89.29 338.29     SUBJECTIVE     Subjective He had his follow up with the MD yesterday and he released him for full work duty with no lifting restrictions. Today will be his last visit as he returns to work.    PAIN: 2/10         OBJECTIVE     Objective        Therapeutic Exercises    41915 Comments   Overhead 15lb ball lift 3 x 5   Seated 5lb ball toss 2 x 10   Body blade IR/ER, flexion 3 x 30 sec each    Rows with TRX straps 2 x 10       Timed Minutes 30     Therapeutic Activities    09394 Comments   15lb box lift from waist to shoulder height  2 x 5   15 lb box from shoulder to overhead  2 x 5       Timed Minutes 10       Therapy Education/Self Care 32717   Details:    Given postural retraining  symptom relief   Progress: New and Reinforced   Education provided to:  Patient   Level of understanding Verbalized   Timed Minutes      Access Code: [email or web address]  URL: [email or web address]      Total Timed Treatment:     40   mins  Total Time of Visit:             40   mins         ASSESSMENT/PLAN     GOALS    Goals                                          Progress Note due by 2021                                                      Recert due by 2021   STG by: 3 weeks Comments Date Status   In supine, he will have L shoulder flexion equal to R. Seated 150  R 177  L 173  on 11/1   L 175 on 11/12 11/2  ongoing   Will be able to passively get L hand to R shoulder with therapist assist Front of collar bone this date with tips of 4 index fingers  11/12 Met   AROM against gravity L shoulder flexion 100 or greater  110 degrees AROM post STM 10/8/21  Met             LTG by: 6 weeks         B shoulder elevation nearly equal. R 177 deg  L 150 deg 11/12  ongoing   Strengthening tolerated once lifting restrictions cleared with minimal pain  we have been progressing with strengthening activities while staying under his 10-15 lb limit pain remains 4-5/10 11/12  ongoing   Able to actively perform horizontal adduction and touch R shoulder with L hand  can touch with finger tips but has increased pain 11/12  ongoing   Progress treatment and HEP as allowed per surgeon.  compliant and consistent with his HEP and therapy  11/12  ongoing   Return to work training as indicated Improvement in lifting 9lb ball from waist to shoulder height  11/15  ongoing       Assessment/Plan     ASSESSMENT: He was released from the surgeon yesterday and cleared to return to work with no restrictions. He goes back to work on Monday and worker comp will not continue with therapy due to surgeon orders. He has progressed well and should continue to do well as he returns to his normal activity. We have ramped up his resistance and were able to today as well today with no restrictions, he tolerated activity well but the L shoulder did fatigue quickly. We would be glad to work with him again in the future if needed.     PLAN: He is going to return to work with no restrictions on Monday so we will place his POC on hold for now.    DISCHARGE SUMMARY   Discharge date 1/26/2022   Dates of this episode 9/22/21 through 11/19/22   Number of visits on this episode 25   Reason for discharge per MD order   Outcomes achieved Refer to the goals table for specifics on goals   Discharge plan Continue with current home exercise program as  instructed   Summary of care We had been working on his shoulder following surgery, his MD was releasing him to return to work. We had seen good progress and would be happy to work with him in the future.   Discharge instruction D/C POC     SIGNATURE: Jesus Alberto Zhang PT DPT, License #: [id number]  Electronically Signed on 11/19/2021        [address] [address]  [phone]

## 2022-05-03 ENCOUNTER — APPOINTMENT (OUTPATIENT)
Dept: GENERAL RADIOLOGY | Facility: HOSPITAL | Age: 52
End: 2022-05-03

## 2022-05-03 ENCOUNTER — APPOINTMENT (OUTPATIENT)
Dept: CT IMAGING | Facility: HOSPITAL | Age: 52
End: 2022-05-03

## 2022-05-03 ENCOUNTER — HOSPITAL ENCOUNTER (EMERGENCY)
Facility: HOSPITAL | Age: 52
Discharge: HOME | End: 2022-05-03
Admitting: EMERGENCY MEDICINE

## 2022-05-03 VITALS
HEIGHT: 70 IN | SYSTOLIC BLOOD PRESSURE: 147 MMHG | OXYGEN SATURATION: 93 % | TEMPERATURE: 98 F | WEIGHT: 315 LBS | HEART RATE: 79 BPM | DIASTOLIC BLOOD PRESSURE: 77 MMHG | BODY MASS INDEX: 45.1 KG/M2 | RESPIRATION RATE: 18 BRPM

## 2022-05-03 DIAGNOSIS — I10 PRIMARY HYPERTENSION: Primary | ICD-10-CM

## 2022-05-03 LAB
ALBUMIN SERPL-MCNC: 4.5 G/DL (ref 3.5–5.2)
ALBUMIN/GLOB SERPL: 1.4 G/DL
ALP SERPL-CCNC: 101 U/L (ref 39–117)
ALT SERPL W P-5'-P-CCNC: 29 U/L (ref 1–41)
ANION GAP SERPL CALCULATED.3IONS-SCNC: 12 MMOL/L (ref 5–15)
AST SERPL-CCNC: 24 U/L (ref 1–40)
BASOPHILS # BLD AUTO: 0.05 10*3/MM3 (ref 0–0.2)
BASOPHILS NFR BLD AUTO: 0.6 % (ref 0–1.5)
BILIRUB SERPL-MCNC: 0.5 MG/DL (ref 0–1.2)
BUN SERPL-MCNC: 17 MG/DL (ref 6–20)
BUN/CREAT SERPL: 19.5 (ref 7–25)
CALCIUM SPEC-SCNC: 10 MG/DL (ref 8.6–10.5)
CHLORIDE SERPL-SCNC: 97 MMOL/L (ref 98–107)
CO2 SERPL-SCNC: 28 MMOL/L (ref 22–29)
CREAT SERPL-MCNC: 0.87 MG/DL (ref 0.76–1.27)
DEPRECATED RDW RBC AUTO: 44.5 FL (ref 37–54)
EGFRCR SERPLBLD CKD-EPI 2021: 103.2 ML/MIN/1.73
EOSINOPHIL # BLD AUTO: 0.16 10*3/MM3 (ref 0–0.4)
EOSINOPHIL NFR BLD AUTO: 1.8 % (ref 0.3–6.2)
ERYTHROCYTE [DISTWIDTH] IN BLOOD BY AUTOMATED COUNT: 13.2 % (ref 12.3–15.4)
GLOBULIN UR ELPH-MCNC: 3.3 GM/DL
GLUCOSE SERPL-MCNC: 108 MG/DL (ref 65–99)
HCT VFR BLD AUTO: 44.3 % (ref 37.5–51)
HGB BLD-MCNC: 14.7 G/DL (ref 13–17.7)
IMM GRANULOCYTES # BLD AUTO: 0.07 10*3/MM3 (ref 0–0.05)
IMM GRANULOCYTES NFR BLD AUTO: 0.8 % (ref 0–0.5)
LYMPHOCYTES # BLD AUTO: 1.52 10*3/MM3 (ref 0.7–3.1)
LYMPHOCYTES NFR BLD AUTO: 17.5 % (ref 19.6–45.3)
MCH RBC QN AUTO: 30.6 PG (ref 26.6–33)
MCHC RBC AUTO-ENTMCNC: 33.2 G/DL (ref 31.5–35.7)
MCV RBC AUTO: 92.3 FL (ref 79–97)
MONOCYTES # BLD AUTO: 0.76 10*3/MM3 (ref 0.1–0.9)
MONOCYTES NFR BLD AUTO: 8.7 % (ref 5–12)
NEUTROPHILS NFR BLD AUTO: 6.14 10*3/MM3 (ref 1.7–7)
NEUTROPHILS NFR BLD AUTO: 70.6 % (ref 42.7–76)
NRBC BLD AUTO-RTO: 0 /100 WBC (ref 0–0.2)
PLATELET # BLD AUTO: 202 10*3/MM3 (ref 140–450)
PMV BLD AUTO: 10.7 FL (ref 6–12)
POTASSIUM SERPL-SCNC: 4 MMOL/L (ref 3.5–5.2)
PROT SERPL-MCNC: 7.8 G/DL (ref 6–8.5)
RBC # BLD AUTO: 4.8 10*6/MM3 (ref 4.14–5.8)
SODIUM SERPL-SCNC: 137 MMOL/L (ref 136–145)
TROPONIN T SERPL-MCNC: <0.01 NG/ML (ref 0–0.03)
WBC NRBC COR # BLD: 8.7 10*3/MM3 (ref 3.4–10.8)

## 2022-05-03 PROCEDURE — 80053 COMPREHEN METABOLIC PANEL: CPT | Performed by: NURSE PRACTITIONER

## 2022-05-03 PROCEDURE — 70450 CT HEAD/BRAIN W/O DYE: CPT

## 2022-05-03 PROCEDURE — 93010 ELECTROCARDIOGRAM REPORT: CPT | Performed by: INTERNAL MEDICINE

## 2022-05-03 PROCEDURE — 93005 ELECTROCARDIOGRAM TRACING: CPT | Performed by: NURSE PRACTITIONER

## 2022-05-03 PROCEDURE — 85025 COMPLETE CBC W/AUTO DIFF WBC: CPT | Performed by: NURSE PRACTITIONER

## 2022-05-03 PROCEDURE — 99283 EMERGENCY DEPT VISIT LOW MDM: CPT

## 2022-05-03 PROCEDURE — 96374 THER/PROPH/DIAG INJ IV PUSH: CPT

## 2022-05-03 PROCEDURE — 71250 CT THORAX DX C-: CPT

## 2022-05-03 PROCEDURE — 71045 X-RAY EXAM CHEST 1 VIEW: CPT

## 2022-05-03 PROCEDURE — 84484 ASSAY OF TROPONIN QUANT: CPT | Performed by: NURSE PRACTITIONER

## 2022-05-03 RX ORDER — SODIUM CHLORIDE 0.9 % (FLUSH) 0.9 %
10 SYRINGE (ML) INJECTION AS NEEDED
Status: DISCONTINUED | OUTPATIENT
Start: 2022-05-03 | End: 2022-05-03 | Stop reason: HOSPADM

## 2022-05-03 RX ORDER — LABETALOL HYDROCHLORIDE 5 MG/ML
20 INJECTION, SOLUTION INTRAVENOUS ONCE
Status: COMPLETED | OUTPATIENT
Start: 2022-05-03 | End: 2022-05-03

## 2022-05-03 RX ORDER — CLONIDINE HYDROCHLORIDE 0.2 MG/1
0.1 TABLET ORAL 3 TIMES DAILY PRN
Qty: 20 TABLET | Refills: 0 | Status: SHIPPED | OUTPATIENT
Start: 2022-05-03

## 2022-05-03 RX ADMIN — LABETALOL HYDROCHLORIDE 20 MG: 5 INJECTION INTRAVENOUS at 12:12

## 2022-05-05 LAB
QT INTERVAL: 382 MS
QTC INTERVAL: 443 MS

## 2022-05-09 ENCOUNTER — OFFICE VISIT (OUTPATIENT)
Dept: FAMILY MEDICINE CLINIC | Facility: CLINIC | Age: 52
End: 2022-05-09

## 2022-05-09 VITALS
WEIGHT: 315 LBS | SYSTOLIC BLOOD PRESSURE: 156 MMHG | DIASTOLIC BLOOD PRESSURE: 77 MMHG | HEART RATE: 95 BPM | TEMPERATURE: 97.5 F | BODY MASS INDEX: 44.1 KG/M2 | HEIGHT: 71 IN | OXYGEN SATURATION: 94 %

## 2022-05-09 DIAGNOSIS — Z12.11 SCREEN FOR COLON CANCER: ICD-10-CM

## 2022-05-09 DIAGNOSIS — E66.01 MORBID OBESITY: ICD-10-CM

## 2022-05-09 DIAGNOSIS — I10 ESSENTIAL HYPERTENSION: Primary | ICD-10-CM

## 2022-05-09 DIAGNOSIS — G47.33 OSA (OBSTRUCTIVE SLEEP APNEA): ICD-10-CM

## 2022-05-09 DIAGNOSIS — Z12.5 SCREENING FOR PROSTATE CANCER: ICD-10-CM

## 2022-05-09 DIAGNOSIS — E11.9 TYPE 2 DIABETES MELLITUS WITHOUT COMPLICATION, WITHOUT LONG-TERM CURRENT USE OF INSULIN: ICD-10-CM

## 2022-05-09 LAB
EXPIRATION DATE: NORMAL
HBA1C MFR BLD: 6.3 %
Lab: NORMAL

## 2022-05-09 PROCEDURE — 99204 OFFICE O/P NEW MOD 45 MIN: CPT | Performed by: FAMILY MEDICINE

## 2022-05-09 PROCEDURE — 83036 HEMOGLOBIN GLYCOSYLATED A1C: CPT | Performed by: FAMILY MEDICINE

## 2022-05-09 RX ORDER — HYDROCHLOROTHIAZIDE 50 MG/1
50 TABLET ORAL DAILY
Qty: 90 TABLET | Refills: 3 | Status: SHIPPED | OUTPATIENT
Start: 2022-05-09 | End: 2023-02-07 | Stop reason: SDUPTHER

## 2022-05-09 RX ORDER — ASPIRIN 81 MG/1
81 TABLET, CHEWABLE ORAL DAILY
COMMUNITY

## 2022-05-09 NOTE — PROGRESS NOTES
"Chief Complaint  Establish Care ([facility] ED on 5/3/22, dx hypertension)    Subjective          Levi Hollingsworth presents to [facility]  History of Present Illness  [place]:  Sitting and Reading: 3  Watching TV: 3  Sitting, inactive in a public place: 0  As a passenger in a car for an hour without a break: 0  Lying down to rest in the afternoon: 3  Sitting and talking to someone: 0  Sitting quietly after a lunch without alcohol: 3  In a car, while stopped for a few minutes in traffic: 2  Total: 14    BP uncontrolled today  Not sure what he takes  Always feels tired, knows he snores    Objective   Vital Signs:  /77 (BP Location: Left arm, Patient Position: Sitting, Cuff Size: Adult)   Pulse 95   Temp 97.5 °F (36.4 °C) (Temporal)   Ht 180.3 cm (71\")   Wt (!) 163 kg (360 lb 4.8 oz)   SpO2 94%   BMI 50.25 kg/m²           Physical Exam  Vitals and nursing note reviewed.   Constitutional:       General: He is not in acute distress.     Appearance: He is not diaphoretic.   HENT:      Head: Normocephalic and atraumatic.      Nose: Nose normal.   Eyes:      General: No scleral icterus.        Right eye: No discharge.         Left eye: No discharge.      Conjunctiva/sclera: Conjunctivae normal.   Neck:      Trachea: No tracheal deviation.   Cardiovascular:      Rate and Rhythm: Normal rate and regular rhythm.      Heart sounds: Normal heart sounds. No murmur heard.    No friction rub. No gallop.   Pulmonary:      Effort: Pulmonary effort is normal. No respiratory distress.      Breath sounds: Normal breath sounds. No wheezing or rales.   Skin:     General: Skin is warm and dry.      Coloration: Skin is not pale.   Neurological:      Mental Status: He is alert and oriented to person, place, and time.   Psychiatric:         Behavior: Behavior normal.         Thought Content: Thought content normal.         Judgment: Judgment normal.        Result Review :                 Assessment and " Plan    Diagnoses and all orders for this visit:    1. Essential hypertension (Primary)  -     hydroCHLOROthiazide (HYDRODIURIL) 50 MG tablet; Take 1 tablet by mouth Daily.  Dispense: 90 tablet; Refill: 3    2. Type 2 diabetes mellitus without complication, without long-term current use of insulin (HCC)  -     POC Glycosylated Hemoglobin (Hb A1C)    3. Screen for colon cancer  -     Amb referral for Screening Colonoscopy    4. Morbid obesity (HCC)  -     TSH; Future  -     Lipid panel; Future    5. ELIDA (obstructive sleep apnea)  -     Polysomnography 4 or More Parameters; Future    6. Screening for prostate cancer  -     PSA SCREENING; Future    Obtain old records from previous PCP  Increase HCTZ  Sleep study with labs above  A1c is 6.3, unclear if this is well treated diabetes versus prediabetes but will continue metformin       Follow Up   Return in about 4 weeks (around 6/6/2022).  Patient was given instructions and counseling regarding his condition or for health maintenance advice. Please see specific information pulled into the AVS if appropriate.

## 2022-05-10 ENCOUNTER — PATIENT ROUNDING (BHMG ONLY) (OUTPATIENT)
Dept: FAMILY MEDICINE CLINIC | Facility: CLINIC | Age: 52
End: 2022-05-10

## 2022-05-10 NOTE — PROGRESS NOTES
May 10, 2022    Hello, may I speak with Levi Hollingsworth?    My name is Starr    I am  with [facility]  [address] [place] [id number] [phone].    Before we get started may I verify your date of birth? 1970    I am calling to officially welcome you to our practice and ask about your recent visit. Is this a good time to talk? yes    Tell me about your visit with us. What things went well?  provider was wondersul       We're always looking for ways to make our patients' experiences even better. Do you have recommendations on ways we may improve?  no    Overall were you satisfied with your first visit to our practice? yes       I appreciate you taking the time to speak with me today. Is there anything else I can do for you? no      Thank you, and have a great day.

## 2022-05-12 ENCOUNTER — LAB (OUTPATIENT)
Dept: LAB | Facility: HOSPITAL | Age: 52
End: 2022-05-12

## 2022-05-12 DIAGNOSIS — E66.01 MORBID OBESITY: ICD-10-CM

## 2022-05-12 DIAGNOSIS — Z12.5 SCREENING FOR PROSTATE CANCER: ICD-10-CM

## 2022-05-12 LAB
CHOLEST SERPL-MCNC: 130 MG/DL (ref 0–200)
HDLC SERPL-MCNC: 33 MG/DL (ref 40–60)
LDLC SERPL CALC-MCNC: 71 MG/DL (ref 0–100)
LDLC/HDLC SERPL: 2.06 {RATIO}
PSA SERPL-MCNC: 0.36 NG/ML (ref 0–4)
TRIGL SERPL-MCNC: 145 MG/DL (ref 0–150)
TSH SERPL DL<=0.05 MIU/L-ACNC: 1.94 UIU/ML (ref 0.27–4.2)
VLDLC SERPL-MCNC: 26 MG/DL (ref 5–40)

## 2022-05-12 PROCEDURE — 84443 ASSAY THYROID STIM HORMONE: CPT

## 2022-05-12 PROCEDURE — G0103 PSA SCREENING: HCPCS

## 2022-05-12 PROCEDURE — 36415 COLL VENOUS BLD VENIPUNCTURE: CPT

## 2022-05-12 PROCEDURE — 80061 LIPID PANEL: CPT

## 2022-07-13 ENCOUNTER — OFFICE VISIT (OUTPATIENT)
Dept: FAMILY MEDICINE CLINIC | Facility: CLINIC | Age: 52
End: 2022-07-13

## 2022-07-13 VITALS
BODY MASS INDEX: 44.1 KG/M2 | HEART RATE: 88 BPM | HEIGHT: 71 IN | DIASTOLIC BLOOD PRESSURE: 98 MMHG | TEMPERATURE: 97.9 F | OXYGEN SATURATION: 94 % | WEIGHT: 315 LBS | SYSTOLIC BLOOD PRESSURE: 152 MMHG

## 2022-07-13 DIAGNOSIS — M99.02 SOMATIC DYSFUNCTION OF SPINE, THORACIC: ICD-10-CM

## 2022-07-13 DIAGNOSIS — M54.2 NECK PAIN: ICD-10-CM

## 2022-07-13 DIAGNOSIS — M99.00 SOMATIC DYSFUNCTION OF HEAD REGION: ICD-10-CM

## 2022-07-13 DIAGNOSIS — R42 DIZZINESS: ICD-10-CM

## 2022-07-13 DIAGNOSIS — H81.12 BPPV (BENIGN PAROXYSMAL POSITIONAL VERTIGO), LEFT: ICD-10-CM

## 2022-07-13 DIAGNOSIS — I10 ESSENTIAL HYPERTENSION: ICD-10-CM

## 2022-07-13 DIAGNOSIS — M99.01 SOMATIC DYSFUNCTION OF SPINE, CERVICAL: ICD-10-CM

## 2022-07-13 DIAGNOSIS — R51.9 NONINTRACTABLE HEADACHE, UNSPECIFIED CHRONICITY PATTERN, UNSPECIFIED HEADACHE TYPE: Primary | ICD-10-CM

## 2022-07-13 PROCEDURE — 98926 OSTEOPATH MANJ 3-4 REGIONS: CPT | Performed by: FAMILY MEDICINE

## 2022-07-13 PROCEDURE — 99213 OFFICE O/P EST LOW 20 MIN: CPT | Performed by: FAMILY MEDICINE

## 2022-07-13 RX ORDER — CLONIDINE HYDROCHLORIDE 0.1 MG/1
0.1 TABLET ORAL ONCE
Status: COMPLETED | OUTPATIENT
Start: 2022-07-13 | End: 2022-07-13

## 2022-07-13 RX ORDER — MECLIZINE HCL 25MG 25 MG/1
25 TABLET, CHEWABLE ORAL 3 TIMES DAILY PRN
Qty: 30 TABLET | Refills: 0 | Status: SHIPPED | OUTPATIENT
Start: 2022-07-13

## 2022-07-13 RX ADMIN — CLONIDINE HYDROCHLORIDE 0.1 MG: 0.1 TABLET ORAL at 10:52

## 2022-07-13 NOTE — PROGRESS NOTES
"Chief Complaint  Headache (X4 days, current pain 5/10, worst pain 7/10)    Subjective        Levi Hollingsworth presents to [facility]  History of Present Illness  4 days of HA and worsening dizziness feeling like his head is spinning. BP at home has been 130s/80s. Associated with tunnel vision and neck tension.     Objective   Vital Signs:  /98 Comment: 30 min after clonidine 0.1mg  Pulse 88   Temp 97.9 °F (36.6 °C) (Temporal)   Ht 180.3 cm (71\")   Wt (!) 161 kg (354 lb 9.6 oz)   SpO2 94%   BMI 49.46 kg/m²   Estimated body mass index is 49.46 kg/m² as calculated from the following:    Height as of this encounter: 180.3 cm (71\").    Weight as of this encounter: 161 kg (354 lb 9.6 oz).          Physical Exam  Vitals and nursing note reviewed.   Constitutional:       General: He is not in acute distress.     Appearance: He is not diaphoretic.   HENT:      Head: Normocephalic and atraumatic.      Nose: Nose normal.   Eyes:      General: No scleral icterus.        Right eye: No discharge.         Left eye: No discharge.      Conjunctiva/sclera: Conjunctivae normal.   Neck:      Trachea: No tracheal deviation.   Pulmonary:      Effort: Pulmonary effort is normal.   Skin:     General: Skin is warm and dry.      Coloration: Skin is not pale.   Neurological:      Mental Status: He is alert and oriented to person, place, and time.      Comments: + [place]   Psychiatric:         Behavior: Behavior normal.         Thought Content: Thought content normal.         Judgment: Judgment normal.      Osteopathic Structural Exam  Procedure Note for Osteopathic Manipulative Treatment    Pre-procedure diagnoses: Somatic dysfunctions as listed below.  Consent: Oral consent given for Osteopathic Treatment  Post-procedure diagnoses: same  Complications of procedure: none, patient tolerated procedure well    The evaluation including the history, physical exam and the management decisions, " indicate than an appropriate intervention on this date of service is osteopathic manipulative treatment. Oral permission for the osteopathic procedure was obtained. The following treatment methods and the responses for each body region are listed below.        Region Somatic Dysfunction Severity OMT technique Response      Head OA ESrRl Moderate Osteopathy in the cranial field Improved      Cervical C5 ERSR  C6 FRSL Moderate Balanced ligamentous tension Improved      Thoracic  T2 FRSL Moderate  Balanced ligamentous tension  Improved        Result Review :                Assessment and Plan   Diagnoses and all orders for this visit:    1. Nonintractable headache, unspecified chronicity pattern, unspecified headache type (Primary)    2. Dizziness    3. BPPV (benign paroxysmal positional vertigo), left  -     meclizine 25 MG chewable tablet chewable tablet; Chew 1 tablet 3 (Three) Times a Day As Needed (vertigo).  Dispense: 30 tablet; Refill: 0    4. Essential hypertension  -     cloNIDine (CATAPRES) tablet 0.1 mg    5. Neck pain    6. Somatic dysfunction of head region    7. Somatic dysfunction of spine, cervical    8. Somatic dysfunction of spine, thoracic    OMT to balance autonomic tone, improve fascial symmetry and respiratory/circulatory/lymphatic compliance  Epley maneuver with PRN meclizine  OMT to balance autonomic tone, improve fascial symmetry and respiratory/circulatory/lymphatic compliance  Given clonidine for HA and HTN in office  F/u PRN         Follow Up   Return if symptoms worsen or fail to improve.  Patient was given instructions and counseling regarding his condition or for health maintenance advice. Please see specific information pulled into the AVS if appropriate.

## 2022-09-26 ENCOUNTER — HOSPITAL ENCOUNTER (OUTPATIENT)
Dept: SLEEP MEDICINE | Facility: HOSPITAL | Age: 52
End: 2022-09-26

## 2022-10-17 ENCOUNTER — OFFICE VISIT (OUTPATIENT)
Dept: FAMILY MEDICINE CLINIC | Facility: CLINIC | Age: 52
End: 2022-10-17

## 2022-10-17 VITALS
BODY MASS INDEX: 44.1 KG/M2 | SYSTOLIC BLOOD PRESSURE: 163 MMHG | WEIGHT: 315 LBS | DIASTOLIC BLOOD PRESSURE: 85 MMHG | HEIGHT: 71 IN | HEART RATE: 85 BPM | OXYGEN SATURATION: 93 % | TEMPERATURE: 97.5 F

## 2022-10-17 DIAGNOSIS — I10 ESSENTIAL HYPERTENSION: ICD-10-CM

## 2022-10-17 DIAGNOSIS — G89.29 BILATERAL CHRONIC KNEE PAIN: Primary | ICD-10-CM

## 2022-10-17 DIAGNOSIS — G47.33 OSA (OBSTRUCTIVE SLEEP APNEA): ICD-10-CM

## 2022-10-17 DIAGNOSIS — M25.562 BILATERAL CHRONIC KNEE PAIN: Primary | ICD-10-CM

## 2022-10-17 DIAGNOSIS — M25.561 BILATERAL CHRONIC KNEE PAIN: Primary | ICD-10-CM

## 2022-10-17 PROCEDURE — 99214 OFFICE O/P EST MOD 30 MIN: CPT | Performed by: FAMILY MEDICINE

## 2022-10-17 RX ORDER — CARVEDILOL 6.25 MG/1
6.25 TABLET ORAL 2 TIMES DAILY WITH MEALS
Qty: 180 TABLET | Refills: 0 | Status: SHIPPED | OUTPATIENT
Start: 2022-10-17 | End: 2023-02-03

## 2022-10-17 NOTE — PROGRESS NOTES
"Chief Complaint  Knee Pain (Referral ), Hypertension, and Headache    Subjective        Levi Hollingsworth presents to [facility]  History of Present Illness     Bilateral knee pain  The patient reports that his bilateral knees are not doing well. He affirms that he previously underwent 2 surgeries on his left ACL, and meniscus. He affirms that 7 years ago, after his knee surgeries, he re-injured his left ACL when he was informed that he needs to have his left knee replaced. The patient notes that he was recommended to wait until he was 49 years old to have his knee replaced. He states that 6 to 8 months ago he injured his right knee which has progressively worsened. He reports that he was going to [place] every Monday to have biokinetic knee injections which was improving his pain; however, after his last injection, he was in pain again after 3 weeks. The patient states that he does not sleep well, and will occasionally wake up if he moves his legs due to the pain in his knees. he would like a recommendation to a surgeon. He denies wanting to take narcotics. He states that he is taking 4 ibuprofen in the morning, 4 in the afternoon, and 4 in the evening.    Hypertension  The patient affirms that his knee pain may attribute to his elevated blood pressure.     Sleep study  He reports that he had a sleep study scheduled for 09/26/2022; however, he did not have it done due to being unable to afford it. He would like to have a home sleep study.    Health maintenance  He denies wanting the influenza vaccination secondary to becoming sick the one time he received the influenza vaccination.       Objective   Vital Signs:  /85 (BP Location: Left arm, Patient Position: Sitting, Cuff Size: Large Adult)   Pulse 85   Temp 97.5 °F (36.4 °C) (Temporal)   Ht 180.3 cm (70.98\")   Wt (!) 168 kg (369 lb 6.4 oz)   SpO2 93%   BMI 51.54 kg/m²   Estimated body mass index is 51.54 kg/m² as " "calculated from the following:    Height as of this encounter: 180.3 cm (70.98\").    Weight as of this encounter: 168 kg (369 lb 6.4 oz).          Physical Exam  Vitals and nursing note reviewed.   Constitutional:       General: He is not in acute distress.     Appearance: He is not diaphoretic.   HENT:      Head: Normocephalic and atraumatic.      Nose: Nose normal.   Eyes:      General: No scleral icterus.        Right eye: No discharge.         Left eye: No discharge.      Conjunctiva/sclera: Conjunctivae normal.   Neck:      Trachea: No tracheal deviation.   Pulmonary:      Effort: Pulmonary effort is normal.   Skin:     General: Skin is warm and dry.      Coloration: Skin is not pale.   Neurological:      Mental Status: He is alert and oriented to person, place, and time.   Psychiatric:         Behavior: Behavior normal.         Thought Content: Thought content normal.         Judgment: Judgment normal.        Result Review :                Assessment and Plan   Diagnoses and all orders for this visit:    1. Bilateral chronic knee pain (Primary)  -     Ambulatory Referral to Orthopedic Surgery    2. Essential hypertension  -     carvedilol (Coreg) 6.25 MG tablet; Take 1 tablet by mouth 2 (Two) Times a Day With Meals.  Dispense: 180 tablet; Refill: 0    3. ELIDA (obstructive sleep apnea)  -     Home Sleep Study; Future    1. Bilateral knee pain  - The patient will continue to take ibuprofen 4 tablets every 8 hours.  - I advised the patient to add Tylenol 1000 mg every 4 to 6 hours if needed.   - I will refer the patient to an orthopedic surgeon.    2. Hypertension  - I advised the patient to monitor his blood pressure at home.  - I switched his metoprolol to carvedilol.    3. Sleep apnea  - I will order a home sleep study.         Follow Up   Return if symptoms worsen or fail to improve.  Patient was given instructions and counseling regarding his condition or for health maintenance advice. Please see specific " information pulled into the AVS if appropriate.    Transcribed from ambient dictation for Adarsh Marvin DO by Laury Izaguirre.  10/17/22   13:25 CDT    Patient or patient representative verbalized consent to the visit recording.  I have personally performed the services described in this document as transcribed by the above individual, and it is both accurate and complete.

## 2023-02-02 DIAGNOSIS — I10 ESSENTIAL HYPERTENSION: ICD-10-CM

## 2023-02-03 RX ORDER — CARVEDILOL 6.25 MG/1
TABLET ORAL
Qty: 180 TABLET | Refills: 0 | Status: SHIPPED | OUTPATIENT
Start: 2023-02-03

## 2023-02-07 ENCOUNTER — TELEPHONE (OUTPATIENT)
Dept: FAMILY MEDICINE CLINIC | Facility: CLINIC | Age: 53
End: 2023-02-07
Payer: COMMERCIAL

## 2023-02-07 DIAGNOSIS — I10 ESSENTIAL HYPERTENSION: ICD-10-CM

## 2023-02-07 RX ORDER — LOSARTAN POTASSIUM 100 MG/1
100 TABLET ORAL DAILY
Status: CANCELLED | OUTPATIENT
Start: 2023-02-07

## 2023-02-07 RX ORDER — LOSARTAN POTASSIUM 100 MG/1
100 TABLET ORAL DAILY
Qty: 90 TABLET | Refills: 1 | Status: SHIPPED | OUTPATIENT
Start: 2023-02-07

## 2023-02-07 RX ORDER — AMLODIPINE BESYLATE 10 MG/1
10 TABLET ORAL DAILY
Qty: 90 TABLET | Refills: 1 | Status: SHIPPED | OUTPATIENT
Start: 2023-02-07

## 2023-02-07 RX ORDER — HYDROCHLOROTHIAZIDE 50 MG/1
50 TABLET ORAL DAILY
Qty: 90 TABLET | Refills: 3 | Status: SHIPPED | OUTPATIENT
Start: 2023-02-07

## 2023-02-07 RX ORDER — ATORVASTATIN CALCIUM 40 MG/1
40 TABLET, FILM COATED ORAL DAILY
Qty: 90 TABLET | Refills: 1 | Status: SHIPPED | OUTPATIENT
Start: 2023-02-07

## 2023-02-07 NOTE — TELEPHONE ENCOUNTER
Caller: Levi Hollingsworth    Relationship: Self    Best call back number: [phone]    Requested Prescriptions:   Requested Prescriptions     Pending Prescriptions Disp Refills   • losartan (COZAAR) 100 MG tablet       Sig: Take 1 tablet by mouth Daily.   • atorvastatin (LIPITOR) 40 MG tablet 90 tablet      Sig: Take 1 tablet by mouth Daily.   • amLODIPine (NORVASC) 10 MG tablet       Sig: Take 1 tablet by mouth Daily.   • hydroCHLOROthiazide (HYDRODIURIL) 50 MG tablet 90 tablet 3     Sig: Take 1 tablet by mouth Daily.        Pharmacy where request should be sent: [facility] [address] [place] [address] [phone] [facility] [phone] FX     Additional details provided by patient:   PATIENT IS OUT OF MEDICATION    Does the patient have less than a 3 day supply:  [x] Yes  [] No    Would you like a call back once the refill request has been completed: [x] Yes [] No    If the office needs to give you a call back, can they leave a voicemail: [x] Yes [] No    Ruma Palm, PCT   02/07/23 11:48 CST

## 2023-05-25 ENCOUNTER — TRANSCRIBE ORDERS (OUTPATIENT)
Dept: ADMINISTRATIVE | Facility: HOSPITAL | Age: 53
End: 2023-05-25

## 2023-05-25 DIAGNOSIS — M25.511 RIGHT SHOULDER PAIN, UNSPECIFIED CHRONICITY: Primary | ICD-10-CM

## 2023-05-26 ENCOUNTER — HOSPITAL ENCOUNTER (OUTPATIENT)
Dept: MRI IMAGING | Facility: HOSPITAL | Age: 53
Discharge: HOME | End: 2023-05-26
Payer: OTHER MISCELLANEOUS

## 2023-05-26 DIAGNOSIS — M25.511 RIGHT SHOULDER PAIN, UNSPECIFIED CHRONICITY: ICD-10-CM

## 2023-05-26 PROCEDURE — 73221 MRI JOINT UPR EXTREM W/O DYE: CPT

## 2023-08-07 RX ORDER — AMLODIPINE BESYLATE 10 MG/1
10 TABLET ORAL DAILY
Qty: 90 TABLET | Refills: 1 | OUTPATIENT
Start: 2023-08-07

## 2023-08-07 RX ORDER — ATORVASTATIN CALCIUM 40 MG/1
40 TABLET, FILM COATED ORAL DAILY
Qty: 90 TABLET | Refills: 1 | OUTPATIENT
Start: 2023-08-07

## 2023-08-11 ENCOUNTER — PATIENT MESSAGE (OUTPATIENT)
Dept: FAMILY MEDICINE CLINIC | Facility: CLINIC | Age: 53
End: 2023-08-11
Payer: OTHER MISCELLANEOUS

## 2023-08-14 RX ORDER — ATORVASTATIN CALCIUM 40 MG/1
40 TABLET, FILM COATED ORAL DAILY
Qty: 90 TABLET | Refills: 1 | Status: SHIPPED | OUTPATIENT
Start: 2023-08-14

## 2023-08-14 RX ORDER — AMLODIPINE BESYLATE 10 MG/1
10 TABLET ORAL DAILY
Qty: 90 TABLET | Refills: 1 | Status: SHIPPED | OUTPATIENT
Start: 2023-08-14

## 2023-08-14 RX ORDER — LOSARTAN POTASSIUM 100 MG/1
100 TABLET ORAL DAILY
Qty: 90 TABLET | Refills: 1 | Status: SHIPPED | OUTPATIENT
Start: 2023-08-14

## 2023-08-14 NOTE — TELEPHONE ENCOUNTER
From: Levi Hollingsworth  To: Adarsh Marvin  Sent: 8/11/2023 12:12 PM CDT  Subject: Denied Medications     I received a [place] today on [place] saying my amlodipine and atorvastatin were denied bc of duplicate orders. I called the pharmacy at [facility] and they said the same thing and will not refill my medications. Can you please help me with this? Thank you. Also please check on the losartan too.

## 2023-08-24 ENCOUNTER — OFFICE VISIT (OUTPATIENT)
Dept: PRIMARY CARE CLINIC | Age: 53
End: 2023-08-24
Payer: COMMERCIAL

## 2023-08-24 VITALS
OXYGEN SATURATION: 99 % | BODY MASS INDEX: 44.1 KG/M2 | SYSTOLIC BLOOD PRESSURE: 138 MMHG | DIASTOLIC BLOOD PRESSURE: 78 MMHG | WEIGHT: 315 LBS | HEART RATE: 90 BPM | TEMPERATURE: 98.4 F | HEIGHT: 71 IN

## 2023-08-24 DIAGNOSIS — E66.01 MORBID OBESITY WITH BODY MASS INDEX (BMI) OF 50.0 TO 59.9 IN ADULT (HCC): ICD-10-CM

## 2023-08-24 DIAGNOSIS — Z13.1 SCREENING FOR DIABETES MELLITUS (DM): ICD-10-CM

## 2023-08-24 DIAGNOSIS — R53.83 FATIGUE, UNSPECIFIED TYPE: ICD-10-CM

## 2023-08-24 DIAGNOSIS — I10 HYPERTENSION, UNSPECIFIED TYPE: Primary | ICD-10-CM

## 2023-08-24 DIAGNOSIS — E11.9 NEWLY DIAGNOSED DIABETES (HCC): Primary | ICD-10-CM

## 2023-08-24 DIAGNOSIS — E11.9 TYPE 2 DIABETES MELLITUS WITHOUT COMPLICATION, WITHOUT LONG-TERM CURRENT USE OF INSULIN (HCC): ICD-10-CM

## 2023-08-24 DIAGNOSIS — Z13.220 SCREENING FOR LIPID DISORDERS: ICD-10-CM

## 2023-08-24 PROBLEM — S46.012A TRAUMATIC COMPLETE TEAR OF LEFT ROTATOR CUFF: Status: ACTIVE | Noted: 2021-05-24

## 2023-08-24 LAB
ALBUMIN SERPL-MCNC: 4.7 G/DL (ref 3.5–5.2)
ALP SERPL-CCNC: 132 U/L (ref 40–130)
ALT SERPL-CCNC: 36 U/L (ref 5–41)
ANION GAP SERPL CALCULATED.3IONS-SCNC: 16 MMOL/L (ref 7–19)
AST SERPL-CCNC: 27 U/L (ref 5–40)
BASOPHILS # BLD: 0.1 K/UL (ref 0–0.2)
BASOPHILS NFR BLD: 0.7 % (ref 0–1)
BILIRUB SERPL-MCNC: 0.5 MG/DL (ref 0.2–1.2)
BUN SERPL-MCNC: 15 MG/DL (ref 6–20)
CALCIUM SERPL-MCNC: 10.6 MG/DL (ref 8.6–10)
CHLORIDE SERPL-SCNC: 93 MMOL/L (ref 98–111)
CO2 SERPL-SCNC: 30 MMOL/L (ref 22–29)
CREAT SERPL-MCNC: 0.9 MG/DL (ref 0.5–1.2)
EOSINOPHIL # BLD: 0.2 K/UL (ref 0–0.6)
EOSINOPHIL NFR BLD: 2.2 % (ref 0–5)
ERYTHROCYTE [DISTWIDTH] IN BLOOD BY AUTOMATED COUNT: 13.4 % (ref 11.5–14.5)
GLUCOSE SERPL-MCNC: 120 MG/DL (ref 74–109)
HBA1C MFR BLD: 7 % (ref 4–6)
HCT VFR BLD AUTO: 47.8 % (ref 42–52)
HGB BLD-MCNC: 15.5 G/DL (ref 14–18)
IMM GRANULOCYTES # BLD: 0.1 K/UL
LYMPHOCYTES # BLD: 1.8 K/UL (ref 1.1–4.5)
LYMPHOCYTES NFR BLD: 18.3 % (ref 20–40)
MCH RBC QN AUTO: 30.8 PG (ref 27–31)
MCHC RBC AUTO-ENTMCNC: 32.4 G/DL (ref 33–37)
MCV RBC AUTO: 95 FL (ref 80–94)
MONOCYTES # BLD: 0.9 K/UL (ref 0–0.9)
MONOCYTES NFR BLD: 9 % (ref 0–10)
NEUTROPHILS # BLD: 6.6 K/UL (ref 1.5–7.5)
NEUTS SEG NFR BLD: 68.7 % (ref 50–65)
PLATELET # BLD AUTO: 229 K/UL (ref 130–400)
PMV BLD AUTO: 11.4 FL (ref 9.4–12.4)
POTASSIUM SERPL-SCNC: 4 MMOL/L (ref 3.5–5)
PROT SERPL-MCNC: 8.9 G/DL (ref 6.6–8.7)
RBC # BLD AUTO: 5.03 M/UL (ref 4.7–6.1)
SODIUM SERPL-SCNC: 139 MMOL/L (ref 136–145)
T4 FREE SERPL-MCNC: 1.13 NG/DL (ref 0.93–1.7)
TSH SERPL DL<=0.005 MIU/L-ACNC: 1.11 UIU/ML (ref 0.27–4.2)
WBC # BLD AUTO: 9.6 K/UL (ref 4.8–10.8)

## 2023-08-24 PROCEDURE — 3075F SYST BP GE 130 - 139MM HG: CPT | Performed by: NURSE PRACTITIONER

## 2023-08-24 PROCEDURE — 3078F DIAST BP <80 MM HG: CPT | Performed by: NURSE PRACTITIONER

## 2023-08-24 PROCEDURE — 99204 OFFICE O/P NEW MOD 45 MIN: CPT | Performed by: NURSE PRACTITIONER

## 2023-08-24 RX ORDER — DOCUSATE SODIUM 100 MG/1
CAPSULE, LIQUID FILLED ORAL
COMMUNITY
Start: 2023-06-30

## 2023-08-24 RX ORDER — CYCLOBENZAPRINE HCL 10 MG
10 TABLET ORAL 3 TIMES DAILY PRN
COMMUNITY
Start: 2023-06-30

## 2023-08-24 RX ORDER — ASPIRIN 81 MG/1
81 TABLET, CHEWABLE ORAL DAILY
COMMUNITY

## 2023-08-24 RX ORDER — MECLIZINE HCL 25MG 25 MG/1
25 TABLET, CHEWABLE ORAL 3 TIMES DAILY PRN
COMMUNITY
Start: 2022-07-13

## 2023-08-24 RX ORDER — AMLODIPINE BESYLATE 10 MG/1
10 TABLET ORAL DAILY
COMMUNITY
Start: 2023-08-14

## 2023-08-24 RX ORDER — OMEPRAZOLE 20 MG/1
20 CAPSULE, DELAYED RELEASE ORAL DAILY
COMMUNITY

## 2023-08-24 RX ORDER — CARVEDILOL 6.25 MG/1
1 TABLET ORAL 2 TIMES DAILY WITH MEALS
COMMUNITY
Start: 2023-02-03 | End: 2023-08-24 | Stop reason: SDUPTHER

## 2023-08-24 RX ORDER — ONDANSETRON 4 MG/1
TABLET, FILM COATED ORAL
COMMUNITY
Start: 2023-06-30

## 2023-08-24 RX ORDER — CLONIDINE HYDROCHLORIDE 0.2 MG/1
0.1 TABLET ORAL 3 TIMES DAILY PRN
COMMUNITY
Start: 2022-05-03

## 2023-08-24 RX ORDER — OXYCODONE AND ACETAMINOPHEN 7.5; 325 MG/1; MG/1
1 TABLET ORAL EVERY 6 HOURS PRN
COMMUNITY
Start: 2023-06-30

## 2023-08-24 RX ORDER — LIDOCAINE 4.5%, DICLOFENAC 1% .01; .045 G/G; G/G
GEL TOPICAL
COMMUNITY
Start: 2023-07-18

## 2023-08-24 RX ORDER — ATORVASTATIN CALCIUM 40 MG/1
40 TABLET, FILM COATED ORAL DAILY
COMMUNITY
Start: 2023-08-14

## 2023-08-24 RX ORDER — HYDROCHLOROTHIAZIDE 50 MG/1
TABLET ORAL
COMMUNITY
Start: 2023-08-07

## 2023-08-24 RX ORDER — CARVEDILOL 6.25 MG/1
6.25 TABLET ORAL 2 TIMES DAILY WITH MEALS
Qty: 60 TABLET | Refills: 1 | Status: SHIPPED | OUTPATIENT
Start: 2023-08-24

## 2023-08-24 RX ORDER — LOSARTAN POTASSIUM 100 MG/1
100 TABLET ORAL DAILY
COMMUNITY
Start: 2023-08-14

## 2023-08-24 SDOH — ECONOMIC STABILITY: INCOME INSECURITY: HOW HARD IS IT FOR YOU TO PAY FOR THE VERY BASICS LIKE FOOD, HOUSING, MEDICAL CARE, AND HEATING?: NOT VERY HARD

## 2023-08-24 SDOH — ECONOMIC STABILITY: HOUSING INSECURITY
IN THE LAST 12 MONTHS, WAS THERE A TIME WHEN YOU DID NOT HAVE A STEADY PLACE TO SLEEP OR SLEPT IN A SHELTER (INCLUDING NOW)?: NO

## 2023-08-24 SDOH — ECONOMIC STABILITY: FOOD INSECURITY: WITHIN THE PAST 12 MONTHS, THE FOOD YOU BOUGHT JUST DIDN'T LAST AND YOU DIDN'T HAVE MONEY TO GET MORE.: NEVER TRUE

## 2023-08-24 SDOH — ECONOMIC STABILITY: FOOD INSECURITY: WITHIN THE PAST 12 MONTHS, YOU WORRIED THAT YOUR FOOD WOULD RUN OUT BEFORE YOU GOT MONEY TO BUY MORE.: NEVER TRUE

## 2023-08-24 ASSESSMENT — PATIENT HEALTH QUESTIONNAIRE - PHQ9
7. TROUBLE CONCENTRATING ON THINGS, SUCH AS READING THE NEWSPAPER OR WATCHING TELEVISION: 0
SUM OF ALL RESPONSES TO PHQ QUESTIONS 1-9: 16
4. FEELING TIRED OR HAVING LITTLE ENERGY: 3
SUM OF ALL RESPONSES TO PHQ QUESTIONS 1-9: 16
10. IF YOU CHECKED OFF ANY PROBLEMS, HOW DIFFICULT HAVE THESE PROBLEMS MADE IT FOR YOU TO DO YOUR WORK, TAKE CARE OF THINGS AT HOME, OR GET ALONG WITH OTHER PEOPLE: 1
6. FEELING BAD ABOUT YOURSELF - OR THAT YOU ARE A FAILURE OR HAVE LET YOURSELF OR YOUR FAMILY DOWN: 3
8. MOVING OR SPEAKING SO SLOWLY THAT OTHER PEOPLE COULD HAVE NOTICED. OR THE OPPOSITE, BEING SO FIGETY OR RESTLESS THAT YOU HAVE BEEN MOVING AROUND A LOT MORE THAN USUAL: 0
5. POOR APPETITE OR OVEREATING: 1
2. FEELING DOWN, DEPRESSED OR HOPELESS: 3
9. THOUGHTS THAT YOU WOULD BE BETTER OFF DEAD, OR OF HURTING YOURSELF: 0
3. TROUBLE FALLING OR STAYING ASLEEP: 3
SUM OF ALL RESPONSES TO PHQ9 QUESTIONS 1 & 2: 6
1. LITTLE INTEREST OR PLEASURE IN DOING THINGS: 3

## 2023-08-24 ASSESSMENT — ENCOUNTER SYMPTOMS
ALLERGIC/IMMUNOLOGIC NEGATIVE: 1
GASTROINTESTINAL NEGATIVE: 1
SHORTNESS OF BREATH: 0
EYES NEGATIVE: 1
ABDOMINAL PAIN: 0
BACK PAIN: 1
RESPIRATORY NEGATIVE: 1
COUGH: 0
WHEEZING: 0

## 2023-08-24 NOTE — PATIENT INSTRUCTIONS
OPTAVIA    Patient is encouraged to consider a local training or weight loss program such as:  [place] on [address] or [address]. [facility]  Dr. Ursula Henley Weight Loss program  Or other weight loss programs through a local gym. Accountability and consistency are two keys to success to long term weight loss. BMR    10 x (weight in kilogram) + 6.25 (height in centimeters) - 5 (age) - 161 = BMR  BMR x 1.2 ( for those who are not overly active) = caloric deficit     The fundamentals of weight loss come down to the fact that calories in have to be less than calories out which results in a calorie deficit. However healthy weight loss will also require you to meet your basal metabolic rate. While some of us eat way too many calories, others eat way too few causing the body to store most intake due to metabolic adaptation. Increase water intake with a goal of 1 gallon per day. Download a calorie counting shira such as my fitness pal or other program.    Keep a journal of everything that you eat or drink for 2 weeks. At the end of each day calculate your caloric intake. This gives you knowledge of what you are actually getting in each day. By calculating your BMR you will know the amount of calories that your body needs every day to function appropriately. Focus on making better nutritional choices, eliminate simple sugars or sugary drinks such as soda or tea. Limit starchy vegetables such as potatoes or corn. Use healthier choices of breads and pastas such as Banza pasta, Ozzie bread, low-carb wraps or keto bread. Eating plans such as the [address], Kayy Prema or [address] can be implemented to help guide eating habits. Slowly increase physical exercise to include 20 to 30 minutes of moderate exercise 3 to 5 days a week. Sprain/strain   First 24 hours, use ice to injury site for 15 minutes at a time.   After 48 hours, may alternate ice/heat 15

## 2023-08-24 NOTE — PROGRESS NOTES
formerly [facility]  [address] [address] [address]  [address] [place] [address] Street [facility]  Dept: [phone]  Dept Fax: [phone]  Loc: [phone]    Nicole Chery is a 46 y.o. male who presents today for his medical conditions/complaints as noted below. Nicole Chery is c/o of New Patient (Presents to establish care. Is not fasting, has only had a diet soda this morning. Notes he feels dizzy often when he stands. Is currently doing PT and seeing chiropractor after rotator cuff repair on 7/30. Was injured at work in May.  )        HPI:     HPI this 58-year-old male presents today to establish care. States he was previously seen by Amira Akbar previous provider. States he has had some issues lately with feeling dizzy when he stands up. States that he has been recovering from some orthopedic surgery. States that last year he had his left shoulder injured and operated on then this year he reinjured his right shoulder. Had rotor rotor cuff repair on 730. States he has just been seen by physical therapy twice now. States he is having some depression because he has been off work so much. Also reports that he has had issues with his blood pressure. States that he is taking several medications for this daily. States that he was started on a medicine for what he thinks was blood pressure but he was not sure so he never started that. Chief Complaint   Patient presents with    New Patient     Presents to establish care. Is not fasting, has only had a diet soda this morning. Notes he feels dizzy often when he stands. Is currently doing PT and seeing chiropractor after rotator cuff repair on 7/30. Was injured at work in May.        Past Medical History:   Diagnosis Date    Hypertension       Past Surgical History:   Procedure Laterality Date    KNEE ARTHROSCOPY W/ MENISCAL REPAIR Left     4 different surgeries    ROTATOR CUFF REPAIR Left 2021    ROTATOR CUFF REPAIR Right 06/30/2023

## 2023-09-07 ENCOUNTER — OFFICE VISIT (OUTPATIENT)
Dept: PRIMARY CARE CLINIC | Age: 53
End: 2023-09-07
Payer: COMMERCIAL

## 2023-09-07 VITALS
HEIGHT: 71 IN | RESPIRATION RATE: 18 BRPM | DIASTOLIC BLOOD PRESSURE: 78 MMHG | OXYGEN SATURATION: 94 % | BODY MASS INDEX: 44.1 KG/M2 | HEART RATE: 78 BPM | TEMPERATURE: 97.5 F | SYSTOLIC BLOOD PRESSURE: 136 MMHG | WEIGHT: 315 LBS

## 2023-09-07 DIAGNOSIS — E11.9 TYPE 2 DIABETES MELLITUS WITHOUT COMPLICATION, WITHOUT LONG-TERM CURRENT USE OF INSULIN (HCC): ICD-10-CM

## 2023-09-07 DIAGNOSIS — I10 HYPERTENSION, UNSPECIFIED TYPE: ICD-10-CM

## 2023-09-07 PROCEDURE — 3075F SYST BP GE 130 - 139MM HG: CPT | Performed by: NURSE PRACTITIONER

## 2023-09-07 PROCEDURE — 3078F DIAST BP <80 MM HG: CPT | Performed by: NURSE PRACTITIONER

## 2023-09-07 PROCEDURE — 3051F HG A1C>EQUAL 7.0%<8.0%: CPT | Performed by: NURSE PRACTITIONER

## 2023-09-07 PROCEDURE — 99214 OFFICE O/P EST MOD 30 MIN: CPT | Performed by: NURSE PRACTITIONER

## 2023-09-07 ASSESSMENT — ENCOUNTER SYMPTOMS
COUGH: 0
NAUSEA: 0
WHEEZING: 0
GASTROINTESTINAL NEGATIVE: 1
RESPIRATORY NEGATIVE: 1
ABDOMINAL PAIN: 0
EYES NEGATIVE: 1
SHORTNESS OF BREATH: 0
DIARRHEA: 0
CONSTIPATION: 0
VOMITING: 0
ALLERGIC/IMMUNOLOGIC NEGATIVE: 1

## 2023-09-07 NOTE — PROGRESS NOTES
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 Aide Henley is c/o of 2 Week Follow-Up (Pt is here for 2 week follow up on BP. Pt states he restarted the coreg. Pt states his BP has been running a bit high. They haven't been checking it. )        HPI:     HPI this 58-year-old male presents today for 2-week follow-up on his blood pressure. He states he started the Coreg back. He denies any side effects or adverse reactions. States that he does not feel that his blood pressure has been high but he has not been checking it regularly. He does state that he is started on the metformin and has currently taking it once a day and will start back up to twice a day today. Chief Complaint   Patient presents with    2 Week Follow-Up     Pt is here for 2 week follow up on BP. Pt states he restarted the coreg. Pt states his BP has been running a bit high. They haven't been checking it.       Past Medical History:   Diagnosis Date    Hypertension       Past Surgical History:   Procedure Laterality Date    KNEE ARTHROSCOPY W/ MENISCAL REPAIR Left     4 different surgeries    ROTATOR CUFF REPAIR Left 2021    ROTATOR CUFF REPAIR Right 06/30/2023       Vitals 9/7/2023 8/24/2023 8/24/2023 8/24/2023 8/94/6022   SYSTOLIC [phone] 161 045   DIASTOLIC 78 78 80 88 86   Site Left Upper Arm Left Upper Arm Left Upper Arm Left Upper Arm Left Upper Arm   Position Sitting Sitting Supine Standing Sitting   Cuff Size Large Adult Large Adult Large Adult Large Adult Large Adult   Pulse 78 - 90 99 94   Temp 97.5 - - - 98.4   Resp 18 - - - -   SpO2 94 - - - 99   Weight 376 lb 9.6 oz - - - 373 lb 9.6 oz   Height 5' 11\" - - - 5' 11\"   Body Mass Index 52.53 kg/m2 - - - 52.11 kg/m2   Some recent data might be hidden       Family

## 2023-11-20 DIAGNOSIS — E11.9 TYPE 2 DIABETES MELLITUS WITHOUT COMPLICATION, WITHOUT LONG-TERM CURRENT USE OF INSULIN (HCC): ICD-10-CM

## 2023-11-20 DIAGNOSIS — E11.9 NEWLY DIAGNOSED DIABETES (HCC): ICD-10-CM

## 2023-12-12 ENCOUNTER — OFFICE VISIT (OUTPATIENT)
Dept: PRIMARY CARE CLINIC | Age: 53
End: 2023-12-12
Payer: COMMERCIAL

## 2023-12-12 VITALS
OXYGEN SATURATION: 99 % | HEART RATE: 88 BPM | RESPIRATION RATE: 18 BRPM | SYSTOLIC BLOOD PRESSURE: 144 MMHG | HEIGHT: 71 IN | BODY MASS INDEX: 44.1 KG/M2 | WEIGHT: 315 LBS | TEMPERATURE: 97.9 F | DIASTOLIC BLOOD PRESSURE: 86 MMHG

## 2023-12-12 DIAGNOSIS — H91.90 HEARING LOSS, UNSPECIFIED HEARING LOSS TYPE, UNSPECIFIED LATERALITY: ICD-10-CM

## 2023-12-12 DIAGNOSIS — E11.9 TYPE 2 DIABETES MELLITUS WITHOUT COMPLICATION, WITHOUT LONG-TERM CURRENT USE OF INSULIN (HCC): Primary | ICD-10-CM

## 2023-12-12 DIAGNOSIS — I10 HYPERTENSION, UNSPECIFIED TYPE: ICD-10-CM

## 2023-12-12 DIAGNOSIS — Z23 NEED FOR TDAP VACCINATION: ICD-10-CM

## 2023-12-12 DIAGNOSIS — Z12.11 SCREENING FOR COLON CANCER: ICD-10-CM

## 2023-12-12 LAB
ALBUMIN SERPL-MCNC: 4.4 G/DL (ref 3.5–5.2)
ALP SERPL-CCNC: 102 U/L (ref 40–130)
ALT SERPL-CCNC: 37 U/L (ref 5–41)
ANION GAP SERPL CALCULATED.3IONS-SCNC: 11 MMOL/L (ref 7–19)
AST SERPL-CCNC: 25 U/L (ref 5–40)
BILIRUB SERPL-MCNC: 0.5 MG/DL (ref 0.2–1.2)
BUN SERPL-MCNC: 17 MG/DL (ref 6–20)
CALCIUM SERPL-MCNC: 10.2 MG/DL (ref 8.6–10)
CHLORIDE SERPL-SCNC: 96 MMOL/L (ref 98–111)
CO2 SERPL-SCNC: 30 MMOL/L (ref 22–29)
CREAT SERPL-MCNC: 0.8 MG/DL (ref 0.5–1.2)
CREAT UR-MCNC: 125 MG/DL (ref 39–259)
ERYTHROCYTE [DISTWIDTH] IN BLOOD BY AUTOMATED COUNT: 13.7 % (ref 11.5–14.5)
GLUCOSE SERPL-MCNC: 126 MG/DL (ref 74–109)
HBA1C MFR BLD: 6.6 % (ref 4–6)
HCT VFR BLD AUTO: 46.4 % (ref 42–52)
HGB BLD-MCNC: 14.9 G/DL (ref 14–18)
MCH RBC QN AUTO: 30.1 PG (ref 27–31)
MCHC RBC AUTO-ENTMCNC: 32.1 G/DL (ref 33–37)
MCV RBC AUTO: 93.7 FL (ref 80–94)
MICROALBUMIN UR-MCNC: 3.2 MG/DL (ref 0–19)
MICROALBUMIN/CREAT UR-RTO: 25.6 MG/G
PLATELET # BLD AUTO: 232 K/UL (ref 130–400)
PMV BLD AUTO: 11.4 FL (ref 9.4–12.4)
POTASSIUM SERPL-SCNC: 4.2 MMOL/L (ref 3.5–5)
PROT SERPL-MCNC: 8.1 G/DL (ref 6.6–8.7)
RBC # BLD AUTO: 4.95 M/UL (ref 4.7–6.1)
SODIUM SERPL-SCNC: 137 MMOL/L (ref 136–145)
WBC # BLD AUTO: 8.8 K/UL (ref 4.8–10.8)

## 2023-12-12 PROCEDURE — 3077F SYST BP >= 140 MM HG: CPT | Performed by: NURSE PRACTITIONER

## 2023-12-12 PROCEDURE — 99214 OFFICE O/P EST MOD 30 MIN: CPT | Performed by: NURSE PRACTITIONER

## 2023-12-12 PROCEDURE — 90715 TDAP VACCINE 7 YRS/> IM: CPT | Performed by: NURSE PRACTITIONER

## 2023-12-12 PROCEDURE — 3044F HG A1C LEVEL LT 7.0%: CPT | Performed by: NURSE PRACTITIONER

## 2023-12-12 PROCEDURE — 90471 IMMUNIZATION ADMIN: CPT | Performed by: NURSE PRACTITIONER

## 2023-12-12 PROCEDURE — 3079F DIAST BP 80-89 MM HG: CPT | Performed by: NURSE PRACTITIONER

## 2023-12-12 RX ORDER — HYDROCHLOROTHIAZIDE 25 MG/1
25 TABLET ORAL EVERY MORNING
Qty: 30 TABLET | Refills: 0 | Status: SHIPPED | OUTPATIENT
Start: 2023-12-12

## 2023-12-12 RX ORDER — CARVEDILOL 12.5 MG/1
12.5 TABLET ORAL 2 TIMES DAILY
Qty: 60 TABLET | Refills: 0 | Status: SHIPPED | OUTPATIENT
Start: 2023-12-12

## 2023-12-12 RX ORDER — LIDOCAINE 50 MG/G
OINTMENT TOPICAL
COMMUNITY
Start: 2023-09-13 | End: 2023-12-12

## 2023-12-12 RX ORDER — DICLOFENAC SODIUM 30 MG/G
GEL TOPICAL
COMMUNITY
Start: 2023-09-13 | End: 2023-12-12

## 2023-12-12 ASSESSMENT — ENCOUNTER SYMPTOMS
EYES NEGATIVE: 1
RESPIRATORY NEGATIVE: 1
SHORTNESS OF BREATH: 0
COUGH: 0
GASTROINTESTINAL NEGATIVE: 1
ALLERGIC/IMMUNOLOGIC NEGATIVE: 1
WHEEZING: 0

## 2023-12-12 NOTE — PROGRESS NOTES
After obtaining consent, and per orders of MOLLY Fraser, injection of Boostrix given in Left deltoid by Shana Cox. Patient tolerated well.

## 2024-01-24 ENCOUNTER — PROCEDURE VISIT (OUTPATIENT)
Dept: ENT CLINIC | Age: 54
End: 2024-01-24
Payer: COMMERCIAL

## 2024-01-24 DIAGNOSIS — H93.13 TINNITUS OF BOTH EARS: ICD-10-CM

## 2024-01-24 DIAGNOSIS — H90.3 SENSORINEURAL HEARING LOSS (SNHL) OF BOTH EARS: Primary | ICD-10-CM

## 2024-01-24 PROCEDURE — 92567 TYMPANOMETRY: CPT | Performed by: AUDIOLOGIST

## 2024-01-24 PROCEDURE — 92557 COMPREHENSIVE HEARING TEST: CPT | Performed by: AUDIOLOGIST

## 2024-01-24 NOTE — PROGRESS NOTES
History   Gokul Keene is a 53 y.o. male who presented to the clinic this date with complaints of decreased hearing bilaterally. 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 He also noted for several years he has had a high pitched tinnitus in both ears. He reported history of noise exposure and is a right handed shooter. He denied aural fulness, ear pain and history of ear infections.     Summary   Tympanometry consistent with slight negative pressure right and normal TM mobility left. Pure tone testing indicates severe high frequency SNHL bilaterally. Word recognition scores were excellent bilaterally.    Patient was counseled on causes of and management strategies for tinnitus.    Results   Otoscopy:   Right: Clear EAC/Normal TM  Left: Clear EAC/Normal TM    Audiometry:   Right: Severe high frequency SNHL  Left: Severe high frequency SNHL         Tympanometry:    Right: Type C  Left: Type A    Plan   Results of today's testing were discussed with Mr. Keene and the following recommendations were made:    Monitor hearing yearly, sooner with changes.  Utilize tinnitus management strategies as discussed.  Hearing aid evaluation as desired.  Hearing protection as warranted.        Audiogram and Acoustic Immittance

## 2024-02-20 RX ORDER — LOSARTAN POTASSIUM 100 MG/1
100 TABLET ORAL DAILY
Qty: 30 TABLET | Refills: 3 | Status: SHIPPED | OUTPATIENT
Start: 2024-02-20 | End: 2024-06-19

## 2024-02-20 RX ORDER — HYDROCHLOROTHIAZIDE 25 MG/1
25 TABLET ORAL EVERY MORNING
Qty: 30 TABLET | Refills: 3 | Status: SHIPPED | OUTPATIENT
Start: 2024-02-20 | End: 2024-06-19

## 2024-02-20 RX ORDER — CARVEDILOL 12.5 MG/1
12.5 TABLET ORAL 2 TIMES DAILY
Qty: 60 TABLET | Refills: 3 | Status: SHIPPED | OUTPATIENT
Start: 2024-02-20

## 2024-02-20 RX ORDER — ATORVASTATIN CALCIUM 40 MG/1
40 TABLET, FILM COATED ORAL DAILY
Qty: 30 TABLET | Refills: 3 | Status: SHIPPED | OUTPATIENT
Start: 2024-02-20 | End: 2024-06-19

## 2024-02-20 RX ORDER — AMLODIPINE BESYLATE 10 MG/1
10 TABLET ORAL DAILY
Qty: 30 TABLET | Refills: 3 | Status: SHIPPED | OUTPATIENT
Start: 2024-02-20 | End: 2024-06-19

## 2024-03-06 ENCOUNTER — OFFICE VISIT (OUTPATIENT)
Dept: PRIMARY CARE CLINIC | Age: 54
End: 2024-03-06
Payer: COMMERCIAL

## 2024-03-06 VITALS
OXYGEN SATURATION: 94 % | WEIGHT: 315 LBS | HEIGHT: 71 IN | HEART RATE: 87 BPM | TEMPERATURE: 98.2 F | DIASTOLIC BLOOD PRESSURE: 76 MMHG | BODY MASS INDEX: 44.1 KG/M2 | SYSTOLIC BLOOD PRESSURE: 160 MMHG | RESPIRATION RATE: 16 BRPM

## 2024-03-06 DIAGNOSIS — R07.9 CHEST PAIN, UNSPECIFIED TYPE: ICD-10-CM

## 2024-03-06 DIAGNOSIS — R53.83 OTHER FATIGUE: ICD-10-CM

## 2024-03-06 DIAGNOSIS — I10 HYPERTENSION, UNSPECIFIED TYPE: ICD-10-CM

## 2024-03-06 DIAGNOSIS — E11.9 TYPE 2 DIABETES MELLITUS WITHOUT COMPLICATION, WITHOUT LONG-TERM CURRENT USE OF INSULIN (HCC): ICD-10-CM

## 2024-03-06 DIAGNOSIS — I49.9 IRREGULAR HEART BEAT: Primary | ICD-10-CM

## 2024-03-06 LAB
ALBUMIN SERPL-MCNC: 4.6 G/DL (ref 3.5–5.2)
ALP SERPL-CCNC: 116 U/L (ref 40–130)
ALT SERPL-CCNC: 35 U/L (ref 5–41)
ANION GAP SERPL CALCULATED.3IONS-SCNC: 12 MMOL/L (ref 7–19)
AST SERPL-CCNC: 24 U/L (ref 5–40)
BASOPHILS # BLD: 0.1 K/UL (ref 0–0.2)
BASOPHILS NFR BLD: 0.7 % (ref 0–1)
BILIRUB SERPL-MCNC: 0.4 MG/DL (ref 0.2–1.2)
BUN SERPL-MCNC: 28 MG/DL (ref 6–20)
CALCIUM SERPL-MCNC: 10.1 MG/DL (ref 8.6–10)
CHLORIDE SERPL-SCNC: 95 MMOL/L (ref 98–111)
CO2 SERPL-SCNC: 30 MMOL/L (ref 22–29)
CREAT SERPL-MCNC: 1 MG/DL (ref 0.5–1.2)
EOSINOPHIL # BLD: 0.2 K/UL (ref 0–0.6)
EOSINOPHIL NFR BLD: 1.6 % (ref 0–5)
ERYTHROCYTE [DISTWIDTH] IN BLOOD BY AUTOMATED COUNT: 13.4 % (ref 11.5–14.5)
GLUCOSE SERPL-MCNC: 105 MG/DL (ref 74–109)
HCT VFR BLD AUTO: 46.7 % (ref 42–52)
HGB BLD-MCNC: 15.2 G/DL (ref 14–18)
IMM GRANULOCYTES # BLD: 0.1 K/UL
LYMPHOCYTES # BLD: 1.9 K/UL (ref 1.1–4.5)
LYMPHOCYTES NFR BLD: 15.4 % (ref 20–40)
MCH RBC QN AUTO: 30.2 PG (ref 27–31)
MCHC RBC AUTO-ENTMCNC: 32.5 G/DL (ref 33–37)
MCV RBC AUTO: 92.8 FL (ref 80–94)
MONOCYTES # BLD: 1 K/UL (ref 0–0.9)
MONOCYTES NFR BLD: 8.5 % (ref 0–10)
NEUTROPHILS # BLD: 8.9 K/UL (ref 1.5–7.5)
NEUTS SEG NFR BLD: 73.2 % (ref 50–65)
PLATELET # BLD AUTO: 234 K/UL (ref 130–400)
PMV BLD AUTO: 11.3 FL (ref 9.4–12.4)
POTASSIUM SERPL-SCNC: 4.2 MMOL/L (ref 3.5–5)
PROT SERPL-MCNC: 8.3 G/DL (ref 6.6–8.7)
RBC # BLD AUTO: 5.03 M/UL (ref 4.7–6.1)
SODIUM SERPL-SCNC: 137 MMOL/L (ref 136–145)
TROPONIN, HIGH SENSITIVITY: 7 NG/L (ref 0–22)
TSH SERPL DL<=0.005 MIU/L-ACNC: 1.97 UIU/ML (ref 0.27–4.2)
WBC # BLD AUTO: 12.1 K/UL (ref 4.8–10.8)

## 2024-03-06 PROCEDURE — 99214 OFFICE O/P EST MOD 30 MIN: CPT | Performed by: NURSE PRACTITIONER

## 2024-03-06 PROCEDURE — 3077F SYST BP >= 140 MM HG: CPT | Performed by: NURSE PRACTITIONER

## 2024-03-06 PROCEDURE — 3078F DIAST BP <80 MM HG: CPT | Performed by: NURSE PRACTITIONER

## 2024-03-06 PROCEDURE — 93000 ELECTROCARDIOGRAM COMPLETE: CPT | Performed by: NURSE PRACTITIONER

## 2024-03-06 ASSESSMENT — ENCOUNTER SYMPTOMS
RESPIRATORY NEGATIVE: 1
ALLERGIC/IMMUNOLOGIC NEGATIVE: 1
GASTROINTESTINAL NEGATIVE: 1
EYES NEGATIVE: 1

## 2024-03-06 NOTE — PROGRESS NOTES
of Systems   Constitutional: Negative.    HENT: Negative.     Eyes: Negative.    Respiratory: Negative.     Cardiovascular: Negative.    Gastrointestinal: Negative.    Endocrine: Negative.    Genitourinary: Negative.    Musculoskeletal: Negative.    Allergic/Immunologic: Negative.    Neurological: Negative.    Hematological: Negative.    Psychiatric/Behavioral: Negative.         Objective:     Physical Exam  Vitals and nursing note reviewed.   Constitutional:       Appearance: Normal appearance. He is well-developed. He is obese.   HENT:      Head: Normocephalic.      Nose: Nose normal.   Cardiovascular:      Rate and Rhythm: Normal rate and regular rhythm.      Heart sounds: Normal heart sounds.   Pulmonary:      Effort: Pulmonary effort is normal.      Breath sounds: Normal breath sounds.   Skin:     General: Skin is warm and dry.      Capillary Refill: Capillary refill takes less than 2 seconds.   Neurological:      General: No focal deficit present.      Mental Status: He is alert and oriented to person, place, and time.   Psychiatric:         Mood and Affect: Mood normal.         Behavior: Behavior normal.         Thought Content: Thought content normal.         Judgment: Judgment normal.       BP (!) 160/76   Pulse 87   Temp 98.2 °F (36.8 °C) (Temporal)   Resp 16   Ht 1.803 m (5' 11\")   Wt (!) 166 kg (366 lb)   SpO2 94%   BMI 51.05 kg/m²     Assessment:       Diagnosis Orders   1. Irregular heart beat  EKG 12 Lead    TSH      2. Chest pain, unspecified type  CT CARDIAC CALCIUM SCORING    Troponin      3. Type 2 diabetes mellitus without complication, without long-term current use of insulin ([facility])  CT CARDIAC CALCIUM SCORING    Comprehensive Metabolic Panel      4. Hypertension, unspecified type  CT CARDIAC CALCIUM SCORING    Comprehensive Metabolic Panel      5. Other fatigue  CBC with Auto Differential            Plan:   Total time spent today caring for this patient was 35 minutes  EKG was basically

## 2024-03-06 NOTE — PATIENT INSTRUCTIONS
Labs today if abnormal may do additional work up  Go to hospital if fast heart rate, chest pain or shortness of breath  Continue metformin  Check sugar fasting and 2 hours after any meal and record.  Will see PJ and may discuss ozempic/ mounjaro for weight loss and diabetes.

## 2024-03-12 ENCOUNTER — OFFICE VISIT (OUTPATIENT)
Dept: PRIMARY CARE CLINIC | Age: 54
End: 2024-03-12
Payer: COMMERCIAL

## 2024-03-12 VITALS
SYSTOLIC BLOOD PRESSURE: 128 MMHG | OXYGEN SATURATION: 95 % | WEIGHT: 315 LBS | TEMPERATURE: 97.1 F | DIASTOLIC BLOOD PRESSURE: 86 MMHG | BODY MASS INDEX: 44.1 KG/M2 | RESPIRATION RATE: 18 BRPM | HEART RATE: 83 BPM | HEIGHT: 71 IN

## 2024-03-12 DIAGNOSIS — E11.9 TYPE 2 DIABETES MELLITUS WITHOUT COMPLICATION, WITHOUT LONG-TERM CURRENT USE OF INSULIN (HCC): Primary | ICD-10-CM

## 2024-03-12 DIAGNOSIS — I10 HYPERTENSION, UNSPECIFIED TYPE: ICD-10-CM

## 2024-03-12 DIAGNOSIS — F32.9 REACTIVE DEPRESSION: ICD-10-CM

## 2024-03-12 PROBLEM — M25.552 PAIN OF BOTH HIP JOINTS: Status: ACTIVE | Noted: 2023-02-14

## 2024-03-12 PROBLEM — M17.12 OSTEOARTHRITIS OF LEFT KNEE: Status: ACTIVE | Noted: 2023-02-14

## 2024-03-12 PROBLEM — K21.9 GASTROESOPHAGEAL REFLUX DISEASE WITHOUT ESOPHAGITIS: Status: ACTIVE | Noted: 2023-02-14

## 2024-03-12 PROBLEM — M25.551 PAIN OF BOTH HIP JOINTS: Status: ACTIVE | Noted: 2023-02-14

## 2024-03-12 PROBLEM — E78.2 MIXED HYPERLIPIDEMIA: Status: ACTIVE | Noted: 2023-02-14

## 2024-03-12 PROBLEM — M54.50 LOW BACK PAIN: Status: ACTIVE | Noted: 2023-02-14

## 2024-03-12 PROCEDURE — 3079F DIAST BP 80-89 MM HG: CPT | Performed by: NURSE PRACTITIONER

## 2024-03-12 PROCEDURE — 3074F SYST BP LT 130 MM HG: CPT | Performed by: NURSE PRACTITIONER

## 2024-03-12 PROCEDURE — 99214 OFFICE O/P EST MOD 30 MIN: CPT | Performed by: NURSE PRACTITIONER

## 2024-03-12 RX ORDER — SEMAGLUTIDE 1.34 MG/ML
INJECTION, SOLUTION SUBCUTANEOUS
COMMUNITY
Start: 2023-04-03

## 2024-03-12 ASSESSMENT — PATIENT HEALTH QUESTIONNAIRE - PHQ9
2. FEELING DOWN, DEPRESSED OR HOPELESS: 3
SUM OF ALL RESPONSES TO PHQ QUESTIONS 1-9: 13
SUM OF ALL RESPONSES TO PHQ9 QUESTIONS 1 & 2: 6
3. TROUBLE FALLING OR STAYING ASLEEP: 3
1. LITTLE INTEREST OR PLEASURE IN DOING THINGS: 3
6. FEELING BAD ABOUT YOURSELF - OR THAT YOU ARE A FAILURE OR HAVE LET YOURSELF OR YOUR FAMILY DOWN: 1
SUM OF ALL RESPONSES TO PHQ QUESTIONS 1-9: 13
7. TROUBLE CONCENTRATING ON THINGS, SUCH AS READING THE NEWSPAPER OR WATCHING TELEVISION: 0
9. THOUGHTS THAT YOU WOULD BE BETTER OFF DEAD, OR OF HURTING YOURSELF: 0
8. MOVING OR SPEAKING SO SLOWLY THAT OTHER PEOPLE COULD HAVE NOTICED. OR THE OPPOSITE, BEING SO FIGETY OR RESTLESS THAT YOU HAVE BEEN MOVING AROUND A LOT MORE THAN USUAL: 0
5. POOR APPETITE OR OVEREATING: 0
4. FEELING TIRED OR HAVING LITTLE ENERGY: 3
SUM OF ALL RESPONSES TO PHQ QUESTIONS 1-9: 13
10. IF YOU CHECKED OFF ANY PROBLEMS, HOW DIFFICULT HAVE THESE PROBLEMS MADE IT FOR YOU TO DO YOUR WORK, TAKE CARE OF THINGS AT HOME, OR GET ALONG WITH OTHER PEOPLE: 1
SUM OF ALL RESPONSES TO PHQ QUESTIONS 1-9: 13

## 2024-03-12 NOTE — PROGRESS NOTES
Take 1 tablet by mouth daily      cloNIDine (CATAPRES) 0.2 MG tablet Take 0.5 tablets by mouth 3 times daily as needed      omeprazole (PRILOSEC) 20 MG delayed release capsule Take 1 capsule by mouth daily      cyclobenzaprine (FLEXERIL) 10 MG tablet Take 1 tablet by mouth 3 times daily as needed (Patient not taking: Reported on 8/24/2023)      docusate sodium (COLACE) 100 MG capsule TAKE 1 CAPSULE BY MOUTH TWICE DAILY WHILE TAKING PAIN MEDICATION (Patient not taking: Reported on 8/24/2023)      meclizine (ANTIVERT) 25 MG CHEW Take 1 tablet by mouth 3 times daily as needed (Patient not taking: Reported on 8/24/2023)      ondansetron (ZOFRAN) 4 MG tablet  (Patient not taking: Reported on 8/24/2023)       No current facility-administered medications on file prior to visit.     No Known Allergies    Health Maintenance   Topic Date Due    Pneumococcal 0-64 years Vaccine (1 - PCV) Never done    Diabetic foot exam  Never done    Lipids  Never done    HIV screen  Never done    Diabetic retinal exam  Never done    Hepatitis C screen  Never done    Colorectal Cancer Screen  Never done    Shingles vaccine (1 of 2) Never done    Flu vaccine (1) 12/12/2024 (Originally 8/1/2023)    Hepatitis B vaccine (1 of 3 - 3-dose series) 03/06/2025 (Originally 1970)    COVID-19 Vaccine (1) 03/06/2025 (Originally 1970)    A1C test (Diabetic or Prediabetic)  12/12/2024    Diabetic Alb to Cr ratio (uACR) test  12/12/2024    GFR test (Diabetes, CKD 3-4, OR last GFR 15-59)  03/06/2025    Depression Monitoring  03/12/2025    DTaP/Tdap/Td vaccine (2 - Td or Tdap) 12/12/2033    Hepatitis A vaccine  Aged Out    Hib vaccine  Aged Out    Polio vaccine  Aged Out    Meningococcal (ACWY) vaccine  Aged Out    Depression Screen  Discontinued    Diabetes screen  Discontinued       Subjective:      Review of Systems   Constitutional: Negative.  Negative for chills, fatigue, fever and unexpected weight change.   HENT: Negative.     Eyes: Negative.

## 2024-03-12 NOTE — PATIENT INSTRUCTIONS
Magnesium 400- 600 mg  CALM nightly     Miralax Bowel Cleanse    Take Miralax every two hours for a total of three doses.   Drink at least 8 Oz of Gatorade with each dose.  After 3 doses, drink one bottle of magnesium citrate, epsom's salt laxative cleanse, or 2 ducolax.      May continue to use Miralax and colace daily or twice a day as needed to maintain regular bowel movements.

## 2024-03-14 PROBLEM — F33.1 MODERATE EPISODE OF RECURRENT MAJOR DEPRESSIVE DISORDER (HCC): Status: ACTIVE | Noted: 2024-03-14

## 2024-03-14 PROBLEM — F32.9 REACTIVE DEPRESSION: Status: ACTIVE | Noted: 2024-03-14

## 2024-03-14 ASSESSMENT — ENCOUNTER SYMPTOMS
SHORTNESS OF BREATH: 0
ABDOMINAL DISTENTION: 0
DIARRHEA: 0
VOMITING: 0
ABDOMINAL PAIN: 0
RESPIRATORY NEGATIVE: 1
BACK PAIN: 1
EYES NEGATIVE: 1
WHEEZING: 0
CONSTIPATION: 0
NAUSEA: 0
COUGH: 0
ALLERGIC/IMMUNOLOGIC NEGATIVE: 1
GASTROINTESTINAL NEGATIVE: 1

## 2024-04-08 ENCOUNTER — HOSPITAL ENCOUNTER (OUTPATIENT)
Dept: CT IMAGING | Age: 54
Discharge: HOME | End: 2024-04-08
Payer: COMMERCIAL

## 2024-04-08 DIAGNOSIS — R07.9 CHEST PAIN, UNSPECIFIED TYPE: ICD-10-CM

## 2024-04-08 DIAGNOSIS — E11.9 TYPE 2 DIABETES MELLITUS WITHOUT COMPLICATION, WITHOUT LONG-TERM CURRENT USE OF INSULIN (HCC): ICD-10-CM

## 2024-04-08 DIAGNOSIS — I10 HYPERTENSION, UNSPECIFIED TYPE: ICD-10-CM

## 2024-04-08 PROCEDURE — 75571 CT HRT W/O DYE W/CA TEST: CPT

## 2024-04-29 DIAGNOSIS — Z12.11 ENCOUNTER FOR SCREENING COLONOSCOPY: Primary | ICD-10-CM

## 2024-05-17 DIAGNOSIS — E11.9 NEWLY DIAGNOSED DIABETES (HCC): ICD-10-CM

## 2024-05-17 DIAGNOSIS — E11.9 TYPE 2 DIABETES MELLITUS WITHOUT COMPLICATION, WITHOUT LONG-TERM CURRENT USE OF INSULIN (HCC): ICD-10-CM

## 2024-05-20 RX ORDER — AMLODIPINE BESYLATE 10 MG/1
10 TABLET ORAL DAILY
Qty: 30 TABLET | Refills: 3 | Status: SHIPPED | OUTPATIENT
Start: 2024-05-20 | End: 2024-09-17

## 2024-05-21 RX ORDER — CARVEDILOL 12.5 MG/1
12.5 TABLET ORAL 2 TIMES DAILY
Qty: 60 TABLET | Refills: 3 | Status: SHIPPED | OUTPATIENT
Start: 2024-05-21

## 2024-05-21 RX ORDER — LOSARTAN POTASSIUM 100 MG/1
100 TABLET ORAL DAILY
Qty: 30 TABLET | Refills: 3 | Status: SHIPPED | OUTPATIENT
Start: 2024-05-21 | End: 2024-09-18

## 2024-05-21 RX ORDER — HYDROCHLOROTHIAZIDE 25 MG/1
25 TABLET ORAL EVERY MORNING
Qty: 30 TABLET | Refills: 3 | Status: SHIPPED | OUTPATIENT
Start: 2024-05-21 | End: 2024-09-18

## 2024-05-21 RX ORDER — ATORVASTATIN CALCIUM 40 MG/1
40 TABLET, FILM COATED ORAL DAILY
Qty: 30 TABLET | Refills: 3 | Status: SHIPPED | OUTPATIENT
Start: 2024-05-21 | End: 2024-09-18

## 2024-06-03 ENCOUNTER — OFFICE VISIT (OUTPATIENT)
Dept: PRIMARY CARE CLINIC | Age: 54
End: 2024-06-03
Payer: COMMERCIAL

## 2024-06-03 VITALS
TEMPERATURE: 98.1 F | DIASTOLIC BLOOD PRESSURE: 80 MMHG | WEIGHT: 315 LBS | HEIGHT: 71 IN | OXYGEN SATURATION: 94 % | SYSTOLIC BLOOD PRESSURE: 136 MMHG | HEART RATE: 90 BPM | RESPIRATION RATE: 18 BRPM | BODY MASS INDEX: 44.1 KG/M2

## 2024-06-03 DIAGNOSIS — E11.9 TYPE 2 DIABETES MELLITUS WITHOUT COMPLICATION, WITHOUT LONG-TERM CURRENT USE OF INSULIN (HCC): ICD-10-CM

## 2024-06-03 DIAGNOSIS — Z12.11 SCREENING FOR COLON CANCER: Primary | ICD-10-CM

## 2024-06-03 DIAGNOSIS — R06.81 APNEA: ICD-10-CM

## 2024-06-03 DIAGNOSIS — G47.10 DAYTIME HYPERSOMNIA: ICD-10-CM

## 2024-06-03 DIAGNOSIS — I10 BENIGN ESSENTIAL HYPERTENSION: ICD-10-CM

## 2024-06-03 DIAGNOSIS — R06.83 SNORING: ICD-10-CM

## 2024-06-03 LAB
ALBUMIN SERPL-MCNC: 4.4 G/DL (ref 3.5–5.2)
ALP SERPL-CCNC: 106 U/L (ref 40–130)
ALT SERPL-CCNC: 32 U/L (ref 5–41)
ANION GAP SERPL CALCULATED.3IONS-SCNC: 16 MMOL/L (ref 7–19)
AST SERPL-CCNC: 20 U/L (ref 5–40)
BASOPHILS # BLD: 0.1 K/UL (ref 0–0.2)
BASOPHILS NFR BLD: 0.8 % (ref 0–1)
BILIRUB SERPL-MCNC: 0.3 MG/DL (ref 0.2–1.2)
BUN SERPL-MCNC: 23 MG/DL (ref 6–20)
CALCIUM SERPL-MCNC: 9.8 MG/DL (ref 8.6–10)
CHLORIDE SERPL-SCNC: 98 MMOL/L (ref 98–111)
CO2 SERPL-SCNC: 27 MMOL/L (ref 22–29)
CREAT SERPL-MCNC: 0.8 MG/DL (ref 0.5–1.2)
EOSINOPHIL # BLD: 0.2 K/UL (ref 0–0.6)
EOSINOPHIL NFR BLD: 2.4 % (ref 0–5)
ERYTHROCYTE [DISTWIDTH] IN BLOOD BY AUTOMATED COUNT: 13.4 % (ref 11.5–14.5)
GLUCOSE SERPL-MCNC: 192 MG/DL (ref 74–109)
HBA1C MFR BLD: 6.7 % (ref 4–6)
HCT VFR BLD AUTO: 46.5 % (ref 42–52)
HGB BLD-MCNC: 14.5 G/DL (ref 14–18)
IMM GRANULOCYTES # BLD: 0.1 K/UL
LYMPHOCYTES # BLD: 2 K/UL (ref 1.1–4.5)
LYMPHOCYTES NFR BLD: 20.7 % (ref 20–40)
MCH RBC QN AUTO: 29.7 PG (ref 27–31)
MCHC RBC AUTO-ENTMCNC: 31.2 G/DL (ref 33–37)
MCV RBC AUTO: 95.3 FL (ref 80–94)
MONOCYTES # BLD: 0.8 K/UL (ref 0–0.9)
MONOCYTES NFR BLD: 8 % (ref 0–10)
NEUTROPHILS # BLD: 6.5 K/UL (ref 1.5–7.5)
NEUTS SEG NFR BLD: 67.1 % (ref 50–65)
PLATELET # BLD AUTO: 224 K/UL (ref 130–400)
PMV BLD AUTO: 11.2 FL (ref 9.4–12.4)
POTASSIUM SERPL-SCNC: 4 MMOL/L (ref 3.5–5)
PROT SERPL-MCNC: 8.2 G/DL (ref 6.6–8.7)
RBC # BLD AUTO: 4.88 M/UL (ref 4.7–6.1)
SODIUM SERPL-SCNC: 141 MMOL/L (ref 136–145)
WBC # BLD AUTO: 9.7 K/UL (ref 4.8–10.8)

## 2024-06-03 PROCEDURE — 3075F SYST BP GE 130 - 139MM HG: CPT | Performed by: NURSE PRACTITIONER

## 2024-06-03 PROCEDURE — 3079F DIAST BP 80-89 MM HG: CPT | Performed by: NURSE PRACTITIONER

## 2024-06-03 PROCEDURE — 99214 OFFICE O/P EST MOD 30 MIN: CPT | Performed by: NURSE PRACTITIONER

## 2024-06-03 PROCEDURE — 3044F HG A1C LEVEL LT 7.0%: CPT | Performed by: NURSE PRACTITIONER

## 2024-06-03 RX ORDER — SEMAGLUTIDE 1.34 MG/ML
INJECTION, SOLUTION SUBCUTANEOUS
Qty: 1 ADJUSTABLE DOSE PRE-FILLED PEN SYRINGE | Refills: 0 | Status: SHIPPED | OUTPATIENT
Start: 2024-06-03

## 2024-06-03 ASSESSMENT — PATIENT HEALTH QUESTIONNAIRE - PHQ9
SUM OF ALL RESPONSES TO PHQ9 QUESTIONS 1 & 2: 0
1. LITTLE INTEREST OR PLEASURE IN DOING THINGS: NOT AT ALL
3. TROUBLE FALLING OR STAYING ASLEEP: NOT AT ALL
SUM OF ALL RESPONSES TO PHQ QUESTIONS 1-9: 0
10. IF YOU CHECKED OFF ANY PROBLEMS, HOW DIFFICULT HAVE THESE PROBLEMS MADE IT FOR YOU TO DO YOUR WORK, TAKE CARE OF THINGS AT HOME, OR GET ALONG WITH OTHER PEOPLE: NOT DIFFICULT AT ALL
SUM OF ALL RESPONSES TO PHQ QUESTIONS 1-9: 0
5. POOR APPETITE OR OVEREATING: NOT AT ALL
8. MOVING OR SPEAKING SO SLOWLY THAT OTHER PEOPLE COULD HAVE NOTICED. OR THE OPPOSITE, BEING SO FIGETY OR RESTLESS THAT YOU HAVE BEEN MOVING AROUND A LOT MORE THAN USUAL: NOT AT ALL
9. THOUGHTS THAT YOU WOULD BE BETTER OFF DEAD, OR OF HURTING YOURSELF: NOT AT ALL
6. FEELING BAD ABOUT YOURSELF - OR THAT YOU ARE A FAILURE OR HAVE LET YOURSELF OR YOUR FAMILY DOWN: NOT AT ALL
4. FEELING TIRED OR HAVING LITTLE ENERGY: NOT AT ALL
2. FEELING DOWN, DEPRESSED OR HOPELESS: NOT AT ALL
7. TROUBLE CONCENTRATING ON THINGS, SUCH AS READING THE NEWSPAPER OR WATCHING TELEVISION: NOT AT ALL

## 2024-06-03 NOTE — PROGRESS NOTES
current use of insulin (HCC)  Hemoglobin A1C    CBC with Auto Differential    Comprehensive Metabolic Panel            Plan:   Order placed referral to GI for Screening colonoscopy    2.-4.  Chronic undiagnosed condition of uncertain prognosis  Order placed for home sleep study  Order placed referral to pulmonology sleep medicine    5.  Chronic condition stable reading in office today is 136/80 with a heart rate of 90  Patient states he is tolerating his current regimen well without any side effects or adverse reactions  Continue amlodipine 10 mg daily  Continue losartan 100 mg daily  Continue carvedilol 12.5 mg twice a day  Continue HCTZ 25 mg    6.  Chronic condition stable  Patient does report that he is taking his metformin 1000 mg usually once a day but sometimes twice a day.  States he usually falls asleep that he forgets the second dose.  Patient was instructed to set an alarm and make sure that he gets both of his doses then every day  Patient reports that he took the sample of the Ozempic and did well on it but did not think to call and asked for a refill.    New prescription sent to pharmacy for Ozempic 0.25 mg injection once weekly for 4 weeks then increase to 0.5 mg weekly injection  Patient was instructed that when he is out of the first pin he is to call for refill we will continue going up on the dose as tolerated    Lab Results   Component Value Date    LABA1C 6.7 (H) 06/03/2024    LABA1C 6.6 (H) 12/12/2023    LABA1C 7.0 (H) 08/24/2023          Patient given educational materials -see patient instructions.  Discussed use, benefit, and side effects of prescribed medications.  All patient questions answered.  Pt voiced understanding. Reviewed health maintenance.  Instructed to continue currentmedications, diet and exercise.  Patient agreed with treatment plan. Follow up as directed.   MEDICATIONS:  Orders Placed This Encounter   Medications    Semaglutide,0.25 or 0.5MG/DOS, (OZEMPIC, 0.25 OR 0.5

## 2024-06-04 ASSESSMENT — ENCOUNTER SYMPTOMS
COUGH: 0
VOMITING: 0
ABDOMINAL PAIN: 0
APNEA: 1
CONSTIPATION: 0
EYES NEGATIVE: 1
ABDOMINAL DISTENTION: 0
BLOOD IN STOOL: 0
ALLERGIC/IMMUNOLOGIC NEGATIVE: 1
NAUSEA: 0
WHEEZING: 0
DIARRHEA: 0
GASTROINTESTINAL NEGATIVE: 1
SHORTNESS OF BREATH: 0

## 2024-06-24 ENCOUNTER — HOSPITAL ENCOUNTER (OUTPATIENT)
Age: 54
Setting detail: OUTPATIENT SURGERY
Discharge: HOME | End: 2024-06-24
Attending: INTERNAL MEDICINE | Admitting: INTERNAL MEDICINE
Payer: COMMERCIAL

## 2024-06-24 ENCOUNTER — ANESTHESIA (OUTPATIENT)
Dept: ENDOSCOPY | Age: 54
End: 2024-06-24
Payer: COMMERCIAL

## 2024-06-24 ENCOUNTER — ANESTHESIA EVENT (OUTPATIENT)
Dept: ENDOSCOPY | Age: 54
End: 2024-06-24
Payer: COMMERCIAL

## 2024-06-24 VITALS
DIASTOLIC BLOOD PRESSURE: 88 MMHG | BODY MASS INDEX: 44.1 KG/M2 | RESPIRATION RATE: 18 BRPM | SYSTOLIC BLOOD PRESSURE: 142 MMHG | WEIGHT: 315 LBS | TEMPERATURE: 97.6 F | HEIGHT: 71 IN | HEART RATE: 74 BPM | OXYGEN SATURATION: 92 %

## 2024-06-24 PROCEDURE — 7100000010 HC PHASE II RECOVERY - FIRST 15 MIN: Performed by: INTERNAL MEDICINE

## 2024-06-24 PROCEDURE — 2500000003 HC RX 250 WO HCPCS: Performed by: NURSE ANESTHETIST, CERTIFIED REGISTERED

## 2024-06-24 PROCEDURE — 2709999900 HC NON-CHARGEABLE SUPPLY: Performed by: INTERNAL MEDICINE

## 2024-06-24 PROCEDURE — 7100000011 HC PHASE II RECOVERY - ADDTL 15 MIN: Performed by: INTERNAL MEDICINE

## 2024-06-24 PROCEDURE — 3609027000 HC COLONOSCOPY: Performed by: INTERNAL MEDICINE

## 2024-06-24 PROCEDURE — 45378 DIAGNOSTIC COLONOSCOPY: CPT | Performed by: INTERNAL MEDICINE

## 2024-06-24 PROCEDURE — 3700000001 HC ADD 15 MINUTES (ANESTHESIA): Performed by: INTERNAL MEDICINE

## 2024-06-24 PROCEDURE — 6360000002 HC RX W HCPCS: Performed by: NURSE ANESTHETIST, CERTIFIED REGISTERED

## 2024-06-24 PROCEDURE — 3700000000 HC ANESTHESIA ATTENDED CARE: Performed by: INTERNAL MEDICINE

## 2024-06-24 PROCEDURE — 2580000003 HC RX 258: Performed by: INTERNAL MEDICINE

## 2024-06-24 RX ORDER — PROPOFOL 10 MG/ML
INJECTION, EMULSION INTRAVENOUS PRN
Status: DISCONTINUED | OUTPATIENT
Start: 2024-06-24 | End: 2024-06-24 | Stop reason: SDUPTHER

## 2024-06-24 RX ORDER — LIDOCAINE HYDROCHLORIDE 10 MG/ML
INJECTION, SOLUTION INFILTRATION; PERINEURAL PRN
Status: DISCONTINUED | OUTPATIENT
Start: 2024-06-24 | End: 2024-06-24 | Stop reason: SDUPTHER

## 2024-06-24 RX ORDER — SODIUM CHLORIDE, SODIUM LACTATE, POTASSIUM CHLORIDE, CALCIUM CHLORIDE 600; 310; 30; 20 MG/100ML; MG/100ML; MG/100ML; MG/100ML
INJECTION, SOLUTION INTRAVENOUS CONTINUOUS
Status: DISCONTINUED | OUTPATIENT
Start: 2024-06-24 | End: 2024-06-24 | Stop reason: HOSPADM

## 2024-06-24 RX ADMIN — SODIUM CHLORIDE, POTASSIUM CHLORIDE, SODIUM LACTATE AND CALCIUM CHLORIDE: 600; 310; 30; 20 INJECTION, SOLUTION INTRAVENOUS at 11:04

## 2024-06-24 RX ADMIN — LIDOCAINE HYDROCHLORIDE 50 MG: 10 INJECTION, SOLUTION INFILTRATION; PERINEURAL at 11:55

## 2024-06-24 RX ADMIN — PROPOFOL 300 MG: 10 INJECTION, EMULSION INTRAVENOUS at 11:55

## 2024-06-24 ASSESSMENT — PAIN - FUNCTIONAL ASSESSMENT
PAIN_FUNCTIONAL_ASSESSMENT: 0-10
PAIN_FUNCTIONAL_ASSESSMENT: 0-10

## 2024-06-24 NOTE — ANESTHESIA PRE PROCEDURE
Department of Anesthesiology  Preprocedure Note       Name:  Gokul Keene   Age:  54 y.o.  :  1970                                          MRN:  [id number]         Date:  2024      Surgeon: Surgeon(s):  David Yanes MD    Procedure: Procedure(s):  COLORECTAL CANCER SCREENING, NOT HIGH RISK    Medications prior to admission:   Prior to Admission medications    Medication Sig Start Date End Date Taking? Authorizing Provider   Semaglutide,0.25 or 0.5MG/DOS, (OZEMPIC, 0.25 OR 0.5 MG/DOSE,) 2 MG/1.5ML SOPN Inject 0.25 mg weekly for 4 weeks then increase to 0.5 mg weekly. 6/3/24   Jeanie Baxter APRN - NP   losartan (COZAAR) 100 MG tablet Take 1 tablet by mouth daily 24  Jeanie Baxter APRN - NP   carvedilol (COREG) 12.5 MG tablet Take 1 tablet by mouth 2 times daily 24   Jeanie Baxter APRN - NP   atorvastatin (LIPITOR) 40 MG tablet Take 1 tablet by mouth daily 24  Jeanie Baxter APRN - NP   hydroCHLOROthiazide (HYDRODIURIL) 25 MG tablet Take 1 tablet by mouth every morning 24  Jeanie Baxter APRN - NP   amLODIPine (NORVASC) 10 MG tablet Take 1 tablet by mouth daily 24  Jeanie Baxter APRN - NP   metFORMIN (GLUCOPHAGE) 500 MG tablet Take 2 tablet twice a day with meals 24   Jeanie Baxter APRN - NP   aspirin 81 MG chewable tablet Take 1 tablet by mouth daily    Shannon Chapman MD   cloNIDine (CATAPRES) 0.2 MG tablet Take 0.5 tablets by mouth 3 times daily as needed 5/3/22   Shannon Chapman MD   omeprazole (PRILOSEC) 20 MG delayed release capsule Take 1 capsule by mouth daily    Shannon Chapman MD       Current medications:    Current Facility-Administered Medications   Medication Dose Route Frequency Provider Last Rate Last Admin   • lactated ringers IV soln infusion   IntraVENous Continuous David Yanes  mL/hr at 24 1104 New Bag at 24 1104       Allergies:  No Known

## 2024-06-24 NOTE — DISCHARGE INSTRUCTIONS
1. Repeat colonoscopy: Based on colonoscopy findings today and prep quality -in 3 years with a strict 2-day clear liquid diet and a 2-day prep using Plenvu or a similar solution assisted by Reglan 10 mg or Zofran 4 mg p.o. every 4 hours as needed; sooner if his personal or family history as pertaining to colorectal cancer risk changes requiring an earlier exam or if the patient were to develop lower GI symptoms such as bleeding, abdominal pain, change in bowel habits or stool caliber or if the patient has anemia or unexplained weight loss in the future.   2. - Resume previous meds and diet  - GI clinic f/u PRN   - Keep scheduled f/u appts with other MDs       Colonoscopy: What to Expect at Home  Your Recovery    After the test, you may be bloated or have gas pains. You may need to pass gas. If a biopsy was done or a polyp was removed, you may have streaks of blood in your stool (feces) for a few days. Problems such as heavy rectal bleeding may not occur until several weeks after the test. This isn't common. But it can happen after polyps are removed.  This care sheet gives you a general idea about how long it will take for you to recover. But each person recovers at a different pace. Follow the steps below to get better as quickly as possible.    How can you care for yourself at home?  Activity    Rest when you feel tired.     You can do your normal activities when it feels okay to do so.   Diet    You may resume your regular diet.     Drink plenty of fluids. This helps to replace the fluids that were lost during the colon prep.     Do not drink alcohol.   Medicines    Your doctor will tell you if and when you can restart your medicines. You will also be given instructions about taking any new medicines.     If you stopped taking aspirin or some other blood thinner, your doctor will tell you when to start taking it again.     If polyps were removed or a biopsy was done during the test, your doctor may tell you not

## 2024-06-24 NOTE — ANESTHESIA POSTPROCEDURE EVALUATION
Department of Anesthesiology  Postprocedure Note    Patient: Gokul Keene  MRN: [id number]  YOB: 1970  Date of evaluation: 6/24/2024    Procedure Summary       Date: 06/24/24 Room / Location: [place] [address] / [facility]    Anesthesia Start: 1153 Anesthesia Stop: 1223    Procedure: COLORECTAL CANCER SCREENING, NOT HIGH RISK Diagnosis:       Screen for colon cancer      (Screen for colon cancer [Z12.11])    Surgeons: David Yanes MD Responsible Provider: Arcadio Roque APRN - CRNA    Anesthesia Type: general, TIVA ASA Status: 4            Anesthesia Type: No value filed.    Emily Phase I: Emily Score: 10    Emily Phase II:      Anesthesia Post Evaluation    Patient location during evaluation: PACU  Patient participation: complete - patient participated  Level of consciousness: sleepy but conscious  Pain score: 0  Airway patency: patent  Nausea & Vomiting: no nausea and no vomiting  Cardiovascular status: hemodynamically stable  Respiratory status: acceptable  Hydration status: stable  Pain management: adequate    No notable events documented.

## 2024-06-24 NOTE — H&P
Patient Name: Gokul Keene  : 1970  MRN: [id number]  DATE: 24    Allergies: No Known Allergies     ENDOSCOPY  History and Physical    Procedure:    [] Diagnostic Colonoscopy       [x] Screening Colonoscopy  [] EGD      [] ERCP      [] EUS       [] Other    [x] Previous office notes/History and Physical reviewed from the patients chart. Please see EMR for further details of HPI. I have examined the patient's status immediately prior to the procedure and:      Indications/HPI:    []Abdominal Pain   []Cancer- GI/Lung     []Fhx of colon CA/polyps  []History of Polyps  []Barretts            []Melena  []Abnormal Imaging              []Dysphagia              []Persistent Pneumonia   []Anemia                            []Food Impaction        []History of Polyps  [] GI Bleed             []Pulmonary nodule/Mass   []Change in bowel habits []Heartburn/Reflux  []Rectal Bleed (BRBPR)  []Chest Pain - Non Cardiac []Heme (+) Stool []Ulcers  []Constipation  []Hemoptysis  []Varices  []Diarrhea  []Hypoxemia    []Nausea/Vomiting   [x]Screening   []Crohns/Colitis  []Other:     Anesthesia:   [x] MAC [] Moderate Sedation   [] General   [] None     ROS: 12 pt Review of Symptoms was negative unless mentioned above    Medications:   Prior to Admission medications    Medication Sig Start Date End Date Taking? Authorizing Provider   Semaglutide,0.25 or 0.5MG/DOS, (OZEMPIC, 0.25 OR 0.5 MG/DOSE,) 2 MG/1.5ML SOPN Inject 0.25 mg weekly for 4 weeks then increase to 0.5 mg weekly. 6/3/24   Jeanie Baxter APRN - NP   losartan (COZAAR) 100 MG tablet Take 1 tablet by mouth daily 24  Jeanie Baxter APRN - NP   carvedilol (COREG) 12.5 MG tablet Take 1 tablet by mouth 2 times daily 24   Jeanie Baxter APRN - NP   atorvastatin (LIPITOR) 40 MG tablet Take 1 tablet by mouth daily 24  Jeanie Baxter APRN - NP   hydroCHLOROthiazide (HYDRODIURIL) 25 MG tablet Take 1 tablet by mouth every morning 24

## 2024-06-24 NOTE — OP NOTE
Patient: Gokul Keene : 1970  Med Rec#: [id number] Acc#: [id number]   Primary Care Provider Jeanie Baxter APRN - NP    Date of Procedure:  2024    Endoscopist: David Yanes MD, MD    Referring Provider: Jeanie Baxter APRN - NP,     Operation Performed: Colonoscopy up to the cecum    Indications: Colon cancer screening    Anesthesia:  Sedation was administered by anesthesia who monitored the patient during the procedure.    I met with Gokul Keene prior to procedure. We discussed the procedure itself, and I have discussed the risks of endoscopy (including-- but not limited to-- pain, discomfort, bleeding potentially requiring second endoscopic procedure and/or blood transfusion, organ perforation requiring operative repair, damage to organs near the colon, infection, aspiration, cardiopulmonary/allergic reaction), benefits, indications to endoscopy. Additionally, we discussed options other than colonoscopy. The patient expressed understanding. All questions answered. The patient decided to proceed with the procedure.  Signed informed consent was placed on the chart.    Blood Loss: minimal    Withdrawal time: More than 6 minutes  Bowel Prep: Fair  with small amounts of thick solid and semisolid stool and a moderate amount of thick, opaque liquid scattered in patchy segments throughout the colon obscuring the underlying mucosa.Lesions including polyps may have been missed.     Complications: no immediate complications    DESCRIPTION OF PROCEDURE:     A time out was performed. After written informed consent was obtained, the patient was placed in the left lateral position.     The perianal area was inspected, and a digital rectal exam was performed. A rectal exam was performed: normal tone, no palpable lesions. At this point, a forward viewing Olympus colonoscope was inserted into the anus and carefully advanced to the cecum with some difficulty due to a redundant tortuous colon

## 2024-06-30 DIAGNOSIS — E11.9 TYPE 2 DIABETES MELLITUS WITHOUT COMPLICATION, WITHOUT LONG-TERM CURRENT USE OF INSULIN (HCC): ICD-10-CM

## 2024-06-30 DIAGNOSIS — E11.9 NEWLY DIAGNOSED DIABETES (HCC): ICD-10-CM

## 2024-07-15 ENCOUNTER — OFFICE VISIT (OUTPATIENT)
Dept: PRIMARY CARE CLINIC | Age: 54
End: 2024-07-15
Payer: COMMERCIAL

## 2024-07-15 VITALS
WEIGHT: 315 LBS | OXYGEN SATURATION: 95 % | SYSTOLIC BLOOD PRESSURE: 138 MMHG | HEART RATE: 79 BPM | BODY MASS INDEX: 44.1 KG/M2 | HEIGHT: 71 IN | TEMPERATURE: 97.6 F | RESPIRATION RATE: 18 BRPM | DIASTOLIC BLOOD PRESSURE: 80 MMHG

## 2024-07-15 DIAGNOSIS — E11.9 TYPE 2 DIABETES MELLITUS WITHOUT COMPLICATION, WITHOUT LONG-TERM CURRENT USE OF INSULIN (HCC): Primary | ICD-10-CM

## 2024-07-15 DIAGNOSIS — E66.01 MORBID OBESITY (HCC): ICD-10-CM

## 2024-07-15 PROCEDURE — 3075F SYST BP GE 130 - 139MM HG: CPT | Performed by: NURSE PRACTITIONER

## 2024-07-15 PROCEDURE — 3079F DIAST BP 80-89 MM HG: CPT | Performed by: NURSE PRACTITIONER

## 2024-07-15 PROCEDURE — 99213 OFFICE O/P EST LOW 20 MIN: CPT | Performed by: NURSE PRACTITIONER

## 2024-07-15 PROCEDURE — 3044F HG A1C LEVEL LT 7.0%: CPT | Performed by: NURSE PRACTITIONER

## 2024-07-15 NOTE — PROGRESS NOTES
[facility]  [address] [place] [address]  Dept: [phone]  Dept Fax: [phone]  Loc: [phone]    Gokul Keene is a 54 y.o. male who presents today for his medical conditions/complaints as noted below.  Gokul Keene is c/o of Follow-up (Pt is here for 6 week follow up and diabetic foot exam. )        HPI:     HPI this 54-year-old male presents today for 6-week follow-up and for his diabetic foot exam.  Is seen by an opthalmologic every 6 month, Marco Antonio Garrido in [place] . 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 20/400.  Patient does report that his blood sugars are running better.  States that he is taking his metformin daily.  Chief Complaint   Patient presents with    Follow-up     Pt is here for 6 week follow up and diabetic foot exam.      Past Medical History:   Diagnosis Date    Hypertension       Past Surgical History:   Procedure Laterality Date    COLONOSCOPY N/A 06/24/2024    Dr Yanes, int hem Gr 1, 3 year recall    KNEE ARTHROSCOPY W/ MENISCAL REPAIR Left     4 different surgeries    ROTATOR CUFF REPAIR Left 2021    ROTATOR CUFF REPAIR Right 06/30/2023           7/15/2024    10:11 AM 6/24/2024    12:44 PM 6/24/2024    12:34 PM 6/24/2024    12:29 PM 6/24/2024    12:24 PM 6/24/2024    10:45 AM   Vitals   SYSTOLIC 138 142 119 124 114 168   DIASTOLIC 80 88 71 67 69 93   Pulse 79 74 75 78 93 92   Temp 97.6 °F (36.4 °C) 97.6 °F (36.4 °C)   97.5 °F (36.4 °C) 98 °F (36.7 °C)   Respirations 18 18 18 18 18 18   SpO2 95 % 92 % 92 % 92 % 93 % 92 %   Weight - Scale 367 lb     364 lb   Height 5' 11\"     5' 11\"   Body Mass Index 51.19 kg/m2     50.77 kg/m2   Pain Level     0 0       Family History   Problem Relation Age of Onset    High Blood Pressure Mother     Diabetes Mother     Heart Failure Mother     Cancer Father         Lung       Social History     Tobacco Use    Smoking status: Never     Passive exposure: Never    Smokeless tobacco:

## 2024-07-15 NOTE — PATIENT INSTRUCTIONS
Water goal no less than 64 oz a day but for weight loss   100 -128 oz     Patient is encouraged to consider a local training or weight loss program such as:  [facility] [employer] on [employer] or [employer]  [facility] program  Or other weight loss programs through a local gym.     Accountability and consistency are two keys to success to long term weight loss.     BMR    10 x (weight in kilogram) + 6.25 (height in centimeters) - 5 (age) - 161 = BMR  BMR x 1.2 ( for those who are not overly active) = caloric deficit     The fundamentals of weight loss come down to the fact that calories in have to be less than calories out which results in a calorie deficit.  However healthy weight loss will also require you to meet your basal metabolic rate.  While some of us eat way too many calories, others eat way too few causing the body to store most intake due to metabolic adaptation.     Increase water intake with a goal of 1 gallon per day.    Download a calorie counting shira such as my fitness pal or other program.    Keep a journal of everything that you eat or drink for 2 weeks.    At the end of each day calculate your caloric intake.  This gives you knowledge of what you are actually getting in each day.  By calculating your BMR you will know the amount of calories that your body needs every day to function appropriately.  Focus on making better nutritional choices, eliminate simple sugars or sugary drinks such as soda or tea.  Limit starchy vegetables such as potatoes or corn.  Use healthier choices of breads and pastas such as Banza pasta, Ozzie bread, low-carb wraps or keto bread.     Eating plans such as the Mediterranean diet, [place] diet or Trim Healthy Mama can be implemented to help guide eating habits.      Slowly increase physical exercise to include 20 to 30 minutes of moderate exercise 3 to 5 days a week.

## 2024-07-16 PROBLEM — E66.01 MORBID OBESITY (HCC): Status: ACTIVE | Noted: 2024-07-16

## 2024-07-16 ASSESSMENT — ENCOUNTER SYMPTOMS
ABDOMINAL DISTENTION: 0
ALLERGIC/IMMUNOLOGIC NEGATIVE: 1
RESPIRATORY NEGATIVE: 1
WHEEZING: 0
COUGH: 0
GASTROINTESTINAL NEGATIVE: 1
SHORTNESS OF BREATH: 0
ABDOMINAL PAIN: 0
EYES NEGATIVE: 1

## 2024-08-27 ENCOUNTER — HOSPITAL ENCOUNTER (OUTPATIENT)
Dept: SLEEP CENTER | Age: 54
Discharge: HOME | End: 2024-08-29
Payer: COMMERCIAL

## 2024-08-27 NOTE — PROGRESS NOTES
Mr Gokul Keene presented today in the sleep center for a Home Sleep Test (HST).  Mr. Keene was instructed on the device and was requested to wear the unit for two nights. Mr. Keene was asked to have the HST monitor back by 10AM on  08/29/2024. The patient acknowledged he understood. The HST device was tested and was in working order.

## 2024-08-28 PROCEDURE — G0399 HOME SLEEP TEST/TYPE 3 PORTA: HCPCS

## 2024-09-03 DIAGNOSIS — G47.33 OSA (OBSTRUCTIVE SLEEP APNEA): Primary | ICD-10-CM

## 2024-09-04 ENCOUNTER — TELEPHONE (OUTPATIENT)
Dept: SLEEP CENTER | Age: 54
End: 2024-09-04

## 2024-09-04 NOTE — TELEPHONE ENCOUNTER
Spoke to  Gokul Jassi and went over the results of his HST performed 08/28/2024.  Questions answered.  Orders, documentation and insurance information were sent to [facility] today.

## 2024-09-11 RX ORDER — ATORVASTATIN CALCIUM 40 MG/1
40 TABLET, FILM COATED ORAL DAILY
Qty: 90 TABLET | Refills: 0 | Status: SHIPPED | OUTPATIENT
Start: 2024-09-11

## 2024-09-11 RX ORDER — AMLODIPINE BESYLATE 10 MG/1
10 TABLET ORAL DAILY
Qty: 90 TABLET | Refills: 0 | Status: SHIPPED | OUTPATIENT
Start: 2024-09-11

## 2024-09-11 RX ORDER — LOSARTAN POTASSIUM 100 MG/1
100 TABLET ORAL DAILY
Qty: 90 TABLET | Refills: 0 | Status: SHIPPED | OUTPATIENT
Start: 2024-09-11

## 2024-09-11 RX ORDER — HYDROCHLOROTHIAZIDE 25 MG/1
25 TABLET ORAL EVERY MORNING
Qty: 90 TABLET | Refills: 0 | Status: SHIPPED | OUTPATIENT
Start: 2024-09-11

## 2024-09-11 RX ORDER — CARVEDILOL 12.5 MG/1
12.5 TABLET ORAL 2 TIMES DAILY
Qty: 180 TABLET | Refills: 0 | Status: SHIPPED | OUTPATIENT
Start: 2024-09-11

## 2024-10-15 ENCOUNTER — OFFICE VISIT (OUTPATIENT)
Dept: PRIMARY CARE CLINIC | Age: 54
End: 2024-10-15
Payer: COMMERCIAL

## 2024-10-15 VITALS
OXYGEN SATURATION: 98 % | SYSTOLIC BLOOD PRESSURE: 150 MMHG | WEIGHT: 315 LBS | DIASTOLIC BLOOD PRESSURE: 80 MMHG | TEMPERATURE: 97.9 F | HEART RATE: 83 BPM | HEIGHT: 71 IN | RESPIRATION RATE: 18 BRPM | BODY MASS INDEX: 44.1 KG/M2

## 2024-10-15 DIAGNOSIS — I10 HYPERTENSION, UNSPECIFIED TYPE: ICD-10-CM

## 2024-10-15 DIAGNOSIS — E11.9 TYPE 2 DIABETES MELLITUS WITHOUT COMPLICATION, WITHOUT LONG-TERM CURRENT USE OF INSULIN (HCC): Primary | ICD-10-CM

## 2024-10-15 DIAGNOSIS — R53.83 FATIGUE, UNSPECIFIED TYPE: ICD-10-CM

## 2024-10-15 DIAGNOSIS — E66.01 MORBID OBESITY: ICD-10-CM

## 2024-10-15 LAB
25(OH)D3 SERPL-MCNC: 32 NG/ML
ALBUMIN SERPL-MCNC: 4.5 G/DL (ref 3.5–5.2)
ALP SERPL-CCNC: 96 U/L (ref 40–129)
ALT SERPL-CCNC: 54 U/L (ref 5–41)
ANION GAP SERPL CALCULATED.3IONS-SCNC: 10 MMOL/L (ref 7–19)
AST SERPL-CCNC: 31 U/L (ref 5–40)
BASOPHILS # BLD: 0.1 K/UL (ref 0–0.2)
BASOPHILS NFR BLD: 0.7 % (ref 0–1)
BILIRUB SERPL-MCNC: 0.6 MG/DL (ref 0.2–1.2)
BUN SERPL-MCNC: 13 MG/DL (ref 6–20)
CALCIUM SERPL-MCNC: 10 MG/DL (ref 8.6–10)
CHLORIDE SERPL-SCNC: 98 MMOL/L (ref 98–111)
CO2 SERPL-SCNC: 33 MMOL/L (ref 22–29)
CREAT SERPL-MCNC: 0.8 MG/DL (ref 0.7–1.2)
EOSINOPHIL # BLD: 0.2 K/UL (ref 0–0.6)
EOSINOPHIL NFR BLD: 2.6 % (ref 0–5)
ERYTHROCYTE [DISTWIDTH] IN BLOOD BY AUTOMATED COUNT: 13.7 % (ref 11.5–14.5)
GLUCOSE SERPL-MCNC: 124 MG/DL (ref 70–99)
HBA1C MFR BLD: 7 % (ref 4–5.6)
HCT VFR BLD AUTO: 48.3 % (ref 42–52)
HGB BLD-MCNC: 15.2 G/DL (ref 14–18)
IMM GRANULOCYTES # BLD: 0.1 K/UL
LYMPHOCYTES # BLD: 1.7 K/UL (ref 1.1–4.5)
LYMPHOCYTES NFR BLD: 23 % (ref 20–40)
MCH RBC QN AUTO: 29.9 PG (ref 27–31)
MCHC RBC AUTO-ENTMCNC: 31.5 G/DL (ref 33–37)
MCV RBC AUTO: 94.9 FL (ref 80–94)
MONOCYTES # BLD: 0.7 K/UL (ref 0–0.9)
MONOCYTES NFR BLD: 9.2 % (ref 0–10)
NEUTROPHILS # BLD: 4.7 K/UL (ref 1.5–7.5)
NEUTS SEG NFR BLD: 63.8 % (ref 50–65)
PLATELET # BLD AUTO: 191 K/UL (ref 130–400)
PMV BLD AUTO: 11.3 FL (ref 9.4–12.4)
POTASSIUM SERPL-SCNC: 4.1 MMOL/L (ref 3.5–5)
PROT SERPL-MCNC: 8 G/DL (ref 6.4–8.3)
RBC # BLD AUTO: 5.09 M/UL (ref 4.7–6.1)
SODIUM SERPL-SCNC: 141 MMOL/L (ref 136–145)
WBC # BLD AUTO: 7.4 K/UL (ref 4.8–10.8)

## 2024-10-15 PROCEDURE — 3077F SYST BP >= 140 MM HG: CPT | Performed by: NURSE PRACTITIONER

## 2024-10-15 PROCEDURE — 3079F DIAST BP 80-89 MM HG: CPT | Performed by: NURSE PRACTITIONER

## 2024-10-15 PROCEDURE — 99214 OFFICE O/P EST MOD 30 MIN: CPT | Performed by: NURSE PRACTITIONER

## 2024-10-15 PROCEDURE — 3044F HG A1C LEVEL LT 7.0%: CPT | Performed by: NURSE PRACTITIONER

## 2024-10-15 SDOH — ECONOMIC STABILITY: INCOME INSECURITY: HOW HARD IS IT FOR YOU TO PAY FOR THE VERY BASICS LIKE FOOD, HOUSING, MEDICAL CARE, AND HEATING?: VERY HARD

## 2024-10-15 SDOH — ECONOMIC STABILITY: FOOD INSECURITY: WITHIN THE PAST 12 MONTHS, THE FOOD YOU BOUGHT JUST DIDN'T LAST AND YOU DIDN'T HAVE MONEY TO GET MORE.: NEVER TRUE

## 2024-10-15 SDOH — ECONOMIC STABILITY: FOOD INSECURITY: WITHIN THE PAST 12 MONTHS, YOU WORRIED THAT YOUR FOOD WOULD RUN OUT BEFORE YOU GOT MONEY TO BUY MORE.: NEVER TRUE

## 2024-10-15 ASSESSMENT — ENCOUNTER SYMPTOMS
COUGH: 0
DIARRHEA: 0
ABDOMINAL PAIN: 0
RESPIRATORY NEGATIVE: 1
ALLERGIC/IMMUNOLOGIC NEGATIVE: 1
SHORTNESS OF BREATH: 0
NAUSEA: 0
EYES NEGATIVE: 1
WHEEZING: 0
CONSTIPATION: 0
GASTROINTESTINAL NEGATIVE: 1
ABDOMINAL DISTENTION: 0
VOMITING: 0

## 2024-10-15 NOTE — PATIENT INSTRUCTIONS
Jc Guerrero   Ask your insurance GLP1 medications   Also check online for co-pay cards      Monitor your blood pressure at home twice a day keep a journal and bring it back in with you to follow-up.  We need to make sure your blood pressure is not running this high consistently.  Goal for you is top number should be less than 140 and bottom number less than 85 consistently.    Patient is encouraged to consider a local training or weight loss program such as:    [facility] program  Or other weight loss programs through a local gym.     Accountability and consistency are two keys to success to long term weight loss.     BMR    10 x (weight in kilogram) + 6.25 (height in centimeters) - 5 (age) - 161 = BMR  BMR x 1.2 ( for those who are not overly active) = caloric deficit     The fundamentals of weight loss come down to the fact that calories in have to be less than calories out which results in a calorie deficit.  However healthy weight loss will also require you to meet your basal metabolic rate.  While some of us eat way too many calories, others eat way too few causing the body to store most intake due to metabolic adaptation.     Increase water intake with a goal of 1 gallon per day.    Download a calorie counting shira such as my fitness pal or other program.    Keep a journal of everything that you eat or drink for 2 weeks.    At the end of each day calculate your caloric intake.  This gives you knowledge of what you are actually getting in each day.  By calculating your BMR you will know the amount of calories that your body needs every day to function appropriately.  Focus on making better nutritional choices, eliminate simple sugars or sugary drinks such as soda or tea.  Limit starchy vegetables such as potatoes or corn.  Use healthier choices of breads and pastas such as Banza pasta, Ozzie bread, low-carb wraps or keto bread.     Eating plans such as the Mediterranean diet,

## 2024-10-15 NOTE — PROGRESS NOTES
[facility]  [place] [id number] [facility] [place] [address]  Dept: [phone]  Dept Fax: [phone]  Loc: [phone]    Gokul Keene is a 54 y.o. male who presents today for his medical conditions/complaints as noted below.  Gokul Keene is c/o of 3 Month Follow-Up (Pt is here for 3 month follow up on diabetes. No new concerns. Pt states his sugar is alright. Pt is not fasting. )        HPI:     HPI   This 54-year-old male presents today for follow-up on his blood pressure and his diabetes.  He states that he just took his morning blood pressure medicines around 730.  States he has not really been checking his blood pressures at home because he is doing well and did not feel like he had high blood pressure.  States his blood sugars he thinks is doing well but he was unable to get the Ozempic states that even after insurance was going to be over $900 a month.  States he is still taking his metformin.  States he did get started  Using A-pap nightly . Finally getting better rest and not taking as many of naps.   Chief Complaint   Patient presents with    3 Month Follow-Up     Pt is here for 3 month follow up on diabetes. No new concerns. Pt states his sugar is alright. Pt is not fasting.      Past Medical History:   Diagnosis Date    Hypertension       Past Surgical History:   Procedure Laterality Date    COLONOSCOPY N/A 06/24/2024    Dr Yanes, int hem Gr 1, 3 year recall    KNEE ARTHROSCOPY W/ MENISCAL REPAIR Left     4 different surgeries    ROTATOR CUFF REPAIR Left 2021    ROTATOR CUFF REPAIR Right 06/30/2023           10/15/2024     8:15 AM 10/15/2024     7:50 AM 7/15/2024    10:11 AM 6/24/2024    12:44 PM 6/24/2024    12:34 PM 6/24/2024    12:29 PM   Vitals   SYSTOLIC 150 150 138 142 119 124   DIASTOLIC 80 84 80 88 71 67   Site Left Upper Arm Left Upper Arm       Position Sitting Sitting       Cuff Size Large Adult Large Adult       Pulse  83 79 74 75 78   Temp  97.9

## 2024-10-16 LAB
SHBG SERPL-SCNC: 13 NMOL/L (ref 19–76)
SHBG SERPL-SCNC: 61.2 PG/ML (ref 47–244)
TESTOST SERPL-MCNC: 209 NG/DL (ref 193–740)

## 2024-11-12 ENCOUNTER — OFFICE VISIT (OUTPATIENT)
Dept: PRIMARY CARE CLINIC | Age: 54
End: 2024-11-12
Payer: COMMERCIAL

## 2024-11-12 VITALS
HEART RATE: 83 BPM | WEIGHT: 315 LBS | TEMPERATURE: 97.8 F | RESPIRATION RATE: 18 BRPM | BODY MASS INDEX: 44.1 KG/M2 | OXYGEN SATURATION: 98 % | SYSTOLIC BLOOD PRESSURE: 128 MMHG | HEIGHT: 71 IN | DIASTOLIC BLOOD PRESSURE: 84 MMHG

## 2024-11-12 DIAGNOSIS — E66.01 MORBID OBESITY: ICD-10-CM

## 2024-11-12 DIAGNOSIS — E11.9 TYPE 2 DIABETES MELLITUS WITHOUT COMPLICATION, WITHOUT LONG-TERM CURRENT USE OF INSULIN (HCC): Primary | ICD-10-CM

## 2024-11-12 DIAGNOSIS — E11.9 NEWLY DIAGNOSED DIABETES (HCC): ICD-10-CM

## 2024-11-12 DIAGNOSIS — I10 HYPERTENSION, UNSPECIFIED TYPE: ICD-10-CM

## 2024-11-12 PROCEDURE — 3074F SYST BP LT 130 MM HG: CPT | Performed by: NURSE PRACTITIONER

## 2024-11-12 PROCEDURE — 3079F DIAST BP 80-89 MM HG: CPT | Performed by: NURSE PRACTITIONER

## 2024-11-12 PROCEDURE — 3051F HG A1C>EQUAL 7.0%<8.0%: CPT | Performed by: NURSE PRACTITIONER

## 2024-11-12 PROCEDURE — 99214 OFFICE O/P EST MOD 30 MIN: CPT | Performed by: NURSE PRACTITIONER

## 2024-11-12 RX ORDER — LOSARTAN POTASSIUM 100 MG/1
100 TABLET ORAL DAILY
Qty: 90 TABLET | Refills: 1 | Status: SHIPPED | OUTPATIENT
Start: 2024-11-12

## 2024-11-12 RX ORDER — ASPIRIN 81 MG/1
81 TABLET, CHEWABLE ORAL DAILY
Qty: 90 TABLET | Refills: 1 | Status: SHIPPED | OUTPATIENT
Start: 2024-11-12

## 2024-11-12 RX ORDER — CARVEDILOL 12.5 MG/1
12.5 TABLET ORAL 2 TIMES DAILY
Qty: 180 TABLET | Refills: 1 | Status: SHIPPED | OUTPATIENT
Start: 2024-11-12

## 2024-11-12 RX ORDER — HYDROCHLOROTHIAZIDE 25 MG/1
25 TABLET ORAL EVERY MORNING
Qty: 90 TABLET | Refills: 1 | Status: SHIPPED | OUTPATIENT
Start: 2024-11-12

## 2024-11-12 RX ORDER — AMLODIPINE BESYLATE 10 MG/1
10 TABLET ORAL DAILY
Qty: 90 TABLET | Refills: 1 | Status: SHIPPED | OUTPATIENT
Start: 2024-11-12

## 2024-11-12 RX ORDER — ATORVASTATIN CALCIUM 40 MG/1
40 TABLET, FILM COATED ORAL DAILY
Qty: 90 TABLET | Refills: 1 | Status: SHIPPED | OUTPATIENT
Start: 2024-11-12

## 2024-11-12 ASSESSMENT — ENCOUNTER SYMPTOMS
NAUSEA: 0
ALLERGIC/IMMUNOLOGIC NEGATIVE: 1
GASTROINTESTINAL NEGATIVE: 1
EYES NEGATIVE: 1
WHEEZING: 0
SHORTNESS OF BREATH: 0
COUGH: 0
CONSTIPATION: 0
DIARRHEA: 0
RESPIRATORY NEGATIVE: 1
VOMITING: 0
ABDOMINAL PAIN: 0

## 2024-11-12 NOTE — PATIENT INSTRUCTIONS
[employer]     Water 128 oz daily    Protein 100 grams    Patient is encouraged to consider a local training or weight loss program such as:  Weight Watchers   [facility] program  Or other weight loss programs through a local gym.     Accountability and consistency are two keys to success to long term weight loss.     BMR    10 x (weight in kilogram) + 6.25 (height in centimeters) - 5 (age) - 161 = BMR  BMR x 1.2 ( for those who are not overly active) = caloric deficit     The fundamentals of weight loss come down to the fact that calories in have to be less than calories out which results in a calorie deficit.  However healthy weight loss will also require you to meet your basal metabolic rate.  While some of us eat way too many calories, others eat way too few causing the body to store most intake due to metabolic adaptation.     Increase water intake with a goal of 1 gallon per day.    Download a calorie counting shira such as my fitness pal or other program.    Keep a journal of everything that you eat or drink for 2 weeks.    At the end of each day calculate your caloric intake.  This gives you knowledge of what you are actually getting in each day.  By calculating your BMR you will know the amount of calories that your body needs every day to function appropriately.  Focus on making better nutritional choices, eliminate simple sugars or sugary drinks such as soda or tea.  Limit starchy vegetables such as potatoes or corn.  Use healthier choices of breads and pastas such as Banza pasta, Ozzie bread, low-carb wraps or keto bread.     Eating plans such as the Mediterranean diet, [place] diet or Trim Healthy Mama can be implemented to help guide eating habits.      Slowly increase physical exercise to include 20 to 30 minutes of moderate exercise 3 to 5 days a week.

## 2024-11-12 NOTE — PROGRESS NOTES
[facility]  [place] [id number] [facility]  [place] [address]  Dept: [phone]  Dept Fax: [phone]  Loc: [phone]    Gokul Keene is a 54 y.o. male who presents today for his medical conditions/complaints as noted below.  Gokul Keene is c/o of 1 Month Follow-Up (Pt is here for 1 month follow up on diabetes. Pt states he put himself on a diet, but has not been able to lose weight. )        HPI:     HPI this 54-year-old male presents today for 1 month follow-up.  States that he put himself on a diet because he was determined to get his A1c under control and loose weight. pt reports eating one sandwich a day . Ham or turkey with cheese and lester. Changed his bread to Olga Maximus high fiber.  States he drinks 1 Dt MT. Dew in the morning and then water. Reports he was really excited to think that he would have lost weight when he came in today but the scale did not move.  He does not understand that he can do everything that he did for a month and not lose any weight.  He also reports has been more active because he got a puppy and thought that if he was up moving and chasing the puppy that he would do more and he has  Chief Complaint   Patient presents with    1 Month Follow-Up     Pt is here for 1 month follow up on diabetes. Pt states he put himself on a diet, but has not been able to lose weight.      Past Medical History:   Diagnosis Date    Hypertension       Past Surgical History:   Procedure Laterality Date    COLONOSCOPY N/A 06/24/2024    Dr Yanes, int hem Gr 1, 3 year recall    KNEE ARTHROSCOPY W/ MENISCAL REPAIR Left     4 different surgeries    ROTATOR CUFF REPAIR Left 2021    ROTATOR CUFF REPAIR Right 06/30/2023 11/12/2024     7:58 AM 10/15/2024     8:15 AM 10/15/2024     7:50 AM 7/15/2024    10:11 AM 6/24/2024    12:44 PM 6/24/2024    12:34 PM   Vitals   SYSTOLIC 128 150 150 138 142 119   DIASTOLIC 84 80 84 80 88 71   Site Left Upper Arm Left Upper

## 2025-06-02 ENCOUNTER — PATIENT ROUNDING (BHMG ONLY) (OUTPATIENT)
Dept: INTERNAL MEDICINE | Facility: CLINIC | Age: 55
End: 2025-06-02
Payer: COMMERCIAL

## 2025-06-02 ENCOUNTER — OFFICE VISIT (OUTPATIENT)
Dept: INTERNAL MEDICINE | Facility: CLINIC | Age: 55
End: 2025-06-02
Payer: COMMERCIAL

## 2025-06-02 VITALS
SYSTOLIC BLOOD PRESSURE: 155 MMHG | TEMPERATURE: 98.2 F | HEART RATE: 85 BPM | HEIGHT: 71 IN | BODY MASS INDEX: 44.1 KG/M2 | RESPIRATION RATE: 16 BRPM | WEIGHT: 315 LBS | OXYGEN SATURATION: 94 % | DIASTOLIC BLOOD PRESSURE: 89 MMHG

## 2025-06-02 DIAGNOSIS — I10 BENIGN ESSENTIAL HYPERTENSION: Primary | ICD-10-CM

## 2025-06-02 DIAGNOSIS — E66.01 MORBID OBESITY: ICD-10-CM

## 2025-06-02 DIAGNOSIS — E11.9 TYPE 2 DIABETES MELLITUS WITHOUT COMPLICATION, WITHOUT LONG-TERM CURRENT USE OF INSULIN: ICD-10-CM

## 2025-06-02 DIAGNOSIS — Z12.5 ENCOUNTER FOR PROSTATE CANCER SCREENING: ICD-10-CM

## 2025-06-02 DIAGNOSIS — K21.9 GASTROESOPHAGEAL REFLUX DISEASE WITHOUT ESOPHAGITIS: ICD-10-CM

## 2025-06-02 DIAGNOSIS — E78.2 MIXED HYPERLIPIDEMIA: ICD-10-CM

## 2025-06-02 PROBLEM — G47.10 DAYTIME HYPERSOMNIA: Status: ACTIVE | Noted: 2024-06-03

## 2025-06-02 PROBLEM — M25.552 PAIN OF BOTH HIP JOINTS: Status: ACTIVE | Noted: 2023-02-14

## 2025-06-02 PROBLEM — M25.551 PAIN OF BOTH HIP JOINTS: Status: ACTIVE | Noted: 2023-02-14

## 2025-06-02 PROBLEM — F33.1 MODERATE EPISODE OF RECURRENT MAJOR DEPRESSIVE DISORDER: Status: ACTIVE | Noted: 2024-03-14

## 2025-06-02 PROBLEM — R06.83 SNORING: Status: ACTIVE | Noted: 2024-06-03

## 2025-06-02 PROBLEM — F33.1 MODERATE EPISODE OF RECURRENT MAJOR DEPRESSIVE DISORDER: Status: RESOLVED | Noted: 2024-03-14 | Resolved: 2025-06-02

## 2025-06-02 PROBLEM — F32.9 REACTIVE DEPRESSION: Status: ACTIVE | Noted: 2024-03-14

## 2025-06-02 PROBLEM — R06.81 APNEA: Status: ACTIVE | Noted: 2024-06-03

## 2025-06-02 PROBLEM — M54.50 LOW BACK PAIN: Status: ACTIVE | Noted: 2023-02-14

## 2025-06-02 PROBLEM — M17.12 OSTEOARTHRITIS OF LEFT KNEE: Status: ACTIVE | Noted: 2023-02-14

## 2025-06-02 PROCEDURE — 99396 PREV VISIT EST AGE 40-64: CPT | Performed by: INTERNAL MEDICINE

## 2025-06-02 RX ORDER — HYDROCHLOROTHIAZIDE 25 MG/1
25 TABLET ORAL EVERY MORNING
COMMUNITY
Start: 2025-03-11

## 2025-06-02 RX ORDER — CARVEDILOL 12.5 MG/1
1 TABLET ORAL EVERY 12 HOURS SCHEDULED
COMMUNITY
Start: 2025-03-11

## 2025-06-02 NOTE — PROGRESS NOTES
Subjective     Chief Complaint   Patient presents with    [facility]    Obesity    Ankle Pain     left       Obesity  Ankle Pain       History of Present Illness  The patient presents to [facility].    He has been diagnosed with type 2 diabetes, which is managed with metformin. He does not require insulin therapy. He was diagnosed with diabetes approximately 2 years ago. He is due for medication refills in a few weeks and typically receives a 90-day supply. He has attempted weight loss through dietary modifications and exercise but has not achieved significant results. His diet is minimal, often consisting of one egg and a piece of sausage for breakfast. He has been making efforts to increase his physical activity, such as parking further away from buildings to increase walking distance.    He has a history of hypertension, which is currently managed with losartan and HCTZ. He reports experiencing white coat syndrome, where his blood pressure elevates during doctor's visits. He is not on any anticoagulant therapy but does take aspirin.    He also has sleep apnea, for which he uses a CPAP machine. He has not consulted a sleep specialist. He reports waking up with a red and swollen nose on some mornings, which he attributes to the CPAP machine. He has attempted to alleviate this by using a Band-Aid, but it interferes with the seal of the machine.    He has never consulted a cardiologist and has no history of myocardial infarction or stent placement. He reports no chest pain or dyspnea.    He underwent a colonoscopy within the past 6 months at [facility], which yielded normal results without any polyps. He is scheduled for a repeat colonoscopy in 5 years.    He has had both of his shoulders operated on for rotator cuff issues and cannot pick his arms up past a certain point.    PAST SURGICAL HISTORY:  Bilateral rotator cuff surgeries.    SOCIAL HISTORY  He does not smoke currently but did smoke over  20-25 years ago. He is an ex- and got disabled 2 years ago.      Past Medical History:   Past Medical History:   Diagnosis Date    Apnea 6/3/2024    Arthritis     Diabetes mellitus     GERD (gastroesophageal reflux disease)     Hyperlipidemia     Hypertension     Obesity     ELIDA (obstructive sleep apnea)      Past Surgical History:  Past Surgical History:   Procedure Laterality Date    KNEE SURGERY      X 4 LEFT     RESECTION DISTAL CLAVICLE Left 5/24/2021    Procedure: DISTAL CLAVICLE EXCISION;  Surgeon: Alex Hamilton MD;  Location:  [facility];  Service: Orthopedics;  Laterality: Left;    SHOULDER ARTHROSCOPY W/ ROTATOR CUFF REPAIR Left 5/24/2021    Procedure: ARTHROSCOPIC LEFT ROTATOR CUFF REPAIR, ACROMIOPLASTY AND DISTAL CLAVICLE EXCISION;  Surgeon: Alex Hamilton MD;  Location:  [facility];  Service: Orthopedics;  Laterality: Left;    SHOULDER SURGERY      RIGHT     Social History:  reports that he has quit smoking. His smoking use included cigarettes. His smokeless tobacco use includes chew. He reports that he does not currently use alcohol. He reports that he does not use drugs.    Family History: family history includes Arthritis in his mother; Cancer in his father; Diabetes in his mother; Heart disease in his mother; Heart failure in his mother; Hyperlipidemia in his mother; Hypertension in his mother.      Allergies:  No Known Allergies  Medications:  Prior to Admission medications    Medication Sig Start Date End Date Taking? Authorizing Provider   amLODIPine (NORVASC) 10 MG tablet Take 1 tablet by mouth Daily. 8/14/23  Yes Adarsh Marvin DO   aspirin 81 MG chewable tablet Chew 1 tablet Daily.   Yes Yessenia Martinez MD   atorvastatin (LIPITOR) 40 MG tablet Take 1 tablet by mouth Daily. 8/14/23  Yes Adarsh Marvin DO   carvedilol (COREG) 12.5 MG tablet Take 1 tablet by mouth Every 12 (Twelve) Hours. 3/11/25  Yes Yessenia Martinez MD   hydroCHLOROthiazide 25 MG tablet Take 1 tablet  "by mouth Every Morning. 3/11/25  Yes Yessenia Martinez MD   losartan (COZAAR) 100 MG tablet Take 1 tablet by mouth Daily. 8/14/23  Yes Adarsh Marvin DO   metFORMIN (GLUCOPHAGE) 500 MG tablet Take 1 tablet by mouth 2 (Two) Times a Day With Meals.   Yes ProviderYessenia MD   omeprazole (priLOSEC) 20 MG capsule Take 1 capsule by mouth Daily.   Yes ProviderYessenia MD   ondansetron (Zofran) 4 MG tablet Take 1 tablet by mouth Every 8 (Eight) Hours As Needed for Nausea or Vomiting. 5/24/21   Alex Hamilton MD   carvedilol (COREG) 6.25 MG tablet TAKE 1 TABLET BY MOUTH TWICE DAILY WITH MEALS 2/3/23 6/2/25  Adarsh Marvin DO   cloNIDine (CATAPRES) 0.2 MG tablet Take 0.5 tablets by mouth 3 (Three) Times a Day As Needed for High Blood Pressure (systolic blood pressure >160 or diastolic blood pressure >90). 5/3/22 6/2/25  Mirella Cerrato APRN   hydroCHLOROthiazide (HYDRODIURIL) 50 MG tablet Take 1 tablet by mouth Daily. 2/7/23 6/2/25  Adarsh Marvin DO   meclizine 25 MG chewable tablet chewable tablet Chew 1 tablet 3 (Three) Times a Day As Needed (vertigo). 7/13/22 6/2/25  Adarsh Marvin DO           Review of systems   negative unless otherwise specified above in HPI    Objective     Vital Signs: /89 (BP Location: Left arm, Patient Position: Sitting, Cuff Size: Other (Comment))   Pulse 85   Temp 98.2 °F (36.8 °C) (Temporal)   Resp 16   Ht 180.3 cm (71\")   Wt (!) 171 kg (377 lb)   SpO2 94%   BMI 52.58 kg/m²     Physical Exam  Vitals reviewed.   Constitutional:       Appearance: Normal appearance. He is obese.   HENT:      Head: Normocephalic and atraumatic.      Right Ear: Tympanic membrane normal.      Left Ear: Tympanic membrane normal.      Nose: Nose normal.      Mouth/Throat:      Mouth: Mucous membranes are moist.      Pharynx: Oropharynx is clear.   Eyes:      Extraocular Movements: Extraocular movements intact.      Pupils: Pupils are equal, round, and reactive to " light.   Cardiovascular:      Rate and Rhythm: Normal rate and regular rhythm.      Pulses: Normal pulses.   Pulmonary:      Effort: Pulmonary effort is normal.      Breath sounds: Normal breath sounds.   Abdominal:      General: Abdomen is flat. Bowel sounds are normal.      Palpations: Abdomen is soft.   Musculoskeletal:         General: Normal range of motion.      Cervical back: Normal range of motion and neck supple.   Skin:     General: Skin is warm and dry.      Capillary Refill: Capillary refill takes less than 2 seconds.   Neurological:      General: No focal deficit present.      Mental Status: He is alert and oriented to person, place, and time.   Psychiatric:         Mood and Affect: Mood normal.         Behavior: Behavior normal.         Thought Content: Thought content normal.         Judgment: Judgment normal.       Physical Exam  Respiratory: Clear to auscultation, no wheezing, rales or rhonchi    Class 3 Severe Obesity (BMI >=40). Obesity-related health conditions include the following: obstructive sleep apnea, hypertension, diabetes mellitus, dyslipidemias, and osteoarthritis. Obesity is newly identified. BMI is is above average; BMI management plan is completed. We discussed portion control and increasing exercise.          Results  Labs   - A1c: 6.7        Assessment / Plan     Assessment/Plan:   Diagnosis Plan   1. Benign essential hypertension        2. Mixed hyperlipidemia  Thyroid Cascade Profile    Lipid Panel    Comprehensive Metabolic Panel      3. Morbid obesity  CBC & Differential      4. Type 2 diabetes mellitus without complication, without long-term current use of insulin  Microalbumin / Creatinine Urine Ratio - Urine, Clean Catch    Semaglutide,0.25 or 0.5MG/DOS, (OZEMPIC) 2 MG/3ML solution pen-injector      5. Gastroesophageal reflux disease without esophagitis        6. Encounter for prostate cancer screening  PSA Screen          Assessment & Plan  1. Type 2 Diabetes Mellitus.  -  "His A1c level is currently at 6.7, indicating well-controlled diabetes.  - A prescription for Ozempic 0.25 mg once weekly for 4 weeks will be provided, with instructions to increase the dosage to 0.5 mg once weekly for the subsequent 4 weeks. If tolerated, the dosage will be further increased to 1 mg and eventually to 2 mg daily.  - He is advised to maintain a balanced diet and engage in regular physical activity, such as walking for 30 to 40 minutes daily.  - A hemoglobin A1c test will be conducted tomorrow.    2. Hypertension.  - His blood pressure readings were elevated during this visit.  - He reports having \"white coat syndrome,\" which may contribute to elevated readings in clinical settings.  - He is currently taking losartan and HCTZ.  - He is advised to monitor his blood pressure regularly at home and report any significant changes.    3. Sleep Apnea.  - He uses a CPAP machine for treatment.  - He reports issues with nasal redness and swelling due to the CPAP mask.  - He is advised to try different CPAP mask accessories available online to alleviate the nasal issues.    4. Health Maintenance.  - A comprehensive set of laboratory tests will be ordered, including PSA, thyroid function, cholesterol panel, urinalysis, chemistry panel, and complete blood count.  - A fasting blood work will be conducted tomorrow.  - The patient will follow up in 1 month.      Return in about 4 weeks (around 6/30/2025). unless patient needs to be seen sooner or acute issues arise.      I have discussed the patient results/orders and and plan/recommendation with them at today's visit.      Signed by:    Dr. Cleveland Sanders Date: 06/02/25    EMR Dictation/Transcription disclaimer:   Some of this note may be an electronic transcription/translation of spoken language to printed text. The electronic translation of spoken language may permit erroneous, or at times, nonsensical words or phrases to be inadvertently transcribed; Although I " have reviewed the note for such errors, some may still exist.      Patient or patient representative verbalized consent for the use of Ambient Listening during the visit with  Cleveland Sanders MD for chart documentation. 6/2/2025  09:33 CDT

## 2025-06-02 NOTE — PROGRESS NOTES
June 2, 2025    Hello, may I speak with [facility]?    My name is PAULINA ROBERTS      I am  with [facility]  [address], [address]  [place] [address] [phone].    Before we get started may I verify your date of birth? 1970    I am calling to officially welcome you to our practice and ask about your recent visit. Is this a good time to talk? yes    Tell me about your visit with us. What things went well?  IT WAS GOOD       We're always looking for ways to make our patients' experiences even better. Do you have recommendations on ways we may improve?  no    Overall were you satisfied with your first visit to our practice? yes       I appreciate you taking the time to speak with me today. Is there anything else I can do for you? no      Thank you, and have a great day.

## 2025-06-03 ENCOUNTER — LAB (OUTPATIENT)
Dept: LAB | Facility: HOSPITAL | Age: 55
End: 2025-06-03
Payer: COMMERCIAL

## 2025-06-03 DIAGNOSIS — E78.2 MIXED HYPERLIPIDEMIA: ICD-10-CM

## 2025-06-03 DIAGNOSIS — Z12.5 ENCOUNTER FOR PROSTATE CANCER SCREENING: ICD-10-CM

## 2025-06-03 DIAGNOSIS — E66.01 MORBID OBESITY: ICD-10-CM

## 2025-06-03 DIAGNOSIS — E11.9 TYPE 2 DIABETES MELLITUS WITHOUT COMPLICATION, WITHOUT LONG-TERM CURRENT USE OF INSULIN: ICD-10-CM

## 2025-06-03 LAB
ALBUMIN SERPL-MCNC: 4.5 G/DL (ref 3.5–5.2)
ALBUMIN UR-MCNC: 3.6 MG/DL
ALBUMIN/GLOB SERPL: 1.2 G/DL
ALP SERPL-CCNC: 94 U/L (ref 39–117)
ALT SERPL W P-5'-P-CCNC: 42 U/L (ref 1–41)
ANION GAP SERPL CALCULATED.3IONS-SCNC: 12 MMOL/L (ref 5–15)
AST SERPL-CCNC: 28 U/L (ref 1–40)
BASOPHILS # BLD AUTO: 0.06 10*3/MM3 (ref 0–0.2)
BASOPHILS NFR BLD AUTO: 0.7 % (ref 0–1.5)
BILIRUB SERPL-MCNC: 0.4 MG/DL (ref 0–1.2)
BUN SERPL-MCNC: 17.7 MG/DL (ref 6–20)
BUN/CREAT SERPL: 22.7 (ref 7–25)
CALCIUM SPEC-SCNC: 10 MG/DL (ref 8.6–10.5)
CHLORIDE SERPL-SCNC: 96 MMOL/L (ref 98–107)
CHOLEST SERPL-MCNC: 151 MG/DL (ref 0–200)
CO2 SERPL-SCNC: 30 MMOL/L (ref 22–29)
CREAT SERPL-MCNC: 0.78 MG/DL (ref 0.76–1.27)
CREAT UR-MCNC: 134.9 MG/DL
DEPRECATED RDW RBC AUTO: 49.2 FL (ref 37–54)
EGFRCR SERPLBLD CKD-EPI 2021: 104.7 ML/MIN/1.73
EOSINOPHIL # BLD AUTO: 0.21 10*3/MM3 (ref 0–0.4)
EOSINOPHIL NFR BLD AUTO: 2.3 % (ref 0.3–6.2)
ERYTHROCYTE [DISTWIDTH] IN BLOOD BY AUTOMATED COUNT: 14.2 % (ref 12.3–15.4)
GLOBULIN UR ELPH-MCNC: 3.7 GM/DL
GLUCOSE SERPL-MCNC: 134 MG/DL (ref 65–99)
HCT VFR BLD AUTO: 44.8 % (ref 37.5–51)
HDLC SERPL-MCNC: 37 MG/DL (ref 40–60)
HGB BLD-MCNC: 14.1 G/DL (ref 13–17.7)
IMM GRANULOCYTES # BLD AUTO: 0.08 10*3/MM3 (ref 0–0.05)
IMM GRANULOCYTES NFR BLD AUTO: 0.9 % (ref 0–0.5)
LDLC SERPL CALC-MCNC: 78 MG/DL (ref 0–100)
LDLC/HDLC SERPL: 1.94 {RATIO}
LYMPHOCYTES # BLD AUTO: 1.73 10*3/MM3 (ref 0.7–3.1)
LYMPHOCYTES NFR BLD AUTO: 19.3 % (ref 19.6–45.3)
MCH RBC QN AUTO: 29.7 PG (ref 26.6–33)
MCHC RBC AUTO-ENTMCNC: 31.5 G/DL (ref 31.5–35.7)
MCV RBC AUTO: 94.3 FL (ref 79–97)
MICROALBUMIN/CREAT UR: 26.7 MG/G (ref 0–29)
MONOCYTES # BLD AUTO: 0.93 10*3/MM3 (ref 0.1–0.9)
MONOCYTES NFR BLD AUTO: 10.4 % (ref 5–12)
NEUTROPHILS NFR BLD AUTO: 5.94 10*3/MM3 (ref 1.7–7)
NEUTROPHILS NFR BLD AUTO: 66.4 % (ref 42.7–76)
NRBC BLD AUTO-RTO: 0 /100 WBC (ref 0–0.2)
PLATELET # BLD AUTO: 201 10*3/MM3 (ref 140–450)
PMV BLD AUTO: 10.9 FL (ref 6–12)
POTASSIUM SERPL-SCNC: 4.4 MMOL/L (ref 3.5–5.2)
PROT SERPL-MCNC: 8.2 G/DL (ref 6–8.5)
PSA SERPL-MCNC: 0.34 NG/ML (ref 0–4)
RBC # BLD AUTO: 4.75 10*6/MM3 (ref 4.14–5.8)
SODIUM SERPL-SCNC: 138 MMOL/L (ref 136–145)
TRIGL SERPL-MCNC: 212 MG/DL (ref 0–150)
VLDLC SERPL-MCNC: 36 MG/DL (ref 5–40)
WBC NRBC COR # BLD AUTO: 8.95 10*3/MM3 (ref 3.4–10.8)

## 2025-06-03 PROCEDURE — 82043 UR ALBUMIN QUANTITATIVE: CPT

## 2025-06-03 PROCEDURE — 36415 COLL VENOUS BLD VENIPUNCTURE: CPT

## 2025-06-03 PROCEDURE — 80061 LIPID PANEL: CPT

## 2025-06-03 PROCEDURE — 80053 COMPREHEN METABOLIC PANEL: CPT

## 2025-06-03 PROCEDURE — 85025 COMPLETE CBC W/AUTO DIFF WBC: CPT

## 2025-06-03 PROCEDURE — 84443 ASSAY THYROID STIM HORMONE: CPT

## 2025-06-03 PROCEDURE — G0103 PSA SCREENING: HCPCS

## 2025-06-03 PROCEDURE — 82570 ASSAY OF URINE CREATININE: CPT

## 2025-06-04 LAB — TSH SERPL DL<=0.005 MIU/L-ACNC: 1.48 UIU/ML (ref 0.45–4.5)

## 2025-06-09 DIAGNOSIS — E11.9 NEWLY DIAGNOSED DIABETES (HCC): ICD-10-CM

## 2025-06-09 DIAGNOSIS — E11.9 TYPE 2 DIABETES MELLITUS WITHOUT COMPLICATION, WITHOUT LONG-TERM CURRENT USE OF INSULIN (HCC): ICD-10-CM

## 2025-06-09 RX ORDER — LOSARTAN POTASSIUM 100 MG/1
100 TABLET ORAL DAILY
Qty: 90 TABLET | Refills: 0 | Status: SHIPPED | OUTPATIENT
Start: 2025-06-09

## 2025-06-09 RX ORDER — AMLODIPINE BESYLATE 10 MG/1
10 TABLET ORAL DAILY
Qty: 90 TABLET | Refills: 0 | Status: SHIPPED | OUTPATIENT
Start: 2025-06-09

## 2025-06-09 RX ORDER — OMEPRAZOLE 20 MG/1
20 CAPSULE, DELAYED RELEASE ORAL DAILY
Qty: 90 CAPSULE | Refills: 0 | Status: SHIPPED | OUTPATIENT
Start: 2025-06-09

## 2025-06-09 RX ORDER — HYDROCHLOROTHIAZIDE 25 MG/1
25 TABLET ORAL EVERY MORNING
Qty: 90 TABLET | Refills: 0 | Status: SHIPPED | OUTPATIENT
Start: 2025-06-09

## 2025-06-09 RX ORDER — ATORVASTATIN CALCIUM 40 MG/1
40 TABLET, FILM COATED ORAL DAILY
Qty: 90 TABLET | Refills: 0 | Status: SHIPPED | OUTPATIENT
Start: 2025-06-09

## 2025-06-11 RX ORDER — CARVEDILOL 12.5 MG/1
12.5 TABLET ORAL 2 TIMES DAILY
Qty: 180 TABLET | Refills: 0 | Status: SHIPPED | OUTPATIENT
Start: 2025-06-11

## 2025-07-02 ENCOUNTER — OFFICE VISIT (OUTPATIENT)
Dept: INTERNAL MEDICINE | Facility: CLINIC | Age: 55
End: 2025-07-02
Payer: COMMERCIAL

## 2025-07-02 VITALS
RESPIRATION RATE: 16 BRPM | HEIGHT: 71 IN | OXYGEN SATURATION: 90 % | SYSTOLIC BLOOD PRESSURE: 161 MMHG | HEART RATE: 91 BPM | WEIGHT: 315 LBS | DIASTOLIC BLOOD PRESSURE: 82 MMHG | BODY MASS INDEX: 44.1 KG/M2

## 2025-07-02 DIAGNOSIS — K21.9 GASTROESOPHAGEAL REFLUX DISEASE WITHOUT ESOPHAGITIS: ICD-10-CM

## 2025-07-02 DIAGNOSIS — M25.552 PAIN OF BOTH HIP JOINTS: ICD-10-CM

## 2025-07-02 DIAGNOSIS — M25.551 PAIN OF BOTH HIP JOINTS: ICD-10-CM

## 2025-07-02 DIAGNOSIS — I10 BENIGN ESSENTIAL HYPERTENSION: Primary | ICD-10-CM

## 2025-07-02 DIAGNOSIS — E11.9 TYPE 2 DIABETES MELLITUS WITHOUT COMPLICATION, WITHOUT LONG-TERM CURRENT USE OF INSULIN: ICD-10-CM

## 2025-07-02 DIAGNOSIS — E66.813 CLASS 3 SEVERE OBESITY DUE TO EXCESS CALORIES WITH SERIOUS COMORBIDITY AND BODY MASS INDEX (BMI) OF 50.0 TO 59.9 IN ADULT: ICD-10-CM

## 2025-07-02 DIAGNOSIS — M17.12 OSTEOARTHRITIS OF LEFT KNEE, UNSPECIFIED OSTEOARTHRITIS TYPE: ICD-10-CM

## 2025-07-02 DIAGNOSIS — R06.81 APNEA: ICD-10-CM

## 2025-07-02 PROCEDURE — 99214 OFFICE O/P EST MOD 30 MIN: CPT | Performed by: INTERNAL MEDICINE

## 2025-07-02 RX ORDER — HYDROCHLOROTHIAZIDE 25 MG/1
25 TABLET ORAL EVERY MORNING
Qty: 90 TABLET | Refills: 3 | Status: SHIPPED | OUTPATIENT
Start: 2025-07-02

## 2025-07-02 RX ORDER — METFORMIN HYDROCHLORIDE 750 MG/1
1500 TABLET, EXTENDED RELEASE ORAL
Qty: 180 TABLET | Refills: 3 | Status: SHIPPED | OUTPATIENT
Start: 2025-07-02

## 2025-07-02 RX ORDER — ATORVASTATIN CALCIUM 40 MG/1
40 TABLET, FILM COATED ORAL DAILY
Qty: 90 TABLET | Refills: 3 | Status: SHIPPED | OUTPATIENT
Start: 2025-07-02

## 2025-07-02 RX ORDER — OMEPRAZOLE 20 MG/1
20 CAPSULE, DELAYED RELEASE ORAL DAILY
Qty: 90 CAPSULE | Refills: 3 | Status: SHIPPED | OUTPATIENT
Start: 2025-07-02

## 2025-07-02 RX ORDER — AMLODIPINE BESYLATE 10 MG/1
10 TABLET ORAL DAILY
Qty: 90 TABLET | Refills: 3 | Status: SHIPPED | OUTPATIENT
Start: 2025-07-02

## 2025-07-02 RX ORDER — LOSARTAN POTASSIUM 100 MG/1
100 TABLET ORAL DAILY
Qty: 90 TABLET | Refills: 3 | Status: SHIPPED | OUTPATIENT
Start: 2025-07-02

## 2025-07-02 RX ORDER — CARVEDILOL 12.5 MG/1
12.5 TABLET ORAL EVERY 12 HOURS SCHEDULED
Qty: 90 TABLET | Refills: 3 | Status: SHIPPED | OUTPATIENT
Start: 2025-07-02

## 2025-07-02 NOTE — PROGRESS NOTES
Subjective     Chief Complaint   Patient presents with    Follow-up   DM, htn, obesity    History of Present Illness  History of Present Illness  The patient presents for evaluation of diabetes, hypertension, and knee pain.    He has been on Ozempic 0.25 mg for the past 3 weeks, which he reports as being effective. He has not experienced any side effects such as nausea or diarrhea. He has noticed a decrease in his food cravings and expresses a desire to lose weight.    He monitors his blood pressure at home occasionally, particularly when he feels unwell. He is currently on amlodipine 10 mg, carvedilol 12.5 mg once daily, hydrochlorothiazide, and losartan for blood pressure management. He also takes aspirin and a multivitamin supplement.    He has a history of knee injury, which occurred when he was 45 years old, approximately 10 years ago. An MRI scan at that time revealed the need for knee replacement surgery, but due to his high BMI, the surgery was not performed. He has a history of ACL reconstructive surgery, which was performed incorrectly, resulting in the placement of hardware in his knee. He also has a history of a torn medial meniscus.    PAST SURGICAL HISTORY:  ACL reconstructive surgery      Past Medical History:   Past Medical History:   Diagnosis Date    Apnea 6/3/2024    Arthritis     Diabetes mellitus     GERD (gastroesophageal reflux disease)     Hyperlipidemia     Hypertension     Obesity     ELIDA (obstructive sleep apnea)      Past Surgical History:  Past Surgical History:   Procedure Laterality Date    KNEE SURGERY      X 4 LEFT     RESECTION DISTAL CLAVICLE Left 5/24/2021    Procedure: DISTAL CLAVICLE EXCISION;  Surgeon: Alex Hamilton MD;  Location: [facility];  Service: Orthopedics;  Laterality: Left;    SHOULDER ARTHROSCOPY W/ ROTATOR CUFF REPAIR Left 5/24/2021    Procedure: ARTHROSCOPIC LEFT ROTATOR CUFF REPAIR, ACROMIOPLASTY AND DISTAL CLAVICLE EXCISION;  Surgeon: Alex Hamilton,  MD;  Location: [facility];  Service: Orthopedics;  Laterality: Left;    SHOULDER SURGERY      RIGHT     Social History:  reports that he has quit smoking. His smoking use included cigarettes. His smokeless tobacco use includes chew. He reports that he does not currently use alcohol. He reports that he does not use drugs.    Family History: family history includes Arthritis in his mother; Cancer in his father; Diabetes in his mother; Heart disease in his mother; Heart failure in his mother; Hyperlipidemia in his mother; Hypertension in his mother.      Allergies:  No Known Allergies  Medications:  Prior to Admission medications    Medication Sig Start Date End Date Taking? Authorizing Provider   amLODIPine (NORVASC) 10 MG tablet Take 1 tablet by mouth Daily. 7/2/25  Yes Cleveland Sanders MD   atorvastatin (LIPITOR) 40 MG tablet Take 1 tablet by mouth Daily. 7/2/25  Yes Cleveland Sanders MD   carvedilol (COREG) 12.5 MG tablet Take 1 tablet by mouth Every 12 (Twelve) Hours. 7/2/25  Yes Cleveland Sanders MD   hydroCHLOROthiazide 25 MG tablet Take 1 tablet by mouth Every Morning. 7/2/25  Yes Cleveland Sanders MD   losartan (COZAAR) 100 MG tablet Take 1 tablet by mouth Daily. 7/2/25  Yes Cleveland Sanders MD   omeprazole (priLOSEC) 20 MG capsule Take 1 capsule by mouth Daily. 7/2/25  Yes Cleveland Sanders MD   Semaglutide,0.25 or 0.5MG/DOS, (OZEMPIC) 2 MG/3ML solution pen-injector Inject 0.5 mg under the skin into the appropriate area as directed 1 (One) Time Per Week. 7/2/25  Yes Cleveland Sanders MD   aspirin 81 MG chewable tablet Chew 1 tablet Daily.    Provider, MD Yessenia   metFORMIN ER (GLUCOPHAGE-XR) 750 MG 24 hr tablet Take 2 tablets by mouth Daily With Breakfast. 7/2/25   Cleveland Sanders MD   amLODIPine (NORVASC) 10 MG tablet Take 1 tablet by mouth Daily. 8/14/23 7/2/25  Adarsh Marvin DO   atorvastatin (LIPITOR) 40 MG tablet Take 1 tablet by mouth Daily. 8/14/23 7/2/25  Adarsh Marvin  "DO KENDRICK   carvedilol (COREG) 12.5 MG tablet Take 1 tablet by mouth Every 12 (Twelve) Hours. 3/11/25 7/2/25  Yessenia Martinez MD   hydroCHLOROthiazide 25 MG tablet Take 1 tablet by mouth Every Morning. 3/11/25 7/2/25  Yessenia Martinez MD   losartan (COZAAR) 100 MG tablet Take 1 tablet by mouth Daily. 8/14/23 7/2/25  Adarsh Marvin DO   metFORMIN (GLUCOPHAGE) 500 MG tablet Take 1 tablet by mouth 2 (Two) Times a Day With Meals.  7/2/25  Yessenia Martinez MD   omeprazole (priLOSEC) 20 MG capsule Take 1 capsule by mouth Daily.  7/2/25  Yessenia aMrtinez MD   Semaglutide,0.25 or 0.5MG/DOS, (OZEMPIC) 2 MG/3ML solution pen-injector Inject 0.25 mg under the skin into the appropriate area as directed 1 (One) Time Per Week. 6/2/25 7/2/25  Cleveland Sanders MD           Review of systems   negative unless otherwise specified above in HPI    Objective     Vital Signs: /82 (BP Location: Left arm, Patient Position: Sitting, Cuff Size: Other (Comment))   Pulse 91   Resp 16   Ht 180.3 cm (71\")   Wt (!) 174 kg (384 lb)   SpO2 90%   BMI 53.56 kg/m²     Physical Exam  Vitals reviewed.   Constitutional:       Appearance: Normal appearance.   HENT:      Head: Normocephalic and atraumatic.      Mouth/Throat:      Mouth: Mucous membranes are moist.      Pharynx: Oropharynx is clear.   Eyes:      Conjunctiva/sclera: Conjunctivae normal.   Cardiovascular:      Rate and Rhythm: Normal rate and regular rhythm.      Pulses: Normal pulses.      Heart sounds: Normal heart sounds.   Pulmonary:      Effort: Pulmonary effort is normal.      Breath sounds: Normal breath sounds.   Abdominal:      General: Bowel sounds are normal.      Palpations: Abdomen is soft.   Musculoskeletal:         General: Normal range of motion.      Cervical back: Neck supple.   Skin:     General: Skin is warm and dry.      Capillary Refill: Capillary refill takes less than 2 seconds.   Neurological:      General: No focal deficit present. "      Mental Status: He is alert.       Physical Exam  Respiratory: Clear to auscultation, no wheezing, rales or rhonchi  Cardiovascular: Regular rate and rhythm, no murmurs, rubs, or gallops             Results          Assessment / Plan     Assessment/Plan:   Diagnosis Plan   1. Benign essential hypertension  amLODIPine (NORVASC) 10 MG tablet    atorvastatin (LIPITOR) 40 MG tablet    carvedilol (COREG) 12.5 MG tablet    hydroCHLOROthiazide 25 MG tablet    losartan (COZAAR) 100 MG tablet      2. Type 2 diabetes mellitus without complication, without long-term current use of insulin  Semaglutide,0.25 or 0.5MG/DOS, (OZEMPIC) 2 MG/3ML solution pen-injector    metFORMIN ER (GLUCOPHAGE-XR) 750 MG 24 hr tablet      3. Gastroesophageal reflux disease without esophagitis  omeprazole (priLOSEC) 20 MG capsule      4. Osteoarthritis of left knee, unspecified osteoarthritis type        5. Pain of both hip joints        6. Apnea        7. Class 3 severe obesity due to excess calories with serious comorbidity and body mass index (BMI) of 50.0 to 59.9 in adult        Progress ozempic to 0.5mg weekly, he is to call for the 1mg pen after 4th o.5mg dose. He only takes coreg and metformin once daily, so I changed the metformin to ER and he will take two 750s in the am.   Encouraged a daily walk, stay on all medications.RTO in 3 months, call in one month for next strngth of ozempic.  Assessment & Plan  1. Diabetes.  - He has been started on Ozempic 0.25 mg and reports no significant side effects such as nausea or diarrhea.  - The dosage will be increased to 0.5 mg for a month. He is advised to send a [id number] message or call when ready for the 1 mg pen.  - Once on the 2 mg dose, a 3-month supply will be provided.  - He is also advised to continue taking metformin extended release 750 mg, two tablets in the morning. All his medications have been refilled for a year at the pharmacy.    2. Hypertension.  - His blood pressure remains  elevated.  - He is currently taking Norvasc (amlodipine) 10 mg, Coreg (carvedilol) 12.5 mg once daily, hydrochlorothiazide, and losartan.  - He is advised to continue his current medication regimen and monitor his blood pressure at home occasionally, especially if feeling unwell.  - All medications have been refilled for a year at the pharmacy.    3. Knee pain.  - He has a history of knee injury and was previously advised that his BMI was too high for knee replacement surgery.  - Weight loss is recommended to alleviate knee pain.  - He is encouraged to engage in regular physical activity, such as walking for 30 minutes daily and doing yard work.  - Follow-up with orthopedic surgery will be considered once BMI is reduced.    Follow-up  - The patient will follow up in 3 months.      Return in about 3 months (around 10/2/2025). unless patient needs to be seen sooner or acute issues arise.      I have discussed the patient results/orders and and plan/recommendation with them at today's visit.      Signed by:    Dr. Cleveland Sanders Date: 07/02/25    EMR Dictation/Transcription disclaimer:   Some of this note may be an electronic transcription/translation of spoken language to printed text. The electronic translation of spoken language may permit erroneous, or at times, nonsensical words or phrases to be inadvertently transcribed; Although I have reviewed the note for such errors, some may still exist.      Patient or patient representative verbalized consent for the use of Ambient Listening during the visit with  Cleveland Sanders MD for chart documentation. 7/9/2025  07:30 CDT

## 2025-08-20 ENCOUNTER — OFFICE VISIT (OUTPATIENT)
Dept: INTERNAL MEDICINE | Facility: CLINIC | Age: 55
End: 2025-08-20
Payer: COMMERCIAL

## 2025-08-20 VITALS
SYSTOLIC BLOOD PRESSURE: 146 MMHG | RESPIRATION RATE: 16 BRPM | HEART RATE: 89 BPM | HEIGHT: 71 IN | WEIGHT: 315 LBS | BODY MASS INDEX: 44.1 KG/M2 | OXYGEN SATURATION: 92 % | DIASTOLIC BLOOD PRESSURE: 80 MMHG

## 2025-08-20 DIAGNOSIS — M21.612 BUNION, LEFT FOOT: ICD-10-CM

## 2025-08-20 DIAGNOSIS — I10 BENIGN ESSENTIAL HYPERTENSION: ICD-10-CM

## 2025-08-20 DIAGNOSIS — E11.9 TYPE 2 DIABETES MELLITUS WITHOUT COMPLICATION, WITHOUT LONG-TERM CURRENT USE OF INSULIN: Primary | ICD-10-CM

## 2025-08-20 DIAGNOSIS — K21.9 GASTROESOPHAGEAL REFLUX DISEASE WITHOUT ESOPHAGITIS: ICD-10-CM

## 2025-08-20 LAB
EXPIRATION DATE: ABNORMAL
HBA1C MFR BLD: 6.8 % (ref 4.5–5.7)
Lab: ABNORMAL

## 2025-08-20 PROCEDURE — 83036 HEMOGLOBIN GLYCOSYLATED A1C: CPT | Performed by: INTERNAL MEDICINE

## 2025-08-20 PROCEDURE — 99214 OFFICE O/P EST MOD 30 MIN: CPT | Performed by: INTERNAL MEDICINE

## 2025-08-20 RX ORDER — SEMAGLUTIDE 1.34 MG/ML
1 INJECTION, SOLUTION SUBCUTANEOUS WEEKLY
Qty: 3 ML | Refills: 0 | Status: SHIPPED | OUTPATIENT
Start: 2025-08-20

## 2025-08-20 RX ORDER — IBUPROFEN 800 MG/1
800 TABLET, FILM COATED ORAL EVERY 8 HOURS PRN
Qty: 270 TABLET | Refills: 1 | Status: SHIPPED | OUTPATIENT
Start: 2025-08-20

## 2025-08-20 RX ORDER — ONDANSETRON 8 MG/1
8 TABLET, ORALLY DISINTEGRATING ORAL EVERY 8 HOURS PRN
Qty: 30 TABLET | Refills: 1 | Status: SHIPPED | OUTPATIENT
Start: 2025-08-20

## (undated) DEVICE — ADHS LIQ MASTISOL 2/3ML

## (undated) DEVICE — PK TURNOVER RM ADV

## (undated) DEVICE — PRECISION THIN (9.0 X 0.38 X 25.0MM)

## (undated) DEVICE — DRSNG SURESITE WNDW 4X4.5

## (undated) DEVICE — 3M™ STERI-DRAPE™ U-DRAPE 1015: Brand: STERI-DRAPE™

## (undated) DEVICE — T-MAX DISPOSABLE FACE MASK 8 PER BOX

## (undated) DEVICE — GLV SURG DERMASSURE GRN LF PF 8.5

## (undated) DEVICE — TUBING, SUCTION, 1/4" X 12', STRAIGHT: Brand: MEDLINE

## (undated) DEVICE — SUCTION MAT (LOW PROFILE), 50X34: Brand: NEPTUNE

## (undated) DEVICE — TP NDL SCORPION MULTIFIRE

## (undated) DEVICE — [TOMCAT CUTTER, ARTHROSCOPIC SHAVER BLADE,  DO NOT RESTERILIZE,  DO NOT USE IF PACKAGE IS DAMAGED,  KEEP DRY,  KEEP AWAY FROM SUNLIGHT]: Brand: FORMULA

## (undated) DEVICE — TBG ARTHRO FLOWSTEADY/ST DISP

## (undated) DEVICE — ARM SLING II: Brand: DEROYAL

## (undated) DEVICE — SYS CLS SKIN PREMIERPRO EXOFINFUSION 22CM

## (undated) DEVICE — PK SHLDR 30

## (undated) DEVICE — PAD,EYE,1-5/8X2 5/8,STERILE,LF,1/PK: Brand: MEDLINE

## (undated) DEVICE — CANN PASSPORT BUTN 8MM 5CM

## (undated) DEVICE — GLV SURG PREMIERPRO ORTHO LTX PF SZ8.5 BRN

## (undated) DEVICE — 4-PORT MANIFOLD: Brand: NEPTUNE 2

## (undated) DEVICE — BUR BRL FORMLA 6FLUT 4MM

## (undated) DEVICE — SUT MONOCRYL PLS ANTIB UND 3/0  PS1 27IN

## (undated) DEVICE — 1010 S-DRAPE TOWEL DRAPE 10/BX: Brand: STERI-DRAPE™

## (undated) DEVICE — 3M™ IOBAN™ 2 ANTIMICROBIAL INCISE DRAPE 6650EZ: Brand: IOBAN™ 2

## (undated) DEVICE — ANTIBACTERIAL UNDYED BRAIDED (POLYGLACTIN 910), SYNTHETIC ABSORBABLE SUTURE: Brand: COATED VICRYL

## (undated) DEVICE — PENCL ES MEGADINE EZ/CLEAN BUTN W/HOLSTR 10FT

## (undated) DEVICE — SHORT LENS-STERILE

## (undated) DEVICE — DRSNG BRDR MEPILEXLITE SLFADHR SIL 2X5

## (undated) DEVICE — CVR UNIV C/ARM

## (undated) DEVICE — MASK VENTILATOR MED AD SUPERNOVA ET

## (undated) DEVICE — 3M™ STERI-STRIP™ REINFORCED ADHESIVE SKIN CLOSURES, R1547, 1/2 IN X 4 IN (12 MM X 100 MM), 6 STRIPS/ENVELOPE: Brand: 3M™ STERI-STRIP™

## (undated) DEVICE — ENDO KIT,LOURDES HOSPITAL: Brand: MEDLINE INDUSTRIES, INC.

## (undated) DEVICE — SHOULDER STABILIZATION KIT,                                    DISPOSABLE 12 PER BOX

## (undated) DEVICE — PROB ABLAT SERFAS ENERGY 90S 3.5MM